# Patient Record
Sex: MALE | Race: WHITE | NOT HISPANIC OR LATINO | Employment: FULL TIME | URBAN - METROPOLITAN AREA
[De-identification: names, ages, dates, MRNs, and addresses within clinical notes are randomized per-mention and may not be internally consistent; named-entity substitution may affect disease eponyms.]

---

## 2018-11-09 ENCOUNTER — TRANSFERRED RECORDS (OUTPATIENT)
Dept: HEALTH INFORMATION MANAGEMENT | Facility: CLINIC | Age: 58
End: 2018-11-09

## 2018-11-21 ENCOUNTER — TRANSFERRED RECORDS (OUTPATIENT)
Dept: HEALTH INFORMATION MANAGEMENT | Facility: CLINIC | Age: 58
End: 2018-11-21

## 2018-12-05 ENCOUNTER — RECORDS - HEALTHEAST (OUTPATIENT)
Dept: ADMINISTRATIVE | Facility: OTHER | Age: 58
End: 2018-12-05

## 2019-01-18 ENCOUNTER — TRANSFERRED RECORDS (OUTPATIENT)
Dept: HEALTH INFORMATION MANAGEMENT | Facility: CLINIC | Age: 59
End: 2019-01-18

## 2019-01-29 ENCOUNTER — TRANSFERRED RECORDS (OUTPATIENT)
Dept: HEALTH INFORMATION MANAGEMENT | Facility: CLINIC | Age: 59
End: 2019-01-29

## 2019-02-08 ENCOUNTER — TRANSFERRED RECORDS (OUTPATIENT)
Dept: HEALTH INFORMATION MANAGEMENT | Facility: CLINIC | Age: 59
End: 2019-02-08

## 2019-02-12 ENCOUNTER — PRE VISIT (OUTPATIENT)
Dept: RADIATION ONCOLOGY | Facility: CLINIC | Age: 59
End: 2019-02-12

## 2019-02-12 NOTE — TELEPHONE ENCOUNTER
Date of appointment: TBD-Left message for patient to call back and schedule   Diagnosis/reason for appointment:Male breast cancer-left  Referring provider/facility:Dr. Jesus Ruelas-[facility]  Who called:Right fax referral    Recent Studies  Imaging:Mamogram will be done on 2/13/19,CT of chest and abdomen and nuc med bone scan  Pathology:11/21/18 Left simple mastectomy and SLNB  Labs:  Previous chemo/radiation (if known):?    Records requested from:Jana from [facility] is sending imaging disc and pathology [phone]    Records received from:[facility]    Additional information:

## 2019-02-13 ENCOUNTER — TELEPHONE (OUTPATIENT)
Dept: UROLOGY | Facility: CLINIC | Age: 59
End: 2019-02-13

## 2019-02-13 NOTE — TELEPHONE ENCOUNTER
ONCOLOGY INTAKE: Records Information      APPT INFORMATION: 03[place]25 [place]/ [place] [facility]  Referring provider:  Dr. Ruelas  Referring provider s clinic:   [facility]  Reason for visit/diagnosis:  Prostate Cancer     Were the records received with the referral (via [employer])? No    Has patient been seen for any external appt for this diagnosis (enter clinic/location)? Yes   Dx: Prostate Cancer: Caller pt: Ref by: Dr. Ruelas [facility]:  Bx [facility] [phone] Imaginging at [facility] & Records Records

## 2019-02-14 ENCOUNTER — TRANSFERRED RECORDS (OUTPATIENT)
Dept: HEALTH INFORMATION MANAGEMENT | Facility: CLINIC | Age: 59
End: 2019-02-14

## 2019-02-14 NOTE — TELEPHONE ENCOUNTER
Pathology slides are at [facility] Phone [phone]/Fax [phone]    [facility] is mailing disc of images along with the reports.

## 2019-02-15 PROCEDURE — 00000346 ZZHCL STATISTIC REVIEW OUTSIDE SLIDES TC 88321: Performed by: RADIOLOGY

## 2019-02-26 ENCOUNTER — DOCUMENTATION ONLY (OUTPATIENT)
Dept: CARE COORDINATION | Facility: CLINIC | Age: 59
End: 2019-02-26

## 2019-03-04 DIAGNOSIS — K70.9 ALCOHOLIC LIVER DAMAGE (H): Primary | ICD-10-CM

## 2019-04-11 ENCOUNTER — TELEPHONE (OUTPATIENT)
Dept: GASTROENTEROLOGY | Facility: CLINIC | Age: 59
End: 2019-04-11

## 2019-04-11 ENCOUNTER — OFFICE VISIT (OUTPATIENT)
Dept: RADIATION ONCOLOGY | Facility: CLINIC | Age: 59
End: 2019-04-11
Attending: RADIOLOGY
Payer: COMMERCIAL

## 2019-04-11 VITALS
OXYGEN SATURATION: 97 % | WEIGHT: 168.5 LBS | SYSTOLIC BLOOD PRESSURE: 142 MMHG | DIASTOLIC BLOOD PRESSURE: 85 MMHG | HEART RATE: 86 BPM

## 2019-04-11 DIAGNOSIS — C50.122 MALIGNANT NEOPLASM OF CENTRAL PORTION OF LEFT BREAST IN MALE, ESTROGEN RECEPTOR POSITIVE (H): Primary | ICD-10-CM

## 2019-04-11 DIAGNOSIS — Z17.0 MALIGNANT NEOPLASM OF CENTRAL PORTION OF LEFT BREAST IN MALE, ESTROGEN RECEPTOR POSITIVE (H): Primary | ICD-10-CM

## 2019-04-11 LAB — COPATH REPORT: NORMAL

## 2019-04-11 PROCEDURE — G0463 HOSPITAL OUTPT CLINIC VISIT: HCPCS | Performed by: RADIOLOGY

## 2019-04-11 RX ORDER — MULTIVITAMIN,THERAPEUTIC
1 TABLET ORAL DAILY
COMMUNITY

## 2019-04-11 RX ORDER — TAMOXIFEN CITRATE 20 MG/1
TABLET ORAL
Refills: 1 | COMMUNITY
Start: 2019-03-13

## 2019-04-11 ASSESSMENT — ENCOUNTER SYMPTOMS
INSOMNIA: 0
FEVER: 0
CHILLS: 0
HEADACHES: 0
DYSURIA: 0
DOUBLE VISION: 0
NERVOUS/ANXIOUS: 0
VOMITING: 0
CONSTIPATION: 0
FREQUENCY: 0
EYE PAIN: 0
DEPRESSION: 0
SORE THROAT: 0
HEMATURIA: 0
BLOOD IN STOOL: 0
WEIGHT LOSS: 0
BACK PAIN: 0
SHORTNESS OF BREATH: 0
DIAPHORESIS: 0
BRUISES/BLEEDS EASILY: 0
TINGLING: 0
NECK PAIN: 0
DIARRHEA: 0
NAUSEA: 0
SEIZURES: 0
BLURRED VISION: 0
FALLS: 0
DIZZINESS: 0

## 2019-04-11 NOTE — LETTER
2019       RE: Gilles Malloy  [address]  [facility] [address]     Dear Colleague,    Thank you for referring your patient, Gilles Malloy, to the RADIATION ONCOLOGY CLINIC. Please see a copy of my visit note below.    Department of Radiation Oncology                   [place] [address]  [address] [place]  [address]  Office:  [phone]  Fax:  [phone]   Radiation Oncology Clinic  [address] [place] [address]  Phone:  [phone]  Fax:  [phone]     RE: Gilles Malloy : 1960   MRN: [id number] ÁNGEL: 2019     OUTPATIENT VISIT NOTE       PROBLEM: pT2 N0 M0 ER/KS+, HER-2- invasive ductal carcinoma of the LEFT breast     was seen for initial consultation in the Dept of Therapeutic Radiology on 2019 at the request of Dr. Ruelas    HISTORY OF PRESENT ILLNESS:   Mr. Malloy is a 58 year old male with a pT2 N0 M0 ER/KS+ HER2- IDC of the left breast.  He has significant history of alcohol abuse.  About  or , he was hospitalized for hepatic encephalopathy.  He also required paracentesis about once every 6 weeks.  However, he had since cut back from 1 bottle of Vodka a day to 6 beers a day, and has not required paracentesis in the past few years.  He did not have a history of variceal bleeding.    He initially noticed a lump in the subareolar area of the left breast around May or .  He observed this area for a few months during which time it remained relatively stable.  He did however bring it to the attention of his PCP who performed a biopsy. The patient's description is consistent with an incisional biopsy. Biopsy on 2018 ([id number]) showed grade 2 invasive ductal carcinoma.  Tumor was ER positive (80%, 2+), KS positive (80%, 2-3+), and HER-2 negative (1+) by IHC.    He underwent left simple mastectomy by Dr. Olsen on 2018.  It does not appear that he had any pre-op imaging. Surgical  "pathology ([id number]) showed invasive ductal carcinoma measuring 3.2 cm in greatest dimension.  His tumor was Lee[place] grade 2 and margins were uninvolved (3 mm from the nearest inferior/anterior margin).  DCIS without EIC was present and involving approximately 5% of the tumor and 9/13 blocks.  DCIS was nuclear grade 2 and solid, cribriform, and micropapillary types with focal comedonecrosis.  DCIS margins were also uninvolved (3 mm from the nearest inferior/anterior margin.  There were a total of 4 lymph nodes removed (non-sentinel), none of which contained metastatic carcinoma.  His final stage was therefore pT2pN0.     Following surgery, it appears that he was not referred to medical oncology for some time.  Eventually he was referred by his PCP and saw Dr. Ruelas on 1/18/2019.  Oncotype DX was sent at this time and returned low risk at 16 correlating with a 4% risk of distant recurrence at 9 years and an absolute chemotherapy benefit of less than 1%.  The patient also underwent staging with CT chest abdomen pelvis and a bone scan at this time.  CT and bone scan showed no evidence of metastatic disease, but there was evidence of a retroareolar soft tissue density on the right. He was also referred for genetic testing, and was found not to harbor mutations in BRCA1, BRCA2, CDH1, PALB2, PTEN, STK11, TP53. Per Dr. Ruelas, there appears to be some residual breast tissue on the left but it is unclear how this was assessed. Given the soft tissue density on the right, he referred the patient for mammogram and breast US. Adjuvant endocrine therapy was discussed with the plan to begin tamoxifen following consultation in radiation oncology, for which he was referred to our department.    Mammogram and ultrasound were performed on 2/13/2019.  The right breast demonstrated moderate gynecomastia.  The left breast demonstrated an area of scar tissue at the \"lumpectomy site\".  There was no suspicious mass or " adenopathy.  Bilateral breast ultrasound showed features typical of gynecomastia behind the nipple on the right.  Scanning of the left showed scar tissue subjacent to the skin incision.  There is no suspicious mass or adenopathy.  Final assessment was BI-RADS 2.    On exam today, Mr. Malloy reports that he is overall doing well.  He reports that he recovered well from his surgery and has no residual pain at this time.  He has furthermore not noticed any swelling in the left arm.  He does not have any sensory changes.  He reports that he has been on tamoxifen for 1-2 months and has not noticed any side effects from this.  He specifically denies hot flashes.  He has not had any recent fevers, chills, nausea, vomiting, headaches.  The remainder of his review of systems is unremarkable.      PAST MEDICAL HISTORY:   Alcohol abuse disorder  Alcoholic liver disease  Pancreatitis    Past Surgical History:   Procedure Laterality Date     CHOLECYSTECTOMY       HERNIA REPAIR      x 2     MANDIBLE SURGERY      Dental jaw surgery 2008     MASTECTOMY Left 11/21/2018     RECONSTRUCT NOSE AND SEPTUM (FUNCTIONAL)         CHEMOTHERAPY HISTORY: None     PAST RADIATION THERAPY HISTORY: None     MEDICATIONS:   Current Outpatient Medications   Medication Sig Dispense Refill     multivitamin, therapeutic (THERA-VIT) TABS tablet Take 1 tablet by mouth daily       tamoxifen (NOLVADEX) 20 MG tablet TK 1 T PO DAILY  1       ALLERGIES:  allergic to penicillins and sulfa drugs.    SOCIAL HISTORY: Single. Lives alone in [place]. Current every day smoker with approximately 35 pack years. Currently smokes 0.75 pack per day.  History of heavy EtOH use, ~1L spirits per day. Now drinks ~4-6 beers per day. No other recreational drug use.    FAMILY HISTORY:   family history includes Aplastic anemia in his sister; Prostate Cancer in his father.    REVIEW OF SYMPTOMS:  A full 14-point review of systems was performed.     PHYSICAL  EXAMINATION:    /85   Pulse 86   Wt 76.4 kg (168 lb 8 oz)   SpO2 97%   General: Alert, oriented, NAD  Skin: No rashes or lesions appreciated  HEENT: NCAT, EOMI, PERRL  Neck: Supple, full ROM, no cervical LAD  Breasts: Right gynecomastia. There is firm subareolar tissue just inferior to the nipple on the right. No masses palpated on the right. Left chest demonstrates well-healed mastectomy scar. No palpable masses on left. Some residual fatty tissue medial to the scar. Axillae negative bilaterally.  Heart: WWP  Lungs: Breathing comfortably on RA  Abd: Nondistended, soft, nontender  /Rectal: Deferred  Ext: Atraumatic, grossly intact strength  Neuro: CNs grossly intact, normal gait    ECOG PS: 1     ASSESSMENT: 58 year old male with [id number] ER/ME+ HER-2- IDC of the left breast s/p left mastectomy.    RECOMMENDATIONS:   We had a detailed discussion with Mr. Malloy regarding the role of radiation in male breast cancer.  We discussed that male breast cancer is uncommon and therefore there is a lack of data regarding the optimal treatment strategies in this patient population.  Currently, we treat male breast cancer similar to the way we treat female breast cancers.  We discussed that given his early stage disease and surgery which consisted of a mastectomy, we do not have a clear indication for adjuvant radiotherapy.  We did discuss that there are, however, a number of additional questions we have regarding his treatment to this point.  There is some question about whether the lymph nodes that were removed at the time of surgery or sentinel lymph nodes versus random sampling.  However, this is unlikely to change our current treatment recommendation.  We also discussed that there is some question regarding whether tamoxifen is the ideal endocrine therapy for him given his history of liver disease.  We discussed that we will discuss his case tomorrow at breast tumor board to address this question with our  medical oncologists.  He also has an appointment coming up with Dr. Larson of hepatology on Monday.  Finally, given gynecomastia, it is unclear if he would require surveillance breast imaging.  We explained the above plan to the patient who agrees with this course of action.  We will attempt to obtain the remainder of his outside records and will call him if any additional information influences the above recommendations.    Thank you for allowing us to participate in this patient's care.  Please feel free to call with any questions or concerns.    The patient was seen and discussed with staff, Dr. Schafer.     Matias Goodrich MD  Resident, PGY-4  Department of Radiation Oncology  [facility]  [phone]         I saw and examined the patient with the resident.  I have reviewed and edited the resident's note and agree with the plan of care.    Case discussed at the tumor boards.  1) It was felt that gynecomastia in and of itself is not associated with increased risk of breast cancer. The best surveillance strategy is physical examination.  Mammogram was not felt to be necessary.  2) It is unclear if his liver function would interfere with metabolism of Tamoxifen.  However, given lack of data, it was felt that tamoxifen is appropriate endocrine therapy for this gentleman.      Matty Schafer MD          HPI  INITIAL PATIENT ASSESSMENT    Diagnosis: Male breast cancer, Lt mastectomy 11/21/19    Prior radiation therapy: None    Prior chemotherapy: None    Prior hormonal therapy:Yes: started Tamoxifen last months    Pain Eval:  Denies    Psychosocial  Living arrangements: self/ roommates in a house  Fall Risk: independent   referral needs: Not needed    Advanced Directive: No  Implantable Cardiac Device? No    Review of Systems   Constitutional: Negative for chills, diaphoresis, fever, malaise/fatigue and weight loss.   HENT: Negative for ear pain, hearing loss, nosebleeds and sore throat.     Eyes: Negative for blurred vision, double vision and pain.   Respiratory: Negative for shortness of breath.    Cardiovascular: Negative for chest pain and leg swelling.   Gastrointestinal: Negative for blood in stool, constipation, diarrhea, nausea and vomiting.   Genitourinary: Negative for dysuria, frequency, hematuria and urgency.   Musculoskeletal: Negative for back pain, falls, joint pain and neck pain.   Skin: Negative for rash.   Neurological: Negative for dizziness, tingling, seizures and headaches.   Endo/Heme/Allergies: Does not bruise/bleed easily.   Psychiatric/Behavioral: Negative for depression. The patient is not nervous/anxious and does not have insomnia.            Nurse face-to-face time: Level 4:  15 min face to face time      Again, thank you for allowing me to participate in the care of your patient.      Sincerely,    Matty Schafer MD

## 2019-04-11 NOTE — TELEPHONE ENCOUNTER
Left message for pt reminding them of upcoming appointment.  Instructed pt to bring updated medications list.  Virginia Rudd MA

## 2019-04-11 NOTE — PROGRESS NOTES
HPI  INITIAL PATIENT ASSESSMENT    Diagnosis: Male breast cancer, Lt mastectomy 11/21/19    Prior radiation therapy: None    Prior chemotherapy: None    Prior hormonal therapy:Yes: started Tamoxifen last months    Pain Eval:  Denies    Psychosocial  Living arrangements: self/ roommates in a house  Fall Risk: independent   referral needs: Not needed    Advanced Directive: No  Implantable Cardiac Device? No    Review of Systems   Constitutional: Negative for chills, diaphoresis, fever, malaise/fatigue and weight loss.   HENT: Negative for ear pain, hearing loss, nosebleeds and sore throat.    Eyes: Negative for blurred vision, double vision and pain.   Respiratory: Negative for shortness of breath.    Cardiovascular: Negative for chest pain and leg swelling.   Gastrointestinal: Negative for blood in stool, constipation, diarrhea, nausea and vomiting.   Genitourinary: Negative for dysuria, frequency, hematuria and urgency.   Musculoskeletal: Negative for back pain, falls, joint pain and neck pain.   Skin: Negative for rash.   Neurological: Negative for dizziness, tingling, seizures and headaches.   Endo/Heme/Allergies: Does not bruise/bleed easily.   Psychiatric/Behavioral: Negative for depression. The patient is not nervous/anxious and does not have insomnia.            Nurse face-to-face time: Level 4:  15 min face to face time

## 2019-04-11 NOTE — PROGRESS NOTES
Department of Radiation Oncology                   [place] [address]  [address] [place]  [address]  Office:  [phone]  Fax:  [phone]   Radiation Oncology Clinic  [address] [place] [address]  Phone:  [phone]  Fax:  [phone]     RE: Gilles Malloy : 1960   MRN: [id number] ÁNGEL: 2019     OUTPATIENT VISIT NOTE       PROBLEM: pT2 N0 M0 ER/HI+, HER-2- invasive ductal carcinoma of the LEFT breast     was seen for initial consultation in the Dept of Therapeutic Radiology on 2019 at the request of Dr. Ruelas    HISTORY OF PRESENT ILLNESS:   Mr. Malloy is a 58 year old male with a pT2 N0 M0 ER/HI+ HER2- IDC of the left breast.  He has significant history of alcohol abuse.  About  or , he was hospitalized for hepatic encephalopathy.  He also required paracentesis about once every 6 weeks.  However, he had since cut back from 1 bottle of Vodka a day to 6 beers a day, and has not required paracentesis in the past few years.  He did not have a history of variceal bleeding.    He initially noticed a lump in the subareolar area of the left breast around May or .  He observed this area for a few months during which time it remained relatively stable.  He did however bring it to the attention of his PCP who performed a biopsy. The patient's description is consistent with an incisional biopsy. Biopsy on 2018 ([id number]) showed grade 2 invasive ductal carcinoma.  Tumor was ER positive (80%, 2+), HI positive (80%, 2-3+), and HER-2 negative (1+) by IHC.    He underwent left simple mastectomy by Dr. Olsen on 2018.  It does not appear that he had any pre-op imaging. Surgical pathology ([id number]) showed invasive ductal carcinoma measuring 3.2 cm in greatest dimension.  His tumor was [place] grade 2 and margins were uninvolved (3 mm from the nearest inferior/anterior margin).  DCIS without EIC was present and  "involving approximately 5% of the tumor and 9/13 blocks.  DCIS was nuclear grade 2 and solid, cribriform, and micropapillary types with focal comedonecrosis.  DCIS margins were also uninvolved (3 mm from the nearest inferior/anterior margin.  There were a total of 4 lymph nodes removed (non-sentinel), none of which contained metastatic carcinoma.  His final stage was therefore pT2pN0.     Following surgery, it appears that he was not referred to medical oncology for some time.  Eventually he was referred by his PCP and saw Dr. Ruelas on 1/18/2019.  Oncotype DX was sent at this time and returned low risk at 16 correlating with a 4% risk of distant recurrence at 9 years and an absolute chemotherapy benefit of less than 1%.  The patient also underwent staging with CT chest abdomen pelvis and a bone scan at this time.  CT and bone scan showed no evidence of metastatic disease, but there was evidence of a retroareolar soft tissue density on the right. He was also referred for genetic testing, and was found not to harbor mutations in BRCA1, BRCA2, CDH1, PALB2, PTEN, STK11, TP53. Per Dr. Ruelas, there appears to be some residual breast tissue on the left but it is unclear how this was assessed. Given the soft tissue density on the right, he referred the patient for mammogram and breast US. Adjuvant endocrine therapy was discussed with the plan to begin tamoxifen following consultation in radiation oncology, for which he was referred to our department.    Mammogram and ultrasound were performed on 2/13/2019.  The right breast demonstrated moderate gynecomastia.  The left breast demonstrated an area of scar tissue at the \"lumpectomy site\".  There was no suspicious mass or adenopathy.  Bilateral breast ultrasound showed features typical of gynecomastia behind the nipple on the right.  Scanning of the left showed scar tissue subjacent to the skin incision.  There is no suspicious mass or adenopathy.  Final assessment was " BI-RADS 2.    On exam today, Mr. Malloy reports that he is overall doing well.  He reports that he recovered well from his surgery and has no residual pain at this time.  He has furthermore not noticed any swelling in the left arm.  He does not have any sensory changes.  He reports that he has been on tamoxifen for 1-2 months and has not noticed any side effects from this.  He specifically denies hot flashes.  He has not had any recent fevers, chills, nausea, vomiting, headaches.  The remainder of his review of systems is unremarkable.      PAST MEDICAL HISTORY:   Alcohol abuse disorder  Alcoholic liver disease  Pancreatitis    Past Surgical History:   Procedure Laterality Date     CHOLECYSTECTOMY       HERNIA REPAIR      x 2     MANDIBLE SURGERY      Dental jaw surgery 2008     MASTECTOMY Left 11/21/2018     RECONSTRUCT NOSE AND SEPTUM (FUNCTIONAL)         CHEMOTHERAPY HISTORY: None     PAST RADIATION THERAPY HISTORY: None     MEDICATIONS:   Current Outpatient Medications   Medication Sig Dispense Refill     multivitamin, therapeutic (THERA-VIT) TABS tablet Take 1 tablet by mouth daily       tamoxifen (NOLVADEX) 20 MG tablet TK 1 T PO DAILY  1       ALLERGIES:  allergic to penicillins and sulfa drugs.    SOCIAL HISTORY: Single. Lives alone in [place]. Current every day smoker with approximately 35 pack years. Currently smokes 0.75 pack per day.  History of heavy EtOH use, ~1L spirits per day. Now drinks ~4-6 beers per day. No other recreational drug use.    FAMILY HISTORY:   family history includes Aplastic anemia in his sister; Prostate Cancer in his father.    REVIEW OF SYMPTOMS:  A full 14-point review of systems was performed.     PHYSICAL EXAMINATION:    /85   Pulse 86   Wt 76.4 kg (168 lb 8 oz)   SpO2 97%   General: Alert, oriented, NAD  Skin: No rashes or lesions appreciated  HEENT: NCAT, EOMI, PERRL  Neck: Supple, full ROM, no cervical LAD  Breasts: Right gynecomastia. There  is firm subareolar tissue just inferior to the nipple on the right. No masses palpated on the right. Left chest demonstrates well-healed mastectomy scar. No palpable masses on left. Some residual fatty tissue medial to the scar. Axillae negative bilaterally.  Heart: WWP  Lungs: Breathing comfortably on RA  Abd: Nondistended, soft, nontender  /Rectal: Deferred  Ext: Atraumatic, grossly intact strength  Neuro: CNs grossly intact, normal gait    ECOG PS: 1     ASSESSMENT: 58 year old male with [id number] ER/NH+ HER-2- IDC of the left breast s/p left mastectomy.    RECOMMENDATIONS:   We had a detailed discussion with Mr. Malloy regarding the role of radiation in male breast cancer.  We discussed that male breast cancer is uncommon and therefore there is a lack of data regarding the optimal treatment strategies in this patient population.  Currently, we treat male breast cancer similar to the way we treat female breast cancers.  We discussed that given his early stage disease and surgery which consisted of a mastectomy, we do not have a clear indication for adjuvant radiotherapy.  We did discuss that there are, however, a number of additional questions we have regarding his treatment to this point.  There is some question about whether the lymph nodes that were removed at the time of surgery or sentinel lymph nodes versus random sampling.  However, this is unlikely to change our current treatment recommendation.  We also discussed that there is some question regarding whether tamoxifen is the ideal endocrine therapy for him given his history of liver disease.  We discussed that we will discuss his case tomorrow at breast tumor board to address this question with our medical oncologists.  He also has an appointment coming up with Dr. Larson of hepatology on Monday.  Finally, given gynecomastia, it is unclear if he would require surveillance breast imaging.  We explained the above plan to the patient who agrees with this  course of action.  We will attempt to obtain the remainder of his outside records and will call him if any additional information influences the above recommendations.    Thank you for allowing us to participate in this patient's care.  Please feel free to call with any questions or concerns.    The patient was seen and discussed with staff, Dr. Schafer.     Matias Goodrich MD  Resident, PGY-4  Department of Radiation Oncology  [facility]  [phone]         I saw and examined the patient with the resident.  I have reviewed and edited the resident's note and agree with the plan of care.    Case discussed at the tumor boards.  1) It was felt that gynecomastia in and of itself is not associated with increased risk of breast cancer. The best surveillance strategy is physical examination.  Mammogram was not felt to be necessary.  2) It is unclear if his liver function would interfere with metabolism of Tamoxifen.  However, given lack of data, it was felt that tamoxifen is appropriate endocrine therapy for this gentleman.      Matty Schafer MD

## 2019-04-12 ENCOUNTER — TELEPHONE (OUTPATIENT)
Dept: RADIATION ONCOLOGY | Facility: CLINIC | Age: 59
End: 2019-04-12

## 2019-04-15 ENCOUNTER — OFFICE VISIT (OUTPATIENT)
Dept: GASTROENTEROLOGY | Facility: CLINIC | Age: 59
End: 2019-04-15
Attending: INTERNAL MEDICINE
Payer: COMMERCIAL

## 2019-04-15 VITALS
TEMPERATURE: 97.9 F | DIASTOLIC BLOOD PRESSURE: 92 MMHG | HEIGHT: 69 IN | OXYGEN SATURATION: 95 % | BODY MASS INDEX: 24.62 KG/M2 | WEIGHT: 166.2 LBS | HEART RATE: 85 BPM | SYSTOLIC BLOOD PRESSURE: 156 MMHG

## 2019-04-15 DIAGNOSIS — K70.30 ALCOHOLIC CIRRHOSIS OF LIVER WITHOUT ASCITES (H): Primary | ICD-10-CM

## 2019-04-15 DIAGNOSIS — K70.9 ALCOHOLIC LIVER DAMAGE (H): ICD-10-CM

## 2019-04-15 LAB
ALBUMIN SERPL-MCNC: 3.3 G/DL (ref 3.4–5)
ALP SERPL-CCNC: 78 U/L (ref 40–150)
ALT SERPL W P-5'-P-CCNC: 41 U/L (ref 0–70)
ANION GAP SERPL CALCULATED.3IONS-SCNC: 9 MMOL/L (ref 3–14)
AST SERPL W P-5'-P-CCNC: 55 U/L (ref 0–45)
BILIRUB DIRECT SERPL-MCNC: 0.6 MG/DL (ref 0–0.2)
BILIRUB SERPL-MCNC: 0.9 MG/DL (ref 0.2–1.3)
BUN SERPL-MCNC: 8 MG/DL (ref 7–30)
CALCIUM SERPL-MCNC: 9.6 MG/DL (ref 8.5–10.1)
CHLORIDE SERPL-SCNC: 109 MMOL/L (ref 94–109)
CO2 SERPL-SCNC: 22 MMOL/L (ref 20–32)
CREAT SERPL-MCNC: 0.81 MG/DL (ref 0.66–1.25)
ERYTHROCYTE [DISTWIDTH] IN BLOOD BY AUTOMATED COUNT: 12.8 % (ref 10–15)
GFR SERPL CREATININE-BSD FRML MDRD: >90 ML/MIN/{1.73_M2}
GLUCOSE SERPL-MCNC: 121 MG/DL (ref 70–99)
HCT VFR BLD AUTO: 45.1 % (ref 40–53)
HGB BLD-MCNC: 15.5 G/DL (ref 13.3–17.7)
INR PPP: 1.18 (ref 0.86–1.14)
MCH RBC QN AUTO: 35 PG (ref 26.5–33)
MCHC RBC AUTO-ENTMCNC: 34.4 G/DL (ref 31.5–36.5)
MCV RBC AUTO: 102 FL (ref 78–100)
PLATELET # BLD AUTO: 116 10E9/L (ref 150–450)
POTASSIUM SERPL-SCNC: 3.5 MMOL/L (ref 3.4–5.3)
PROT SERPL-MCNC: 8.2 G/DL (ref 6.8–8.8)
RBC # BLD AUTO: 4.43 10E12/L (ref 4.4–5.9)
SODIUM SERPL-SCNC: 139 MMOL/L (ref 133–144)
WBC # BLD AUTO: 6.2 10E9/L (ref 4–11)

## 2019-04-15 PROCEDURE — G0463 HOSPITAL OUTPT CLINIC VISIT: HCPCS | Mod: 25,ZF

## 2019-04-15 PROCEDURE — 80076 HEPATIC FUNCTION PANEL: CPT | Performed by: INTERNAL MEDICINE

## 2019-04-15 PROCEDURE — 85027 COMPLETE CBC AUTOMATED: CPT | Performed by: INTERNAL MEDICINE

## 2019-04-15 PROCEDURE — 36415 COLL VENOUS BLD VENIPUNCTURE: CPT | Performed by: INTERNAL MEDICINE

## 2019-04-15 PROCEDURE — 80048 BASIC METABOLIC PNL TOTAL CA: CPT | Performed by: INTERNAL MEDICINE

## 2019-04-15 PROCEDURE — 90732 PPSV23 VACC 2 YRS+ SUBQ/IM: CPT | Mod: ZF | Performed by: INTERNAL MEDICINE

## 2019-04-15 PROCEDURE — G0009 ADMIN PNEUMOCOCCAL VACCINE: HCPCS | Mod: ZF

## 2019-04-15 PROCEDURE — 25000128 H RX IP 250 OP 636: Mod: ZF | Performed by: INTERNAL MEDICINE

## 2019-04-15 PROCEDURE — 85610 PROTHROMBIN TIME: CPT | Performed by: INTERNAL MEDICINE

## 2019-04-15 RX ADMIN — PNEUMOCOCCAL VACCINE POLYVALENT 0.5 ML
25; 25; 25; 25; 25; 25; 25; 25; 25; 25; 25; 25; 25; 25; 25; 25; 25; 25; 25; 25; 25; 25; 25 INJECTION, SOLUTION INTRAMUSCULAR; SUBCUTANEOUS at 16:42

## 2019-04-15 ASSESSMENT — PAIN SCALES - GENERAL: PAINLEVEL: NO PAIN (0)

## 2019-04-15 ASSESSMENT — MIFFLIN-ST. JEOR: SCORE: 1554.26

## 2019-04-15 NOTE — NURSING NOTE
"Chief Complaint   Patient presents with     Consult     Alcoholic liver damage      BP (!) 156/92   Pulse 85   Temp 97.9  F (36.6  C) (Oral)   Ht 1.753 m (5' 9\")   Wt 75.4 kg (166 lb 3.2 oz)   SpO2 95%   BMI 24.54 kg/m    Virginia Rudd MA    "

## 2019-04-15 NOTE — LETTER
4/15/2019       RE: Gilles Malloy  [address]  [facility] [address]     Dear Colleague,    Thank you for referring your patient, Gilles Malloy, to the [facility] HEPATOLOGY at [facility]. Please see a copy of my visit note below.    I had the pleasure of seeing Gilles Malloy for consultation in the Liver Clinic at the [facility] on 04/15/2019.  Mr. Malloy presents for consultation regarding probable diagnosis of alcoholic cirrhosis.      A significant recent history dates back several months ago when his primary physician felt a hard lump in his breast.  It was subsequently biopsied and shown to be breast cancer.  He has subsequently had a mastectomy and is currently taking tamoxifen.  It is interesting that he was on spironolactone for management of ascites previously but did not take it for quite some time.      In terms of his liver disease, several years ago he did develop the onset of ascites.  He was drinking about a quart of hard liquor per day and cut back to drinking only beer of 4-6 per day and with that it appears as though his liver function did improve somewhat and eventually he was able to stop the large volume paracentesis.  He never did have a liver biopsy and he believes he has had an upper endoscopy but did not know the results of that.      At present, he feels really quite well.  He denies any abdominal pain, itching or skin rash or fatigue.  He denies any increased abdominal girth or lower extremity edema.  He denies any gastrointestinal bleeding or any overt signs of hepatic encephalopathy.  He denies any fevers or chills, cough or shortness of breath.  He denies any nausea, vomiting, diarrhea or constipation.  His appetite has been good, and his weight has been stable.      Unfortunately, he continues to drink probably at least 4 beers every night.      PAST MEDICAL HISTORY:  Significant for previous  "cholecystectomy.  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  He has had 2 umbilical hernias repaired as well as a previous appendectomy.  He has no other medical problems.      SOCIAL HISTORY:  He works as a .  He is a smoker.  His alcohol history is as noted above.      FAMILY HISTORY:  He has an uncle who has liver disease from alcohol.      REVIEW OF SYSTEMS:  A complete review of systems was negative other than that noted above.     Current Outpatient Medications   Medication     multivitamin, therapeutic (THERA-VIT) TABS tablet     tamoxifen (NOLVADEX) 20 MG tablet     No current facility-administered medications for this visit.      BP (!) 156/92   Pulse 85   Temp 97.9  F (36.6  C) (Oral)   Ht 1.753 m (5' 9\")   Wt 75.4 kg (166 lb 3.2 oz)   SpO2 95%   BMI 24.54 kg/m       In general he looks reasonably well.  HEENT exam shows no scleral icterus or temporal muscle wasting.  He has very poor dentition.  His neck is without thyromegaly.  His chest is clear.  His cardiac exam reveals no S3, S4 or murmurs.  His abdominal exam shows no obvious ascites.  No masses or tenderness to palpation are present.  His liver is 10 cm in span without left lobe enlargement.  No spleen tip is palpable.  Extremity exam shows no edema.  Skin exam shows some palmar erythema with a few spider angioma.  Neurologic exam shows no asterixis.     Recent Results (from the past 168 hour(s))   CBC with platelets    Collection Time: 04/15/19  3:11 PM   Result Value Ref Range    WBC 6.2 4.0 - 11.0 10e9/L    RBC Count 4.43 4.4 - 5.9 10e12/L    Hemoglobin 15.5 13.3 - 17.7 g/dL    Hematocrit 45.1 40.0 - 53.0 %     (H) 78 - 100 fl    MCH 35.0 (H) 26.5 - 33.0 pg    MCHC 34.4 31.5 - 36.5 g/dL    RDW 12.8 10.0 - 15.0 %    Platelet Count 116 (L) 150 - 450 10e9/L   Basic metabolic panel    Collection Time: 04/15/19  3:11 PM   Result Value Ref Range    Sodium 139 133 - 144 mmol/L    " Potassium 3.5 3.4 - 5.3 mmol/L    Chloride 109 94 - 109 mmol/L    Carbon Dioxide 22 20 - 32 mmol/L    Anion Gap 9 3 - 14 mmol/L    Glucose 121 (H) 70 - 99 mg/dL    Urea Nitrogen 8 7 - 30 mg/dL    Creatinine 0.81 0.66 - 1.25 mg/dL    GFR Estimate >90 >60 mL/min/[1.73_m2]    GFR Estimate If Black >90 >60 mL/min/[1.73_m2]    Calcium 9.6 8.5 - 10.1 mg/dL   Hepatic panel    Collection Time: 04/15/19  3:11 PM   Result Value Ref Range    Bilirubin Direct 0.6 (H) 0.0 - 0.2 mg/dL    Bilirubin Total 0.9 0.2 - 1.3 mg/dL    Albumin 3.3 (L) 3.4 - 5.0 g/dL    Protein Total 8.2 6.8 - 8.8 g/dL    Alkaline Phosphatase 78 40 - 150 U/L    ALT 41 0 - 70 U/L    AST 55 (H) 0 - 45 U/L   INR    Collection Time: 04/15/19  3:11 PM   Result Value Ref Range    INR 1.18 (H) 0.86 - 1.14      I did review his recent CT scan that shows some perigastric and perisplenic varices.  No ascites was seen.  His liver did appear to be fatty.      IMPRESSION:  My impression is that Mr. Malloy has alcoholic cirrhosis without ascites.  He does need to stop all alcohol completely if he does have fatty liver disease and elevated ALT and elevated MCV.  I did advise him that he may want to look into alcohol treatment as well.  He does need to be tested for hepatitis C as he does not think he has been tested for many years for that.      In terms of fully evaluating his cirrhosis, he does need an upper GI endoscopy to screen for esophageal varices.  He is up-to-date with regard to variceal screening.  I will give him a Pneumovax today and he will get a Prevnar 13 again in 1 year.  Otherwise, my plan will be to see him back in the clinic again in 6 months.      Thank you very much for allowing me to participate in the care of your patient.  If you have any questions regarding my recommendations, please do not hesitate to contact me.       Zach Larson MD      Professor of Medicine  [employer]      Executive Medical Director, Solid  Organ Transplant Program  [facility]

## 2019-04-15 NOTE — LETTER
4/15/2019      RE: Gilles Malloy  [address]  [facility] [address]       I had the pleasure of seeing Gilles Malloy for consultation in the Liver Clinic at the [facility] on 04/15/2019.  Mr. Malloy presents for consultation regarding probable diagnosis of alcoholic cirrhosis.      A significant recent history dates back several months ago when his primary physician felt a hard lump in his breast.  It was subsequently biopsied and shown to be breast cancer.  He has subsequently had a mastectomy and is currently taking tamoxifen.  It is interesting that he was on spironolactone for management of ascites previously but did not take it for quite some time.      In terms of his liver disease, several years ago he did develop the onset of ascites.  He was drinking about a quart of hard liquor per day and cut back to drinking only beer of 4-6 per day and with that it appears as though his liver function did improve somewhat and eventually he was able to stop the large volume paracentesis.  He never did have a liver biopsy and he believes he has had an upper endoscopy but did not know the results of that.      At present, he feels really quite well.  He denies any abdominal pain, itching or skin rash or fatigue.  He denies any increased abdominal girth or lower extremity edema.  He denies any gastrointestinal bleeding or any overt signs of hepatic encephalopathy.  He denies any fevers or chills, cough or shortness of breath.  He denies any nausea, vomiting, diarrhea or constipation.  His appetite has been good, and his weight has been stable.      Unfortunately, he continues to drink probably at least 4 beers every night.      PAST MEDICAL HISTORY:  Significant for previous cholecystectomy.  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  He has had 2 umbilical hernias repaired as well as a previous appendectomy.  He has no other medical problems.     "  SOCIAL HISTORY:  He works as a .  He is a smoker.  His alcohol history is as noted above.      FAMILY HISTORY:  He has an uncle who has liver disease from alcohol.      REVIEW OF SYSTEMS:  A complete review of systems was negative other than that noted above.     Current Outpatient Medications   Medication     multivitamin, therapeutic (THERA-VIT) TABS tablet     tamoxifen (NOLVADEX) 20 MG tablet     No current facility-administered medications for this visit.      BP (!) 156/92   Pulse 85   Temp 97.9  F (36.6  C) (Oral)   Ht 1.753 m (5' 9\")   Wt 75.4 kg (166 lb 3.2 oz)   SpO2 95%   BMI 24.54 kg/m       In general he looks reasonably well.  HEENT exam shows no scleral icterus or temporal muscle wasting.  He has very poor dentition.  His neck is without thyromegaly.  His chest is clear.  His cardiac exam reveals no S3, S4 or murmurs.  His abdominal exam shows no obvious ascites.  No masses or tenderness to palpation are present.  His liver is 10 cm in span without left lobe enlargement.  No spleen tip is palpable.  Extremity exam shows no edema.  Skin exam shows some palmar erythema with a few spider angioma.  Neurologic exam shows no asterixis.     Recent Results (from the past 168 hour(s))   CBC with platelets    Collection Time: 04/15/19  3:11 PM   Result Value Ref Range    WBC 6.2 4.0 - 11.0 10e9/L    RBC Count 4.43 4.4 - 5.9 10e12/L    Hemoglobin 15.5 13.3 - 17.7 g/dL    Hematocrit 45.1 40.0 - 53.0 %     (H) 78 - 100 fl    MCH 35.0 (H) 26.5 - 33.0 pg    MCHC 34.4 31.5 - 36.5 g/dL    RDW 12.8 10.0 - 15.0 %    Platelet Count 116 (L) 150 - 450 10e9/L   Basic metabolic panel    Collection Time: 04/15/19  3:11 PM   Result Value Ref Range    Sodium 139 133 - 144 mmol/L    Potassium 3.5 3.4 - 5.3 mmol/L    Chloride 109 94 - 109 mmol/L    Carbon Dioxide 22 20 - 32 mmol/L    Anion Gap 9 3 - 14 mmol/L    Glucose 121 (H) 70 - 99 mg/dL    Urea Nitrogen 8 7 - 30 mg/dL    " Creatinine 0.81 0.66 - 1.25 mg/dL    GFR Estimate >90 >60 mL/min/[1.73_m2]    GFR Estimate If Black >90 >60 mL/min/[1.73_m2]    Calcium 9.6 8.5 - 10.1 mg/dL   Hepatic panel    Collection Time: 04/15/19  3:11 PM   Result Value Ref Range    Bilirubin Direct 0.6 (H) 0.0 - 0.2 mg/dL    Bilirubin Total 0.9 0.2 - 1.3 mg/dL    Albumin 3.3 (L) 3.4 - 5.0 g/dL    Protein Total 8.2 6.8 - 8.8 g/dL    Alkaline Phosphatase 78 40 - 150 U/L    ALT 41 0 - 70 U/L    AST 55 (H) 0 - 45 U/L   INR    Collection Time: 04/15/19  3:11 PM   Result Value Ref Range    INR 1.18 (H) 0.86 - 1.14      I did review his recent CT scan that shows some perigastric and perisplenic varices.  No ascites was seen.  His liver did appear to be fatty.      IMPRESSION:  My impression is that Mr. Malloy has alcoholic cirrhosis without ascites.  He does need to stop all alcohol completely if he does have fatty liver disease and elevated ALT and elevated MCV.  I did advise him that he may want to look into alcohol treatment as well.  He does need to be tested for hepatitis C as he does not think he has been tested for many years for that.      In terms of fully evaluating his cirrhosis, he does need an upper GI endoscopy to screen for esophageal varices.  He is up-to-date with regard to variceal screening.  I will give him a Pneumovax today and he will get a Prevnar 13 again in 1 year.  Otherwise, my plan will be to see him back in the clinic again in 6 months.      Thank you very much for allowing me to participate in the care of your patient.  If you have any questions regarding my recommendations, please do not hesitate to contact me.       Zach Larson MD      Professor of Medicine  [employer]      Executive Medical Director, Solid Organ Transplant Program  [facility]    Zach Larson MD

## 2019-04-15 NOTE — PROGRESS NOTES
I had the pleasure of seeing Gilles Malloy for consultation in the Liver Clinic at the [facility] on 04/15/2019.  Mr. Malloy presents for consultation regarding probable diagnosis of alcoholic cirrhosis.      A significant recent history dates back several months ago when his primary physician felt a hard lump in his breast.  It was subsequently biopsied and shown to be breast cancer.  He has subsequently had a mastectomy and is currently taking tamoxifen.  It is interesting that he was on spironolactone for management of ascites previously but did not take it for quite some time.      In terms of his liver disease, several years ago he did develop the onset of ascites.  He was drinking about a quart of hard liquor per day and cut back to drinking only beer of 4-6 per day and with that it appears as though his liver function did improve somewhat and eventually he was able to stop the large volume paracentesis.  He never did have a liver biopsy and he believes he has had an upper endoscopy but did not know the results of that.      At present, he feels really quite well.  He denies any abdominal pain, itching or skin rash or fatigue.  He denies any increased abdominal girth or lower extremity edema.  He denies any gastrointestinal bleeding or any overt signs of hepatic encephalopathy.  He denies any fevers or chills, cough or shortness of breath.  He denies any nausea, vomiting, diarrhea or constipation.  His appetite has been good, and his weight has been stable.      Unfortunately, he continues to drink probably at least 4 beers every night.      PAST MEDICAL HISTORY:  Significant for previous cholecystectomy.  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  He has had 2 umbilical hernias repaired as well as a previous appendectomy.  He has no other medical problems.      SOCIAL HISTORY:  He works as a .  He is a smoker.   "His alcohol history is as noted above.      FAMILY HISTORY:  He has an uncle who has liver disease from alcohol.      REVIEW OF SYSTEMS:  A complete review of systems was negative other than that noted above.     Current Outpatient Medications   Medication     multivitamin, therapeutic (THERA-VIT) TABS tablet     tamoxifen (NOLVADEX) 20 MG tablet     No current facility-administered medications for this visit.      BP (!) 156/92   Pulse 85   Temp 97.9  F (36.6  C) (Oral)   Ht 1.753 m (5' 9\")   Wt 75.4 kg (166 lb 3.2 oz)   SpO2 95%   BMI 24.54 kg/m      In general he looks reasonably well.  HEENT exam shows no scleral icterus or temporal muscle wasting.  He has very poor dentition.  His neck is without thyromegaly.  His chest is clear.  His cardiac exam reveals no S3, S4 or murmurs.  His abdominal exam shows no obvious ascites.  No masses or tenderness to palpation are present.  His liver is 10 cm in span without left lobe enlargement.  No spleen tip is palpable.  Extremity exam shows no edema.  Skin exam shows some palmar erythema with a few spider angioma.  Neurologic exam shows no asterixis.     Recent Results (from the past 168 hour(s))   CBC with platelets    Collection Time: 04/15/19  3:11 PM   Result Value Ref Range    WBC 6.2 4.0 - 11.0 10e9/L    RBC Count 4.43 4.4 - 5.9 10e12/L    Hemoglobin 15.5 13.3 - 17.7 g/dL    Hematocrit 45.1 40.0 - 53.0 %     (H) 78 - 100 fl    MCH 35.0 (H) 26.5 - 33.0 pg    MCHC 34.4 31.5 - 36.5 g/dL    RDW 12.8 10.0 - 15.0 %    Platelet Count 116 (L) 150 - 450 10e9/L   Basic metabolic panel    Collection Time: 04/15/19  3:11 PM   Result Value Ref Range    Sodium 139 133 - 144 mmol/L    Potassium 3.5 3.4 - 5.3 mmol/L    Chloride 109 94 - 109 mmol/L    Carbon Dioxide 22 20 - 32 mmol/L    Anion Gap 9 3 - 14 mmol/L    Glucose 121 (H) 70 - 99 mg/dL    Urea Nitrogen 8 7 - 30 mg/dL    Creatinine 0.81 0.66 - 1.25 mg/dL    GFR Estimate >90 >60 mL/min/[1.73_m2]    GFR Estimate If " Black >90 >60 mL/min/[1.73_m2]    Calcium 9.6 8.5 - 10.1 mg/dL   Hepatic panel    Collection Time: 04/15/19  3:11 PM   Result Value Ref Range    Bilirubin Direct 0.6 (H) 0.0 - 0.2 mg/dL    Bilirubin Total 0.9 0.2 - 1.3 mg/dL    Albumin 3.3 (L) 3.4 - 5.0 g/dL    Protein Total 8.2 6.8 - 8.8 g/dL    Alkaline Phosphatase 78 40 - 150 U/L    ALT 41 0 - 70 U/L    AST 55 (H) 0 - 45 U/L   INR    Collection Time: 04/15/19  3:11 PM   Result Value Ref Range    INR 1.18 (H) 0.86 - 1.14      I did review his recent CT scan that shows some perigastric and perisplenic varices.  No ascites was seen.  His liver did appear to be fatty.      IMPRESSION:  My impression is that Mr. Malloy has alcoholic cirrhosis without ascites.  He does need to stop all alcohol completely if he does have fatty liver disease and elevated ALT and elevated MCV.  I did advise him that he may want to look into alcohol treatment as well.  He does need to be tested for hepatitis C as he does not think he has been tested for many years for that.      In terms of fully evaluating his cirrhosis, he does need an upper GI endoscopy to screen for esophageal varices.  He is up-to-date with regard to variceal screening.  I will give him a Pneumovax today and he will get a Prevnar 13 again in 1 year.  Otherwise, my plan will be to see him back in the clinic again in 6 months.      Thank you very much for allowing me to participate in the care of your patient.  If you have any questions regarding my recommendations, please do not hesitate to contact me.       Zach Larson MD      Professor of Medicine  [employer]      Executive Medical Director, Solid Organ Transplant Program  [facility]

## 2019-04-16 ENCOUNTER — HOSPITAL ENCOUNTER (OUTPATIENT)
Facility: AMBULATORY SURGERY CENTER | Age: 59
End: 2019-04-16
Attending: INTERNAL MEDICINE
Payer: COMMERCIAL

## 2019-04-26 ENCOUNTER — TELEPHONE (OUTPATIENT)
Dept: GASTROENTEROLOGY | Facility: CLINIC | Age: 59
End: 2019-04-26

## 2019-04-26 NOTE — TELEPHONE ENCOUNTER
Patient requested to cancel procedure as he doesn't have a designated  to escort him home. Writer cancelled procedure scheduled for may 1st with [facility] and gave him the scheduling line to call back and reschedule.

## 2019-06-28 ENCOUNTER — TRANSFERRED RECORDS (OUTPATIENT)
Dept: HEALTH INFORMATION MANAGEMENT | Facility: CLINIC | Age: 59
End: 2019-06-28

## 2019-09-30 ENCOUNTER — DOCUMENTATION ONLY (OUTPATIENT)
Dept: CARE COORDINATION | Facility: CLINIC | Age: 59
End: 2019-09-30

## 2019-10-12 ENCOUNTER — ANCILLARY PROCEDURE (OUTPATIENT)
Dept: ULTRASOUND IMAGING | Facility: CLINIC | Age: 59
End: 2019-10-12
Attending: INTERNAL MEDICINE
Payer: COMMERCIAL

## 2019-10-12 DIAGNOSIS — K70.30 ALCOHOLIC CIRRHOSIS OF LIVER WITHOUT ASCITES (H): ICD-10-CM

## 2019-10-12 LAB
AFP SERPL-MCNC: 5.5 UG/L (ref 0–8)
ALBUMIN SERPL-MCNC: 3.2 G/DL (ref 3.4–5)
ALP SERPL-CCNC: 78 U/L (ref 40–150)
ALT SERPL W P-5'-P-CCNC: 38 U/L (ref 0–70)
ANION GAP SERPL CALCULATED.3IONS-SCNC: 4 MMOL/L (ref 3–14)
AST SERPL W P-5'-P-CCNC: 60 U/L (ref 0–45)
BILIRUB DIRECT SERPL-MCNC: 0.8 MG/DL (ref 0–0.2)
BILIRUB SERPL-MCNC: 1.3 MG/DL (ref 0.2–1.3)
BUN SERPL-MCNC: 8 MG/DL (ref 7–30)
CALCIUM SERPL-MCNC: 8.7 MG/DL (ref 8.5–10.1)
CHLORIDE SERPL-SCNC: 111 MMOL/L (ref 94–109)
CO2 SERPL-SCNC: 26 MMOL/L (ref 20–32)
CREAT SERPL-MCNC: 0.99 MG/DL (ref 0.66–1.25)
ERYTHROCYTE [DISTWIDTH] IN BLOOD BY AUTOMATED COUNT: 12.8 % (ref 10–15)
GFR SERPL CREATININE-BSD FRML MDRD: 82 ML/MIN/{1.73_M2}
GLUCOSE SERPL-MCNC: 88 MG/DL (ref 70–99)
HCT VFR BLD AUTO: 45 % (ref 40–53)
HGB BLD-MCNC: 15 G/DL (ref 13.3–17.7)
INR PPP: 1.18 (ref 0.86–1.14)
MCH RBC QN AUTO: 34.4 PG (ref 26.5–33)
MCHC RBC AUTO-ENTMCNC: 33.3 G/DL (ref 31.5–36.5)
MCV RBC AUTO: 103 FL (ref 78–100)
PLATELET # BLD AUTO: 84 10E9/L (ref 150–450)
POTASSIUM SERPL-SCNC: 4.4 MMOL/L (ref 3.4–5.3)
PROT SERPL-MCNC: 8 G/DL (ref 6.8–8.8)
RBC # BLD AUTO: 4.36 10E12/L (ref 4.4–5.9)
SODIUM SERPL-SCNC: 141 MMOL/L (ref 133–144)
WBC # BLD AUTO: 4.2 10E9/L (ref 4–11)

## 2019-10-14 LAB — HCV AB SERPL QL IA: NONREACTIVE

## 2019-10-22 ENCOUNTER — TELEPHONE (OUTPATIENT)
Dept: GASTROENTEROLOGY | Facility: CLINIC | Age: 59
End: 2019-10-22

## 2019-10-22 NOTE — TELEPHONE ENCOUNTER
Left message for pt reminding them of upcoming appointment.  Instructed pt to bring updated medications list.  Virginia Rudd, CMA

## 2019-10-25 ENCOUNTER — OFFICE VISIT (OUTPATIENT)
Dept: GASTROENTEROLOGY | Facility: CLINIC | Age: 59
End: 2019-10-25
Attending: INTERNAL MEDICINE
Payer: COMMERCIAL

## 2019-10-25 VITALS
TEMPERATURE: 98.3 F | WEIGHT: 162.4 LBS | DIASTOLIC BLOOD PRESSURE: 85 MMHG | BODY MASS INDEX: 23.98 KG/M2 | HEART RATE: 75 BPM | SYSTOLIC BLOOD PRESSURE: 137 MMHG | OXYGEN SATURATION: 97 %

## 2019-10-25 DIAGNOSIS — K70.30 ALCOHOLIC CIRRHOSIS OF LIVER WITHOUT ASCITES (H): Primary | ICD-10-CM

## 2019-10-25 PROCEDURE — 90682 RIV4 VACC RECOMBINANT DNA IM: CPT | Mod: ZF | Performed by: INTERNAL MEDICINE

## 2019-10-25 PROCEDURE — G0008 ADMIN INFLUENZA VIRUS VAC: HCPCS | Mod: ZF

## 2019-10-25 PROCEDURE — 25000128 H RX IP 250 OP 636: Mod: ZF | Performed by: INTERNAL MEDICINE

## 2019-10-25 PROCEDURE — G0463 HOSPITAL OUTPT CLINIC VISIT: HCPCS | Mod: 25,ZF

## 2019-10-25 RX ORDER — HYDROCODONE BITARTRATE AND ACETAMINOPHEN 10; 325 MG/1; MG/1
TABLET ORAL EVERY 6 HOURS PRN
COMMUNITY
Start: 2019-09-17

## 2019-10-25 RX ORDER — ALPRAZOLAM 0.5 MG
TABLET ORAL 3 TIMES DAILY PRN
COMMUNITY
Start: 2019-09-17

## 2019-10-25 RX ADMIN — INFLUENZA A VIRUS A/BRISBANE/02/2018 (H1N1) RECOMBINANT HEMAGGLUTININ ANTIGEN, INFLUENZA A VIRUS A/KANSAS/14/2017 (H3N2) RECOMBINANT HEMAGGLUTININ ANTIGEN, INFLUENZA B VIRUS B/PHUKET/3073/2013 RECOMBINANT HEMAGGLUTININ ANTIGEN, AND INFLUENZA B VIRUS B/MARYLAND/15/2016 RECOMBINANT HEMAGGLUTININ ANTIGEN 0.5 ML: 45; 45; 45; 45 INJECTION INTRAMUSCULAR at 11:02

## 2019-10-25 ASSESSMENT — PAIN SCALES - GENERAL: PAINLEVEL: NO PAIN (0)

## 2019-10-25 NOTE — LETTER
10/25/2019      RE: Gilles Malloy  [address]  [facility] [address]       HISTORY OF PRESENT ILLNESS:  I had the pleasure of seeing Brannon Malloy for followup in the Liver Clinic at the [facility] on 10/25/2019.  Mr. Malloy presents for followup of alcoholic cirrhosis.      Unfortunately, he has continued to drink and really does not show any desire to stop drinking altogether.  He also continues to use tobacco.      He, nonetheless, is feeling fairly well.  He denies any abdominal pain, itching or skin rash or fatigue.  He denies any increased abdominal girth or lower extremity edema.  He denies any fevers or chills, cough or shortness of breath.  He denies any nausea or vomiting, diarrhea or constipation.  His appetite has been good, and his weight has largely remained the same.  He has not had any gastrointestinal bleeding or any overt signs of hepatic encephalopathy and there have been no other new events since he was last seen.     Current Outpatient Medications   Medication     ALPRAZolam (XANAX) 0.5 MG tablet     HYDROcodone-acetaminophen (NORCO)  MG per tablet     multivitamin, therapeutic (THERA-VIT) TABS tablet     tamoxifen (NOLVADEX) 20 MG tablet     No current facility-administered medications for this visit.      /85   Pulse 75   Temp 98.3  F (36.8  C)   Wt 73.7 kg (162 lb 6.4 oz)   SpO2 97%   BMI 23.98 kg/m       PHYSICAL EXAMINATION:  In general, he actually looks relatively healthy.  HEENT exam shows no scleral icterus or temporal muscle wasting.  Chest is clear.  Abdominal exam shows no increase in girth.  No masses or tenderness to palpation are present.  His liver is 10 cm in span without left lobe enlargement.  No spleen tip is palpable, and extremity exam shows no edema.  Skin exam shows no stigmata of chronic liver disease.  Neurologic exam shows no asterixis.     LABORATORY:  His most recent laboratory tests show his white count  is 4.2, hemoglobin 15, platelets are 84,000.  INR is 1.18.  Sodium 141, potassium 4.4, BUN is 8, creatinine 0.99.  His AST 60, ALT is 38, alkaline phosphatase is 78.  His albumin is 3.2 with a total protein of 8.0.  His total bilirubin is 1.3 with a direct reacting bilirubin of 0.8.  His alpha-fetoprotein is 5.5.  He did have an abdominal ultrasound which showed a cirrhotic-appearing liver, no focal lesions.  There is no ascites.      IMPRESSION:  My impression is Mr. Malloy has alcoholic cirrhosis which is well compensated at this point in time.  I did strongly advise him to stop all alcohol and tobacco, although he certainly is not ready for that.      He is up to date with regard to variceal screening in that he had variceal screening in [place], [place].  His varices were graded to be 2 out of 5, which I am not familiar with that grading system, although it sounds as though they were small.      He will get a flu vaccine today and will otherwise be up to date on his vaccinations.  I will not be making any other change to his medical regimen.  I will see him back in the clinic again in 6 months for repeat ultrasound and blood work.      Thank you very much for allowing me to participate in the care of this patient.  If you have any questions regarding my recommendations, please do not hesitate to contact me.       Zach Larson MD      Professor of Medicine  [employer]      Executive Medical Director, Solid Organ Transplant Program  [facility]     Zach Larson MD

## 2019-10-25 NOTE — NURSING NOTE
"Chief Complaint   Patient presents with     RECHECK     Follow up Cirrhosis     Vital signs:  Temp: 98.3  F (36.8  C)   BP: 137/85 Pulse: 75     SpO2: 97 %       Weight: 73.7 kg (162 lb 6.4 oz)  Estimated body mass index is 23.98 kg/m  as calculated from the following:    Height as of 4/15/19: 1.753 m (5' 9\").    Weight as of this encounter: 73.7 kg (162 lb 6.4 oz).        Miracle Valentin, CMA    "

## 2019-10-26 NOTE — PROGRESS NOTES
HISTORY OF PRESENT ILLNESS:  I had the pleasure of seeing Brannon Malloy for followup in the Liver Clinic at the [facility] on 10/25/2019.  Mr. Malloy presents for followup of alcoholic cirrhosis.      Unfortunately, he has continued to drink and really does not show any desire to stop drinking altogether.  He also continues to use tobacco.      He, nonetheless, is feeling fairly well.  He denies any abdominal pain, itching or skin rash or fatigue.  He denies any increased abdominal girth or lower extremity edema.  He denies any fevers or chills, cough or shortness of breath.  He denies any nausea or vomiting, diarrhea or constipation.  His appetite has been good, and his weight has largely remained the same.  He has not had any gastrointestinal bleeding or any overt signs of hepatic encephalopathy and there have been no other new events since he was last seen.     Current Outpatient Medications   Medication     ALPRAZolam (XANAX) 0.5 MG tablet     HYDROcodone-acetaminophen (NORCO)  MG per tablet     multivitamin, therapeutic (THERA-VIT) TABS tablet     tamoxifen (NOLVADEX) 20 MG tablet     No current facility-administered medications for this visit.      /85   Pulse 75   Temp 98.3  F (36.8  C)   Wt 73.7 kg (162 lb 6.4 oz)   SpO2 97%   BMI 23.98 kg/m      PHYSICAL EXAMINATION:  In general, he actually looks relatively healthy.  HEENT exam shows no scleral icterus or temporal muscle wasting.  Chest is clear.  Abdominal exam shows no increase in girth.  No masses or tenderness to palpation are present.  His liver is 10 cm in span without left lobe enlargement.  No spleen tip is palpable, and extremity exam shows no edema.  Skin exam shows no stigmata of chronic liver disease.  Neurologic exam shows no asterixis.     LABORATORY:  His most recent laboratory tests show his white count is 4.2, hemoglobin 15, platelets are 84,000.  INR is 1.18.  Sodium 141, potassium 4.4,  BUN is 8, creatinine 0.99.  His AST 60, ALT is 38, alkaline phosphatase is 78.  His albumin is 3.2 with a total protein of 8.0.  His total bilirubin is 1.3 with a direct reacting bilirubin of 0.8.  His alpha-fetoprotein is 5.5.  He did have an abdominal ultrasound which showed a cirrhotic-appearing liver, no focal lesions.  There is no ascites.      IMPRESSION:  My impression is Mr. Malloy has alcoholic cirrhosis which is well compensated at this point in time.  I did strongly advise him to stop all alcohol and tobacco, although he certainly is not ready for that.      He is up to date with regard to variceal screening in that he had variceal screening in [place], [place].  His varices were graded to be 2 out of 5, which I am not familiar with that grading system, although it sounds as though they were small.      He will get a flu vaccine today and will otherwise be up to date on his vaccinations.  I will not be making any other change to his medical regimen.  I will see him back in the clinic again in 6 months for repeat ultrasound and blood work.      Thank you very much for allowing me to participate in the care of this patient.  If you have any questions regarding my recommendations, please do not hesitate to contact me.       Zach Larson MD      Professor of Medicine  [employer]      Executive Medical Director, Solid Organ Transplant Program  [facility]

## 2019-12-03 ENCOUNTER — TELEPHONE (OUTPATIENT)
Dept: ONCOLOGY | Facility: CLINIC | Age: 59
End: 2019-12-03

## 2019-12-03 NOTE — TELEPHONE ENCOUNTER
Tobacco Treatment Program at the [facility] attempted to reach Mr. Malloy on 12/3/2019 regarding the tobacco cessation program to help Mr. Malloy to quit smoking. We will attempt to reach Mr. Malloy another time.     [facility]  Tobacco Treatment Specialist  PH: [phone]

## 2020-04-23 NOTE — TELEPHONE ENCOUNTER
Tobacco Treatment Program at the [facility] attempted to reach Mr. Malloy on 4/23/2020 regarding the tobacco cessation program to help Mr. Malloy to quit smoking. We will attempt to reach Mr. Malloy another time.     [facility]  Tobacco Treatment Specialist  PH: [phone]

## 2020-05-11 ENCOUNTER — VIRTUAL VISIT (OUTPATIENT)
Dept: GASTROENTEROLOGY | Facility: CLINIC | Age: 60
End: 2020-05-11
Attending: INTERNAL MEDICINE
Payer: COMMERCIAL

## 2020-05-11 DIAGNOSIS — K70.30 ALCOHOLIC CIRRHOSIS OF LIVER WITHOUT ASCITES (H): ICD-10-CM

## 2020-05-11 DIAGNOSIS — K70.30 ALCOHOLIC CIRRHOSIS OF LIVER WITHOUT ASCITES (H): Primary | ICD-10-CM

## 2020-05-11 LAB
AFP SERPL-MCNC: 5.5 UG/L (ref 0–8)
ALBUMIN SERPL-MCNC: 3.2 G/DL (ref 3.4–5)
ALP SERPL-CCNC: 70 U/L (ref 40–150)
ALT SERPL W P-5'-P-CCNC: 39 U/L (ref 0–70)
ANION GAP SERPL CALCULATED.3IONS-SCNC: 7 MMOL/L (ref 3–14)
AST SERPL W P-5'-P-CCNC: 60 U/L (ref 0–45)
BILIRUB DIRECT SERPL-MCNC: 0.4 MG/DL (ref 0–0.2)
BILIRUB SERPL-MCNC: 0.9 MG/DL (ref 0.2–1.3)
BUN SERPL-MCNC: 7 MG/DL (ref 7–30)
CALCIUM SERPL-MCNC: 8.9 MG/DL (ref 8.5–10.1)
CHLORIDE SERPL-SCNC: 113 MMOL/L (ref 94–109)
CO2 SERPL-SCNC: 23 MMOL/L (ref 20–32)
CREAT SERPL-MCNC: 0.84 MG/DL (ref 0.66–1.25)
ERYTHROCYTE [DISTWIDTH] IN BLOOD BY AUTOMATED COUNT: 13.2 % (ref 10–15)
GFR SERPL CREATININE-BSD FRML MDRD: >90 ML/MIN/{1.73_M2}
GLUCOSE SERPL-MCNC: 71 MG/DL (ref 70–99)
HCT VFR BLD AUTO: 43.4 % (ref 40–53)
HGB BLD-MCNC: 14.6 G/DL (ref 13.3–17.7)
INR PPP: 1.24 (ref 0.86–1.14)
MCH RBC QN AUTO: 33.4 PG (ref 26.5–33)
MCHC RBC AUTO-ENTMCNC: 33.6 G/DL (ref 31.5–36.5)
MCV RBC AUTO: 99 FL (ref 78–100)
PLATELET # BLD AUTO: 107 10E9/L (ref 150–450)
POTASSIUM SERPL-SCNC: 3.8 MMOL/L (ref 3.4–5.3)
PROT SERPL-MCNC: 7.8 G/DL (ref 6.8–8.8)
RBC # BLD AUTO: 4.37 10E12/L (ref 4.4–5.9)
SODIUM SERPL-SCNC: 143 MMOL/L (ref 133–144)
WBC # BLD AUTO: 4.6 10E9/L (ref 4–11)

## 2020-05-11 PROCEDURE — 85027 COMPLETE CBC AUTOMATED: CPT | Performed by: INTERNAL MEDICINE

## 2020-05-11 PROCEDURE — 82105 ALPHA-FETOPROTEIN SERUM: CPT | Performed by: INTERNAL MEDICINE

## 2020-05-11 PROCEDURE — 36415 COLL VENOUS BLD VENIPUNCTURE: CPT | Performed by: INTERNAL MEDICINE

## 2020-05-11 PROCEDURE — 80076 HEPATIC FUNCTION PANEL: CPT | Performed by: INTERNAL MEDICINE

## 2020-05-11 PROCEDURE — 85610 PROTHROMBIN TIME: CPT | Performed by: INTERNAL MEDICINE

## 2020-05-11 PROCEDURE — 80048 BASIC METABOLIC PNL TOTAL CA: CPT | Performed by: INTERNAL MEDICINE

## 2020-05-11 NOTE — PROGRESS NOTES
"Gilles Malloy is a 59 year old male who is being evaluated via a billable telephone visit.      The patient has been notified of following:     \"This telephone visit will be conducted via a call between you and your physician/provider. We have found that certain health care needs can be provided without the need for a physical exam.  This service lets us provide the care you need with a short phone conversation.  If a prescription is necessary we can send it directly to your pharmacy.  If lab work is needed we can place an order for that and you can then stop by our lab to have the test done at a later time.    Telephone visits are billed at different rates depending on your insurance coverage. During this emergency period, for some insurers they may be billed the same as an in-person visit.  Please reach out to your insurance provider with any questions.    If during the course of the call the physician/provider feels a telephone visit is not appropriate, you will not be charged for this service.\"    Patient has given verbal consent for Telephone visit?  Yes    What phone number would you like to be contacted at? [phone]    How would you like to obtain your AVS? Mail a copy    I had the pleasure of seeing Brannon Malloy for followup in the Liver Clinic at the [facility] on 05/11/2020.  Mr. Malloy presents for followup of alcoholic cirrhosis.      Unfortunately, he has continued to drink and really does not show any desire to stop drinking altogether.    He reports drinking about 4 beers per week.  He also continues to use tobacco.      He, nonetheless, is feeling fairly well.  He denies any abdominal pain, itching or skin rash or fatigue.  He denies any increased abdominal girth or lower extremity edema.      He denies any fevers or chills, cough or shortness of breath.  He denies any nausea or vomiting, diarrhea or constipation.  His appetite has been good, and his " weight has largely remained the same.      He has not had any gastrointestinal bleeding or any overt signs of hepatic encephalopathy and there have been no other new events since he was last seen.   Current Outpatient Medications   Medication     ALPRAZolam (XANAX) 0.5 MG tablet     HYDROcodone-acetaminophen (NORCO)  MG per tablet     multivitamin, therapeutic (THERA-VIT) TABS tablet     tamoxifen (NOLVADEX) 20 MG tablet     No current facility-administered medications for this visit.      On physical exam, he sounds well.  His affect was appropriate.      Recent Results (from the past 168 hour(s))   AFP tumor marker [EEO571]    Collection Time: 05/11/20 12:19 PM   Result Value Ref Range    Alpha Fetoprotein 5.5 0 - 8 ug/L   INR [LIH2133]    Collection Time: 05/11/20 12:19 PM   Result Value Ref Range    INR 1.24 (H) 0.86 - 1.14   CBC with platelets [GDR126]    Collection Time: 05/11/20 12:19 PM   Result Value Ref Range    WBC 4.6 4.0 - 11.0 10e9/L    RBC Count 4.37 (L) 4.4 - 5.9 10e12/L    Hemoglobin 14.6 13.3 - 17.7 g/dL    Hematocrit 43.4 40.0 - 53.0 %    MCV 99 78 - 100 fl    MCH 33.4 (H) 26.5 - 33.0 pg    MCHC 33.6 31.5 - 36.5 g/dL    RDW 13.2 10.0 - 15.0 %    Platelet Count 107 (L) 150 - 450 10e9/L   Basic metabolic panel [LAB15]    Collection Time: 05/11/20 12:19 PM   Result Value Ref Range    Sodium 143 133 - 144 mmol/L    Potassium 3.8 3.4 - 5.3 mmol/L    Chloride 113 (H) 94 - 109 mmol/L    Carbon Dioxide 23 20 - 32 mmol/L    Anion Gap 7 3 - 14 mmol/L    Glucose 71 70 - 99 mg/dL    Urea Nitrogen 7 7 - 30 mg/dL    Creatinine 0.84 0.66 - 1.25 mg/dL    GFR Estimate >90 >60 mL/min/[1.73_m2]    GFR Estimate If Black >90 >60 mL/min/[1.73_m2]    Calcium 8.9 8.5 - 10.1 mg/dL   Hepatic Panel [LAB20]    Collection Time: 05/11/20 12:19 PM   Result Value Ref Range    Bilirubin Direct 0.4 (H) 0.0 - 0.2 mg/dL    Bilirubin Total 0.9 0.2 - 1.3 mg/dL    Albumin 3.2 (L) 3.4 - 5.0 g/dL    Protein Total 7.8 6.8 - 8.8 g/dL     Alkaline Phosphatase 70 40 - 150 U/L    ALT 39 0 - 70 U/L    AST 60 (H) 0 - 45 U/L      My impression is Mr. Malloy has alcoholic cirrhosis which is well compensated at this point in time.  I did strongly advise him to stop all alcohol and tobacco, although he certainly is not ready for that.      He is up to date with regard to variceal screening in that he had variceal screening in [place], [place].        I will not be making any other change to his medical regimen.  I will see him back in the clinic again in 6 months for repeat ultrasound and blood work.     Thank you very much for allowing me to participate in the care of this patient.  If you have any questions regarding my recommendations, please do not hesitate to contact me.         Zach Lasron MD      Professor of Medicine  [employer]      Executive Medical Director, Solid Organ Transplant Program  [facility]     Phone call duration: 15 minutes and 10 minutes for documentation

## 2020-05-11 NOTE — PROGRESS NOTES
Attempted to reach for telephone visit, no answer, left vm writer will call back.    Solange Esquivel [facility]  5/11/2020 1:37 PM      Attempted to reach, no answer, vm left that writer will call back.    Solange Esquivel [facility]  5/11/2020 1:58 PM    Attempted to reach pt, no answer.    Solange Esquivel [facility]  5/11/2020 2:21 PM    Attempted to reach, no answer, left vm that provider will be calling him within the next 15 min.    Solange Esquivel [facility]  5/11/2020 2:32 PM

## 2021-06-29 ENCOUNTER — APPOINTMENT (OUTPATIENT)
Dept: GENERAL RADIOLOGY | Facility: CLINIC | Age: 61
End: 2021-06-29
Attending: EMERGENCY MEDICINE
Payer: COMMERCIAL

## 2021-06-29 ENCOUNTER — ANCILLARY PROCEDURE (OUTPATIENT)
Dept: ULTRASOUND IMAGING | Facility: CLINIC | Age: 61
End: 2021-06-29
Attending: EMERGENCY MEDICINE
Payer: COMMERCIAL

## 2021-06-29 ENCOUNTER — HOSPITAL ENCOUNTER (EMERGENCY)
Facility: CLINIC | Age: 61
Discharge: HOME | End: 2021-06-30
Attending: EMERGENCY MEDICINE | Admitting: EMERGENCY MEDICINE
Payer: COMMERCIAL

## 2021-06-29 DIAGNOSIS — R06.02 SHORTNESS OF BREATH: ICD-10-CM

## 2021-06-29 DIAGNOSIS — D62 ANEMIA DUE TO BLOOD LOSS, ACUTE: ICD-10-CM

## 2021-06-29 DIAGNOSIS — D64.9 ANEMIA, UNSPECIFIED TYPE: Primary | ICD-10-CM

## 2021-06-29 DIAGNOSIS — K70.31 ALCOHOLIC CIRRHOSIS OF LIVER WITH ASCITES (H): ICD-10-CM

## 2021-06-29 DIAGNOSIS — R14.0 ABDOMINAL DISTENTION: ICD-10-CM

## 2021-06-29 DIAGNOSIS — K92.1 GASTROINTESTINAL HEMORRHAGE WITH MELENA: ICD-10-CM

## 2021-06-29 DIAGNOSIS — Z11.52 ENCOUNTER FOR SCREENING LABORATORY TESTING FOR COVID-19 VIRUS: ICD-10-CM

## 2021-06-29 LAB
ABO + RH BLD: NORMAL
ABO + RH BLD: NORMAL
ALBUMIN SERPL-MCNC: 2.3 G/DL (ref 3.4–5)
ALP SERPL-CCNC: 67 U/L (ref 40–150)
ALT SERPL W P-5'-P-CCNC: 31 U/L (ref 0–70)
ANION GAP SERPL CALCULATED.3IONS-SCNC: 4 MMOL/L (ref 3–14)
AST SERPL W P-5'-P-CCNC: 50 U/L (ref 0–45)
BASOPHILS # BLD AUTO: 0 10E9/L (ref 0–0.2)
BASOPHILS NFR BLD AUTO: 0.5 %
BILIRUB SERPL-MCNC: 1.3 MG/DL (ref 0.2–1.3)
BLD GP AB SCN SERPL QL: NORMAL
BLD PROD TYP BPU: NORMAL
BLD UNIT ID BPU: 0
BLD UNIT ID BPU: 0
BLOOD BANK CMNT PATIENT-IMP: NORMAL
BLOOD PRODUCT CODE: NORMAL
BLOOD PRODUCT CODE: NORMAL
BPU ID: NORMAL
BPU ID: NORMAL
BUN SERPL-MCNC: 14 MG/DL (ref 7–30)
CALCIUM SERPL-MCNC: 8.1 MG/DL (ref 8.5–10.1)
CHLORIDE SERPL-SCNC: 110 MMOL/L (ref 94–109)
CO2 SERPL-SCNC: 23 MMOL/L (ref 20–32)
CREAT SERPL-MCNC: 1.12 MG/DL (ref 0.66–1.25)
DIFFERENTIAL METHOD BLD: ABNORMAL
EOSINOPHIL # BLD AUTO: 0.3 10E9/L (ref 0–0.7)
EOSINOPHIL NFR BLD AUTO: 4.8 %
ERYTHROCYTE [DISTWIDTH] IN BLOOD BY AUTOMATED COUNT: 17.2 % (ref 10–15)
GFR SERPL CREATININE-BSD FRML MDRD: 70 ML/MIN/{1.73_M2}
GLUCOSE SERPL-MCNC: 87 MG/DL (ref 70–99)
HCT VFR BLD AUTO: 20.5 % (ref 40–53)
HGB BLD-MCNC: 6.4 G/DL (ref 13.3–17.7)
IMM GRANULOCYTES # BLD: 0 10E9/L (ref 0–0.4)
IMM GRANULOCYTES NFR BLD: 0.3 %
INR PPP: 1.47 (ref 0.86–1.14)
LABORATORY COMMENT REPORT: NORMAL
LYMPHOCYTES # BLD AUTO: 0.7 10E9/L (ref 0.8–5.3)
LYMPHOCYTES NFR BLD AUTO: 12.1 %
MCH RBC QN AUTO: 32.3 PG (ref 26.5–33)
MCHC RBC AUTO-ENTMCNC: 31.2 G/DL (ref 31.5–36.5)
MCV RBC AUTO: 104 FL (ref 78–100)
MONOCYTES # BLD AUTO: 0.6 10E9/L (ref 0–1.3)
MONOCYTES NFR BLD AUTO: 9.5 %
NEUTROPHILS # BLD AUTO: 4.2 10E9/L (ref 1.6–8.3)
NEUTROPHILS NFR BLD AUTO: 72.8 %
NRBC # BLD AUTO: 0 10*3/UL
NRBC BLD AUTO-RTO: 0 /100
NUM BPU REQUESTED: 2
PLATELET # BLD AUTO: 199 10E9/L (ref 150–450)
POTASSIUM SERPL-SCNC: 4.1 MMOL/L (ref 3.4–5.3)
PROT SERPL-MCNC: 7.2 G/DL (ref 6.8–8.8)
RBC # BLD AUTO: 1.98 10E12/L (ref 4.4–5.9)
SARS-COV-2 RNA RESP QL NAA+PROBE: NEGATIVE
SODIUM SERPL-SCNC: 137 MMOL/L (ref 133–144)
SPECIMEN EXP DATE BLD: NORMAL
SPECIMEN SOURCE: NORMAL
TRANSFUSION STATUS PATIENT QL: NORMAL
WBC # BLD AUTO: 5.8 10E9/L (ref 4–11)

## 2021-06-29 PROCEDURE — 96368 THER/DIAG CONCURRENT INF: CPT | Performed by: EMERGENCY MEDICINE

## 2021-06-29 PROCEDURE — 93975 VASCULAR STUDY: CPT | Mod: 26 | Performed by: RADIOLOGY

## 2021-06-29 PROCEDURE — 86901 BLOOD TYPING SEROLOGIC RH(D): CPT | Performed by: EMERGENCY MEDICINE

## 2021-06-29 PROCEDURE — 250N000011 HC RX IP 250 OP 636: Performed by: EMERGENCY MEDICINE

## 2021-06-29 PROCEDURE — 87635 SARS-COV-2 COVID-19 AMP PRB: CPT | Performed by: EMERGENCY MEDICINE

## 2021-06-29 PROCEDURE — 86923 COMPATIBILITY TEST ELECTRIC: CPT | Performed by: EMERGENCY MEDICINE

## 2021-06-29 PROCEDURE — 71046 X-RAY EXAM CHEST 2 VIEWS: CPT

## 2021-06-29 PROCEDURE — 96366 THER/PROPH/DIAG IV INF ADDON: CPT | Performed by: EMERGENCY MEDICINE

## 2021-06-29 PROCEDURE — 258N000003 HC RX IP 258 OP 636

## 2021-06-29 PROCEDURE — 86850 RBC ANTIBODY SCREEN: CPT | Performed by: EMERGENCY MEDICINE

## 2021-06-29 PROCEDURE — 76705 ECHO EXAM OF ABDOMEN: CPT | Performed by: EMERGENCY MEDICINE

## 2021-06-29 PROCEDURE — 96365 THER/PROPH/DIAG IV INF INIT: CPT | Performed by: EMERGENCY MEDICINE

## 2021-06-29 PROCEDURE — 86900 BLOOD TYPING SEROLOGIC ABO: CPT | Performed by: EMERGENCY MEDICINE

## 2021-06-29 PROCEDURE — 99285 EMERGENCY DEPT VISIT HI MDM: CPT | Mod: 25 | Performed by: EMERGENCY MEDICINE

## 2021-06-29 PROCEDURE — C9803 HOPD COVID-19 SPEC COLLECT: HCPCS | Performed by: EMERGENCY MEDICINE

## 2021-06-29 PROCEDURE — 36430 TRANSFUSION BLD/BLD COMPNT: CPT | Performed by: EMERGENCY MEDICINE

## 2021-06-29 PROCEDURE — P9016 RBC LEUKOCYTES REDUCED: HCPCS | Performed by: EMERGENCY MEDICINE

## 2021-06-29 PROCEDURE — C9113 INJ PANTOPRAZOLE SODIUM, VIA: HCPCS | Performed by: EMERGENCY MEDICINE

## 2021-06-29 PROCEDURE — 85025 COMPLETE CBC W/AUTO DIFF WBC: CPT | Performed by: EMERGENCY MEDICINE

## 2021-06-29 PROCEDURE — 76705 ECHO EXAM OF ABDOMEN: CPT | Mod: 26 | Performed by: EMERGENCY MEDICINE

## 2021-06-29 PROCEDURE — 80053 COMPREHEN METABOLIC PANEL: CPT | Performed by: EMERGENCY MEDICINE

## 2021-06-29 PROCEDURE — 85610 PROTHROMBIN TIME: CPT | Performed by: EMERGENCY MEDICINE

## 2021-06-29 PROCEDURE — 258N000003 HC RX IP 258 OP 636: Performed by: EMERGENCY MEDICINE

## 2021-06-29 PROCEDURE — 93975 VASCULAR STUDY: CPT

## 2021-06-29 RX ORDER — CEFTRIAXONE 1 G/1
1 INJECTION, POWDER, FOR SOLUTION INTRAMUSCULAR; INTRAVENOUS ONCE
Status: COMPLETED | OUTPATIENT
Start: 2021-06-29 | End: 2021-06-29

## 2021-06-29 RX ORDER — SODIUM CHLORIDE 9 MG/ML
INJECTION, SOLUTION INTRAVENOUS
Status: COMPLETED
Start: 2021-06-29 | End: 2021-06-29

## 2021-06-29 RX ORDER — SODIUM CHLORIDE 9 MG/ML
INJECTION, SOLUTION INTRAVENOUS
Status: COMPLETED
Start: 2021-06-29 | End: 2021-06-30

## 2021-06-29 RX ORDER — LORAZEPAM 1 MG/1
1-4 TABLET ORAL EVERY 30 MIN PRN
Status: DISCONTINUED | OUTPATIENT
Start: 2021-06-29 | End: 2021-06-30 | Stop reason: HOSPADM

## 2021-06-29 RX ADMIN — SODIUM CHLORIDE 1000 ML: 9 INJECTION, SOLUTION INTRAVENOUS at 23:34

## 2021-06-29 RX ADMIN — SODIUM CHLORIDE 1000 ML: 9 INJECTION, SOLUTION INTRAVENOUS at 18:14

## 2021-06-29 RX ADMIN — SODIUM CHLORIDE 8 MG/HR: 9 INJECTION, SOLUTION INTRAVENOUS at 18:12

## 2021-06-29 RX ADMIN — SODIUM CHLORIDE 1000 ML: 9 INJECTION, SOLUTION INTRAVENOUS at 18:59

## 2021-06-29 RX ADMIN — OCTREOTIDE ACETATE 50 MCG/HR: 200 INJECTION, SOLUTION INTRAVENOUS; SUBCUTANEOUS at 20:51

## 2021-06-29 RX ADMIN — CEFTRIAXONE SODIUM 1 G: 1 INJECTION, POWDER, FOR SOLUTION INTRAMUSCULAR; INTRAVENOUS at 18:59

## 2021-06-29 ASSESSMENT — ENCOUNTER SYMPTOMS
FREQUENCY: 0
PALPITATIONS: 0
HEMATURIA: 0
DYSURIA: 0
SHORTNESS OF BREATH: 1
ABDOMINAL PAIN: 0
DIFFICULTY URINATING: 0
FEVER: 0

## 2021-06-29 NOTE — LETTER
July 2, 2021      To Whom It May Concern:      Gilles Malloy was seen in our Emergency Department today, 07/02/21.  I expect his condition to improve over the next 5 days.  He may return to work/school on 7/6/2021.    Sincerely,        Gerard Quiroga MD

## 2021-06-29 NOTE — ED PROVIDER NOTES
[facility] EMERGENCY DEPARTMENT ([facility])    6/29/21        History     Chief Complaint   Patient presents with     Shortness of Breath     Reports hx of ascites, pt reports he has felt this way before, gets SOB upon exertion.      Flank Pain     Reports bilateral flank pain, has had similar issues since 2013, reports gallbladder removed.      The history is provided by the patient and medical records.     Gilles Malloy is a 61 year old male who presents to the Emergency Department with shortness of breath. Patient has a history of left-sided breast cancer (on tamoxifen) s/p left mastectomy (11/21/2018) and alcoholic liver disease with ascites. Patient reports shortness of breath beginning yesterday. He noticed this after taking out the garbage, states it improves with rest. Patient reports Xanax and Norco also improve shortness of breath. Patient reports history of ascites, states he last had a paracentesis about 5 years ago. Current shortness of breath feels similar to those previous episodes where he needed paracentesis. Patient notes with prior episodes he has had tenderness around his hips wrapping to his back. Patient denies abdominal pain. No fever, chest pain, palpitations, or urinary symptoms. His last BM was yesterday. Patient reports he is drinking 2-3 beers per day and 1-2 bottles of wine per week for the past month. He states this is down from about a quart of scotch per day. Patient endorses tobacco use, smokes half a pack of cigarettes per day. He denies illicit drug use.     Past Medical History  Past Medical History:   Diagnosis Date     Alcoholic liver disease (H) 2010    Pt has cut down on alcoholic intake and has not needed a paracentesis since 2016 or 2017     Alcoholism (H)      Breast cancer (H) 11/2018    Lt sided     Past Surgical History:   Procedure Laterality Date     CHOLECYSTECTOMY       HERNIA REPAIR      x 2     MANDIBLE SURGERY      Dental jaw surgery 2008      MASTECTOMY Left 11/21/2018     RECONSTRUCT NOSE AND SEPTUM (FUNCTIONAL)       ALPRAZolam (XANAX) 0.5 MG tablet  HYDROcodone-acetaminophen (NORCO)  MG per tablet  multivitamin, therapeutic (THERA-VIT) TABS tablet  tamoxifen (NOLVADEX) 20 MG tablet      Allergies   Allergen Reactions     Penicillins      Sulfa Drugs      Family History  Family History   Problem Relation Age of Onset     Prostate Cancer Father      Aplastic anemia Sister      Social History   Social History     Tobacco Use     Smoking status: Current Every Day Smoker     Packs/day: 0.75     Smokeless tobacco: Never Used   Substance Use Topics     Alcohol use: Yes     Comment: daily 2-4 beers or couple glasses of wine     Drug use: Not on file      Past medical history, past surgical history, medications, allergies, family history, and social history were reviewed with the patient. No additional pertinent items.       Review of Systems   Constitutional: Negative for fever.   Respiratory: Positive for shortness of breath.    Cardiovascular: Negative for chest pain and palpitations.   Gastrointestinal: Negative for abdominal pain.   Genitourinary: Negative for difficulty urinating, dysuria, frequency, hematuria and urgency.   All other systems reviewed and are negative.    A complete review of systems was performed with pertinent positives and negatives noted in the HPI, and all other systems negative.    Physical Exam   BP: 128/58  Pulse: 112  Temp: 99.5  F (37.5  C)  Resp: 16  SpO2: 98 %  Physical Exam    GEN:  Alert, well developed, no acute distress  HEENT:  PERRL, EOMI, Mucous membranes are moist.   Cardio:  RRR, no murmur, radial pulses equal bilaterally  PULM:  Lungs clear, good air movement, no wheezes, rales   Abd:  Soft, normal bowel sounds, no focal tenderness, mildly distended  Back exam:  No CVA tenderness  Musculoskeletal:  normal range of motion, no lower extremity swelling or calf tenderness  Neuro:  Alert and oriented X3, Follows  commands, moving all extremities spontaneously   Skin:  Warm, dry   ED Course      Procedures  Results for orders placed during the hospital encounter of 06/29/21   POC US ABDOMEN LIMITED    Impression Limited Bedside Abdominal Ultrasound, performed and interpreted by me.     Indication: Abdominal swelling. Evaluate for Free fluid.     With the patient in Trendelenburg, the RUQ, LUQ, (including the paracolic gutters) views were examined for free fluid. With the patient in reverse Trendelenburg, the suprapubic view was examined for free fluid.     Findings: Free fluid present in both RUQ and LUQ    IMPRESSION: Free fluid present as noted above.            Chest x-ray was reviewed by me and results are shown below.  Bedside ultrasound was done by myself and does confirm ascites fluid.  Labs were reviewed by me and results are shown below.  Patient has significant anemia, this is new compared to previous lab values.  Review of the chart reveals patient had a screening upper endoscopy done in [place] 2 years ago and it showed grade 2 out of 5 esophageal varices with gastritis and evidence of peptic ulcer disease with an ulcer in the duodenum.   I asked patient if he has had any blood in his stools.  He thinks that his stools have been normal for the past week, however, prior to that he had had some dark tarry looking stools.  Stool guaiac test was done with digital rectal exam with nurse present.  The stool appears to look normal in color, however, is guaiac positive.     Results for orders placed or performed during the hospital encounter of 06/29/21   XR Chest 2 Views     Status: None    Narrative    CHEST TWO VIEWS 6/29/2021 3:18 PM     HISTORY: Shortness of breath.    COMPARISON: None.    FINDINGS: Linear opacity at the left lung base likely scarring or  subsegmental atelectasis. The lungs are otherwise clear. No  pneumothorax or pleural effusion. Surgical clips noted in the left  axilla.       Impression     IMPRESSION: No radiographic evidence of acute chest abnormality.     AGUS PUGA MD   POC US ABDOMEN LIMITED     Status: None    Impression    Limited Bedside Abdominal Ultrasound, performed and interpreted by me.     Indication: Abdominal swelling. Evaluate for Free fluid.     With the patient in Trendelenburg, the RUQ, LUQ, (including the paracolic gutters) views were examined for free fluid. With the patient in reverse Trendelenburg, the suprapubic view was examined for free fluid.     Findings: Free fluid present in both RUQ and LUQ    IMPRESSION: Free fluid present as noted above.     CBC with platelets differential     Status: Abnormal   Result Value Ref Range    WBC 5.8 4.0 - 11.0 10e9/L    RBC Count 1.98 (L) 4.4 - 5.9 10e12/L    Hemoglobin 6.4 (LL) 13.3 - 17.7 g/dL    Hematocrit 20.5 (L) 40.0 - 53.0 %     (H) 78 - 100 fl    MCH 32.3 26.5 - 33.0 pg    MCHC 31.2 (L) 31.5 - 36.5 g/dL    RDW 17.2 (H) 10.0 - 15.0 %    Platelet Count 199 150 - 450 10e9/L    Diff Method Automated Method     % Neutrophils 72.8 %    % Lymphocytes 12.1 %    % Monocytes 9.5 %    % Eosinophils 4.8 %    % Basophils 0.5 %    % Immature Granulocytes 0.3 %    Nucleated RBCs 0 0 /100    Absolute Neutrophil 4.2 1.6 - 8.3 10e9/L    Absolute Lymphocytes 0.7 (L) 0.8 - 5.3 10e9/L    Absolute Monocytes 0.6 0.0 - 1.3 10e9/L    Absolute Eosinophils 0.3 0.0 - 0.7 10e9/L    Absolute Basophils 0.0 0.0 - 0.2 10e9/L    Abs Immature Granulocytes 0.0 0 - 0.4 10e9/L    Absolute Nucleated RBC 0.0    Comprehensive metabolic panel     Status: Abnormal   Result Value Ref Range    Sodium 137 133 - 144 mmol/L    Potassium 4.1 3.4 - 5.3 mmol/L    Chloride 110 (H) 94 - 109 mmol/L    Carbon Dioxide 23 20 - 32 mmol/L    Anion Gap 4 3 - 14 mmol/L    Glucose 87 70 - 99 mg/dL    Urea Nitrogen 14 7 - 30 mg/dL    Creatinine 1.12 0.66 - 1.25 mg/dL    GFR Estimate 70 >60 mL/min/[1.73_m2]    GFR Estimate If Black 81 >60 mL/min/[1.73_m2]    Calcium 8.1 (L) 8.5 -  10.1 mg/dL    Bilirubin Total 1.3 0.2 - 1.3 mg/dL    Albumin 2.3 (L) 3.4 - 5.0 g/dL    Protein Total 7.2 6.8 - 8.8 g/dL    Alkaline Phosphatase 67 40 - 150 U/L    ALT 31 0 - 70 U/L    AST 50 (H) 0 - 45 U/L   INR     Status: Abnormal   Result Value Ref Range    INR 1.47 (H) 0.86 - 1.14   ABO/Rh type and screen     Status: None   Result Value Ref Range    Units Ordered 2     ABO O     RH(D) Pos     Antibody Screen Neg     Test Valid Only At          [facility]    Specimen Expires 07/02/2021     Crossmatch Red Blood Cells    Blood component     Status: None   Result Value Ref Range    Unit Number [id number]     Blood Component Type Red Blood Cells Leukocyte Reduced     Division Number 00     Status of Unit Ready for patient 06/29/2021 1850     Blood Product Code [id number]     Unit Status ROMERO    Blood component     Status: None   Result Value Ref Range    Unit Number [id number]     Blood Component Type Red Blood Cells Leukocyte Reduced     Division Number 00     Status of Unit Ready for patient 06/29/2021 1850     Blood Product Code [id number]     Unit Status ROMERO      Medications   pantoprazole (PROTONIX) 80 mg in sodium chloride 0.9 % 100 mL infusion (8 mg/hr Intravenous New Bag 6/29/21 1812)   octreotide (sandoSTATIN) 1,250 mcg in sodium chloride 0.9 % 250 mL (has no administration in time range)   cefTRIAXone (ROCEPHIN) 1 g vial to attach to  mL bag for ADULTS or NS 50 mL bag for PEDS (1 g Intravenous New Bag 6/29/21 1859)   LORazepam (ATIVAN) tablet 1-4 mg (has no administration in time range)   sodium chloride 0.9 % infusion (1,000 mLs  New Bag 6/29/21 1814)   sodium chloride 0.9 % infusion (1,000 mLs  New Bag 6/29/21 1859)        Assessments & Plan (with Medical Decision Making)   Patient presents with primary complaint of shortness of breath with activity.  Work-up has revealed significant anemia with positive stool guaiac test.  Patient has had previous  endoscopy showing grade 2 esophageal varices as well as duodenal ulcer.  Patient was discussed with the GI fellow and she was in agreement with starting Protonix drip, octreotide drip, ceftriaxone, blood transfusion and transferred to [place] for admission with likely endoscopy tomorrow.  Patient is to be n.p.o. after midnight.  Liver ultrasound with Dopplers been ordered at the request of GI.  They have also suggested diagnostic paracentesis.  I have spoken to the medicine triage hospitalist about this and they indicated to me that they would arrange for the diagnostic paracentesis.    I have reviewed the nursing notes. I have reviewed the findings, diagnosis, plan and need for follow up with the patient.    New Prescriptions    No medications on file       Final diagnoses:   Shortness of breath   Alcoholic cirrhosis of liver with ascites (H)   Anemia due to blood loss, acute   Gastrointestinal hemorrhage with melena     I, Amanda Durant, am serving as a trained medical scribe to document services personally performed by Loulou Paul MD, based on the provider's statements to me.      I, Loulou Paul MD, was physically present and have reviewed and verified the accuracy of this note documented by Amanda Durant.     --  Loulou Paul MD  [facility] EMERGENCY DEPARTMENT  6/29/2021     Loulou Paul MD  06/29/21 1903

## 2021-06-29 NOTE — PROGRESS NOTES
Patient presents today with complaints of abdominal swelling. Patient reports this has occurred before but has gotten worse since Saturday. SOB accompanies the abdominal swelling but reports this is similar to when this has occurred in the past. Denies any fever, chills, or other respiratory symptoms. Patient reports fullness in the abdomen but denies and nausea or vomiting. BM yesterday. Does report some pain in the hips bilaterally with the swelling. Per physical assessment, patients abdomen does appear distended and taught. Patient has equal diminished/clear lung sounds. Denies urinary complications.

## 2021-06-30 ENCOUNTER — TELEPHONE (OUTPATIENT)
Dept: GASTROENTEROLOGY | Facility: CLINIC | Age: 61
End: 2021-06-30

## 2021-06-30 VITALS
OXYGEN SATURATION: 98 % | DIASTOLIC BLOOD PRESSURE: 63 MMHG | TEMPERATURE: 97.4 F | RESPIRATION RATE: 16 BRPM | SYSTOLIC BLOOD PRESSURE: 107 MMHG | HEART RATE: 77 BPM

## 2021-06-30 LAB — HGB BLD-MCNC: 8 G/DL (ref 13.3–17.7)

## 2021-06-30 PROCEDURE — 85018 HEMOGLOBIN: CPT | Performed by: EMERGENCY MEDICINE

## 2021-06-30 RX ORDER — FUROSEMIDE 20 MG
20 TABLET ORAL DAILY
Qty: 14 TABLET | Refills: 0 | Status: SHIPPED | OUTPATIENT
Start: 2021-06-30 | End: 2021-08-18 | Stop reason: DRUGHIGH

## 2021-06-30 NOTE — PROGRESS NOTES
Informed pt in ED at [facility]  Reviewed chart and history    RECOMMENDATIONS  - Stop IV PPI  - Stop Octreotide  - Okay to stop antibiotics as not overt GI bleeding  - Hepatology service will arrange for OUTPATIENT EGD/Colon  - Dispo if hemoglobin stable from a hepatology perspective; defer to primary for ultimate dispo plan  - Consider diuretics on dispo    Thomas M. Leventhal, M.D.   of Medicine  Advanced/Transplant Hepatology & Critical Care Medicine  [facility]  June 30, 2021 11:48 AM

## 2021-06-30 NOTE — ED PROVIDER NOTES
Emergency Department Patient Sign-out       Brief HPI:  This is a 61 year old male signed out to me by Dr. Kenyon .  See initial ED Provider note for details of the presentation.            Significant Events prior to my assuming care: 61-year-old male who presented with shortness of breath and alcoholic liver disease with need for assistance with detox.  Was found to be significantly anemic, likely due to GI bleed.  Patient was transfused 2 units of red cells with improvement in hemoglobin to 8.0.  Currently with stable vital signs and requesting breakfast.      Exam:   Patient Vitals for the past 24 hrs:   BP Temp Temp src Pulse Resp SpO2   06/30/21 0553 100/74 97.4  F (36.3  C) Oral 74 17 98 %   06/30/21 0231 -- -- -- -- -- 97 %   06/29/21 2337 114/62 98.6  F (37  C) Oral 84 16 100 %   06/29/21 2328 115/62 98.6  F (37  C) Oral 87 16 100 %   06/29/21 2314 -- 98.4  F (36.9  C) Oral -- 16 100 %   06/29/21 2313 118/58 -- -- 88 -- --   06/29/21 2120 112/62 98.9  F (37.2  C) -- 85 -- 100 %   06/29/21 2054 118/82 99  F (37.2  C) Oral 90 16 100 %   06/29/21 2042 124/71 98.7  F (37.1  C) Oral 89 16 100 %   06/29/21 1733 -- -- -- -- 16 100 %   06/29/21 1732 127/74 -- -- 92 -- --   06/29/21 1229 128/58 99.5  F (37.5  C) Oral 112 16 98 %       EXAM:  HEENT: Normal.  Oropharynx clear and moist.  Neck: Supple, trachea midline, normal voice  Chest:  No respiratory distress, speaks in complete sentences, chest wall nontender, lungs clear in all fields  CV: Regular rate and rhythm, no murmur, normal pulse, no jugular venous distention  Abdomen: Nondistended, soft nontender.  No hepatosplenomegaly.  Extremities: No edema or tenderness        ED RESULTS:   Results for orders placed or performed during the hospital encounter of 06/29/21 (from the past 24 hour(s))   POC US ABDOMEN LIMITED     Status: None    Collection Time: 06/29/21  2:48 PM    Impression    Limited Bedside Abdominal Ultrasound, performed and interpreted by  me.     Indication: Abdominal swelling. Evaluate for Free fluid.     With the patient in Trendelenburg, the RUQ, LUQ, (including the paracolic gutters) views were examined for free fluid. With the patient in reverse Trendelenburg, the suprapubic view was examined for free fluid.     Findings: Free fluid present in both RUQ and LUQ    IMPRESSION: Free fluid present as noted above.     XR Chest 2 Views     Status: None    Collection Time: 06/29/21  3:18 PM    Narrative    CHEST TWO VIEWS 6/29/2021 3:18 PM     HISTORY: Shortness of breath.    COMPARISON: None.    FINDINGS: Linear opacity at the left lung base likely scarring or  subsegmental atelectasis. The lungs are otherwise clear. No  pneumothorax or pleural effusion. Surgical clips noted in the left  axilla.       Impression    IMPRESSION: No radiographic evidence of acute chest abnormality.     AGUS PUGA MD   CBC with platelets differential     Status: Abnormal    Collection Time: 06/29/21  3:44 PM   Result Value Ref Range    WBC 5.8 4.0 - 11.0 10e9/L    RBC Count 1.98 (L) 4.4 - 5.9 10e12/L    Hemoglobin 6.4 (LL) 13.3 - 17.7 g/dL    Hematocrit 20.5 (L) 40.0 - 53.0 %     (H) 78 - 100 fl    MCH 32.3 26.5 - 33.0 pg    MCHC 31.2 (L) 31.5 - 36.5 g/dL    RDW 17.2 (H) 10.0 - 15.0 %    Platelet Count 199 150 - 450 10e9/L    Diff Method Automated Method     % Neutrophils 72.8 %    % Lymphocytes 12.1 %    % Monocytes 9.5 %    % Eosinophils 4.8 %    % Basophils 0.5 %    % Immature Granulocytes 0.3 %    Nucleated RBCs 0 0 /100    Absolute Neutrophil 4.2 1.6 - 8.3 10e9/L    Absolute Lymphocytes 0.7 (L) 0.8 - 5.3 10e9/L    Absolute Monocytes 0.6 0.0 - 1.3 10e9/L    Absolute Eosinophils 0.3 0.0 - 0.7 10e9/L    Absolute Basophils 0.0 0.0 - 0.2 10e9/L    Abs Immature Granulocytes 0.0 0 - 0.4 10e9/L    Absolute Nucleated RBC 0.0    Comprehensive metabolic panel     Status: Abnormal    Collection Time: 06/29/21  3:44 PM   Result Value Ref Range    Sodium 137 133 - 144  mmol/L    Potassium 4.1 3.4 - 5.3 mmol/L    Chloride 110 (H) 94 - 109 mmol/L    Carbon Dioxide 23 20 - 32 mmol/L    Anion Gap 4 3 - 14 mmol/L    Glucose 87 70 - 99 mg/dL    Urea Nitrogen 14 7 - 30 mg/dL    Creatinine 1.12 0.66 - 1.25 mg/dL    GFR Estimate 70 >60 mL/min/[1.73_m2]    GFR Estimate If Black 81 >60 mL/min/[1.73_m2]    Calcium 8.1 (L) 8.5 - 10.1 mg/dL    Bilirubin Total 1.3 0.2 - 1.3 mg/dL    Albumin 2.3 (L) 3.4 - 5.0 g/dL    Protein Total 7.2 6.8 - 8.8 g/dL    Alkaline Phosphatase 67 40 - 150 U/L    ALT 31 0 - 70 U/L    AST 50 (H) 0 - 45 U/L   INR     Status: Abnormal    Collection Time: 06/29/21  3:44 PM   Result Value Ref Range    INR 1.47 (H) 0.86 - 1.14   ABO/Rh type and screen     Status: None    Collection Time: 06/29/21  3:44 PM   Result Value Ref Range    Units Ordered 2     ABO O     RH(D) Pos     Antibody Screen Neg     Test Valid Only At          [facility]    Specimen Expires 07/02/2021     Crossmatch Red Blood Cells    Blood component     Status: None    Collection Time: 06/29/21  3:44 PM   Result Value Ref Range    Unit Number [id number]     Blood Component Type Red Blood Cells Leukocyte Reduced     Division Number 00     Status of Unit Released to care unit     Blood Product Code [id number]     Unit Status ISS    Blood component     Status: None    Collection Time: 06/29/21  3:44 PM   Result Value Ref Range    Unit Number [id number]     Blood Component Type Red Blood Cells Leukocyte Reduced     Division Number 00     Status of Unit Released to care unit     Blood Product Code [id number]     Unit Status ISS    Asymptomatic SARS-CoV-2 COVID-19 Virus (Coronavirus) by PCR     Status: None    Collection Time: 06/29/21  5:36 PM    Specimen: Nasopharyngeal   Result Value Ref Range    SARS-CoV-2 Virus Specimen Source Nasopharyngeal     SARS-CoV-2 PCR Result NEGATIVE     SARS-CoV-2 PCR Comment (Note)    US Abdomen Complete w Doppler Complete      Status: None    Collection Time: 06/29/21  8:04 PM    Narrative    Exam: Complete abdominal ultrasound with Doppler 6/29/2021    History: Liver ultrasound with Doppler, GI bleed    Comparison: Ultrasound: 10/12/2019.    Findings: The liver demonstrates increased echogenicity and mildly  coarsened echotexture with nodular contour measuring 13.5 cm. No  evidence of focal mass or intrahepatic duct dilatation.     The gallbladder is surgically absent with the common bile duct  measuring 0.4 cm    The pancreas is partially obscured. The visualized portions of the  pancreas are unremarkable.     The right kidney measures 10.0 cm and demonstrates normal echotexture.  The left kidney measures 10.4 cm and demonstrates normal echotexture.  No hydronephrosis.    The spleen measures 8.3 cm and demonstrates normal echotexture and  echogenicity.    The aorta and IVC are patent and normal in caliber in the upper  abdomen.    There is significant ascites in all 4 quadrants.    Doppler:   Portal vein:  Main: 20 cm/s  There is no flow in the right and left portal veins which may be  secondary to slow flow versus thrombus.    Flow in the hepatic artery is towards the liver:  161 cm/sec peak systolic  40 cm/sec minimum diastolic flow   0.75 resistive index.    The hepatic venous system is patent with flow towards the IVC.  The mid splenic vein is patent with flow towards the liver.  The IVC is patent with flow towards the heart.      Impression    Impression:   1. Cirrhotic configuration of the liver. No focal hepatic mass.  2. Slow flow seen in the main portal vein with absent flow in the  right and left portal veins, which also may be secondary to slow flow  versus thrombus.    I have personally reviewed the examination and initial interpretation  and I agree with the findings.    ALLEN DAILEY MD   Hemoglobin     Status: Abnormal    Collection Time: 06/30/21  5:52 AM   Result Value Ref Range    Hemoglobin 8.0 (L) 13.3 - 17.7 g/dL        ED MEDICATIONS:   Medications   pantoprazole (PROTONIX) 80 mg in sodium chloride 0.9 % 100 mL infusion (8 mg/hr Intravenous Rate/Dose Verify 6/30/21 0551)   octreotide (sandoSTATIN) 1,250 mcg in sodium chloride 0.9 % 250 mL (50 mcg/hr Intravenous Rate/Dose Verify 6/30/21 0551)   LORazepam (ATIVAN) tablet 1-4 mg (has no administration in time range)   sodium chloride 0.9 % infusion (0 mLs  Stopped 6/30/21 0002)   cefTRIAXone (ROCEPHIN) 1 g vial to attach to  mL bag for ADULTS or NS 50 mL bag for PEDS (0 g Intravenous Stopped 6/29/21 2000)   sodium chloride 0.9 % infusion (0 mLs  Stopped 6/29/21 2010)   sodium chloride 0.9 % infusion (  Stopped 6/30/21 0002)         Impression:    ICD-10-CM    1. Shortness of breath  R06.02 ABO/Rh type and screen     Asymptomatic SARS-CoV-2 COVID-19 Virus (Coronavirus) by PCR     Blood component     Blood component     Blood component     Blood component     Hemoglobin   2. Alcoholic cirrhosis of liver with ascites (H)  K70.31    3. Anemia due to blood loss, acute  D62    4. Gastrointestinal hemorrhage with melena  K92.1        Plan:    Pending studies include none.  Patient is boarding the emergency department awaiting bed placement on the [facility] and is currently stable..    Addendum:  GI reviewed the chart and history.  They recommend discontinuation of octreotide, IV PPI, and Rocephin and outpatient follow-up for endoscopy as the patient does not have any overt bleeding and is stabilized after blood transfusion.  He has no signs of active bleeding and never noted any bleeding only occasional black stools.  He does have chronic alcoholic liver disease but states he has cut down substantially on his drinking, and has not required any treatment for withdrawal in the last 12 hours.  Discussed the ultrasound findings with GI, no significant acute abnormality on ultrasound to mandate further acute work-up.  This was discussed with the patient, who would prefer to go home  and currently feels back to baseline.  He agrees to start diuretics and follow-up closely with the GI clinic.  They are in the process of making arrangements for that follow-up.  We discussed the indications for emergency department return and follow-up.  Stable for discharge.    MD Kimber Beaulieu David, MD  06/30/21 0883       Gerard Quiroga MD  06/30/21 7836

## 2021-06-30 NOTE — ED NOTES
Handoff report given to GERMANIA Rasheed.  Informed of course of ED stay and plan of care.  Wili verbalized understanding.

## 2021-06-30 NOTE — ED NOTES
[facility]   ED Nurse to Floor Handoff     Gilles Malloy is a 61 year old male who speaks English and lives alone,  in a home  They arrived in the ED by car from home    ED Chief Complaint: Shortness of Breath (Reports hx of ascites, pt reports he has felt this way before, gets SOB upon exertion. ) and Flank Pain (Reports bilateral flank pain, has had similar issues since 2013, reports gallbladder removed. )    ED Dx;   Final diagnoses:   Shortness of breath   Alcoholic cirrhosis of liver with ascites (H)   Anemia due to blood loss, acute   Gastrointestinal hemorrhage with melena         Needed?: No    Allergies:   Allergies   Allergen Reactions     Penicillins      Sulfa Drugs    .  Past Medical Hx:   Past Medical History:   Diagnosis Date     Alcoholic liver disease (H) 2010    Pt has cut down on alcoholic intake and has not needed a paracentesis since 2016 or 2017     Alcoholism (H)      Breast cancer (H) 11/2018    Lt sided      Baseline Mental status: WDL  Current Mental Status changes: at basesline    Infection present or suspected this encounter: no  Sepsis suspected: No  Isolation type: No active isolations  Patient tested for COVID 19 prior to admission: YES     Activity level - Baseline/Home:  Independent  Activity Level - Current:   Independent    Bariatric equipment needed?: No    In the ED these meds were given:   Medications   pantoprazole (PROTONIX) 80 mg in sodium chloride 0.9 % 100 mL infusion (8 mg/hr Intravenous New Bag 6/29/21 1812)   octreotide (sandoSTATIN) 1,250 mcg in sodium chloride 0.9 % 250 mL (50 mcg/hr Intravenous New Bag 6/29/21 2051)   LORazepam (ATIVAN) tablet 1-4 mg (has no administration in time range)   sodium chloride 0.9 % infusion (1,000 mLs  New Bag 6/29/21 1814)   cefTRIAXone (ROCEPHIN) 1 g vial to attach to  mL bag for ADULTS or NS 50 mL bag for PEDS (0 g Intravenous Stopped 6/29/21 2000)   sodium chloride  0.9 % infusion (1,000 mLs  New Bag 6/29/21 1859)       Drips running?  Yes    Home pump  No    Current LDAs  Peripheral IV 06/29/21 Left Upper forearm (Active)   Site Assessment WDL 06/29/21 1557   Line Status Saline locked 06/29/21 1557   Dressing Intervention New dressing  06/29/21 1557   Phlebitis Scale 0-->no symptoms 06/29/21 1557   Number of days: 0       Peripheral IV 06/29/21 Right Hand (Active)   Site Assessment WDL 06/29/21 1855   Line Status Saline locked 06/29/21 1855   Dressing Intervention New dressing  06/29/21 1855   Phlebitis Scale 0-->no symptoms 06/29/21 1855   Number of days: 0       Labs results:   Labs Ordered and Resulted from Time of ED Arrival Up to the Time of Departure from the ED   CBC WITH PLATELETS DIFFERENTIAL - Abnormal; Notable for the following components:       Result Value    RBC Count 1.98 (*)     Hemoglobin 6.4 (*)     Hematocrit 20.5 (*)      (*)     MCHC 31.2 (*)     RDW 17.2 (*)     Absolute Lymphocytes 0.7 (*)     All other components within normal limits   COMPREHENSIVE METABOLIC PANEL - Abnormal; Notable for the following components:    Chloride 110 (*)     Calcium 8.1 (*)     Albumin 2.3 (*)     AST 50 (*)     All other components within normal limits   INR - Abnormal; Notable for the following components:    INR 1.47 (*)     All other components within normal limits   SARS-COV-2 (COVID-19) VIRUS RT-PCR   MSSA SCORE AND VS   NOTIFY   ABO/RH TYPE AND SCREEN   PREPARE RED BLOOD CELL ORDER UNIT   BLOOD COMPONENT   BLOOD COMPONENT       Imaging Studies:   Recent Results (from the past 24 hour(s))   POC US ABDOMEN LIMITED    Impression    Limited Bedside Abdominal Ultrasound, performed and interpreted by me.     Indication: Abdominal swelling. Evaluate for Free fluid.     With the patient in Trendelenburg, the RUQ, LUQ, (including the paracolic gutters) views were examined for free fluid. With the patient in reverse Trendelenburg, the suprapubic view was examined for  free fluid.     Findings: Free fluid present in both RUQ and LUQ    IMPRESSION: Free fluid present as noted above.     XR Chest 2 Views    Narrative    CHEST TWO VIEWS 6/29/2021 3:18 PM     HISTORY: Shortness of breath.    COMPARISON: None.    FINDINGS: Linear opacity at the left lung base likely scarring or  subsegmental atelectasis. The lungs are otherwise clear. No  pneumothorax or pleural effusion. Surgical clips noted in the left  axilla.       Impression    IMPRESSION: No radiographic evidence of acute chest abnormality.     AGUS PUGA MD   US Abdomen Complete w Doppler Complete    Narrative    Exam: Complete abdominal ultrasound with Doppler 6/29/2021    History: Liver ultrasound with Doppler, GI bleed    Comparison: Ultrasound: 10/12/2019.    Findings: The liver demonstrates increased echogenicity and mildly  coarsened echotexture with nodular contour measuring 13.5 cm. No  evidence of focal mass or intrahepatic duct dilatation.     The gallbladder is surgically absent with the common bile duct  measuring 0.4 cm    The pancreas is partially obscured. The visualized portions of the  pancreas are unremarkable.     The right kidney measures 10.0 cm and demonstrates normal echotexture.  The left kidney measures 10.4 cm and demonstrates normal echotexture.  No hydronephrosis.    The spleen measures 8.3 cm and demonstrates normal echotexture and  echogenicity.    The aorta and IVC are patent and normal in caliber in the upper  abdomen.    There is significant ascites in all 4 quadrants.    Doppler:   Portal vein:  Main: 20 cm/s  There is no flow in the right and left portal veins which may be  secondary to slow flow versus thrombus.    Flow in the hepatic artery is towards the liver:  161 cm/sec peak systolic  40 cm/sec minimum diastolic flow   0.75 resistive index.    The hepatic venous system is patent with flow towards the IVC.  The mid splenic vein is patent with flow towards the liver.  The IVC is  patent with flow towards the heart.      Impression    Impression:   1. Cirrhotic configuration of the liver. No focal hepatic mass.  2. Slow flow seen in the main portal vein with absent flow in the  right and left portal veins, which also may be secondary to slow flow  versus thrombus.    I have personally reviewed the examination and initial interpretation  and I agree with the findings.    ALLEN DAILEY MD       Recent vital signs:   /62   Pulse 85   Temp 98.9  F (37.2  C)   Resp 16   SpO2 100%     [place] Coma Scale Score: 15 (06/29/21 1428)       Cardiac Rhythm: Normal Sinus  Pt needs tele? No  Skin/wound Issues: None    Code Status: Full Code    Pain control: good    Nausea control: pt had none    Abnormal labs/tests/findings requiring intervention: See EMR    Family present during ED course? No   Family Comments/Social Situation comments: N/A    Tasks needing completion: None    Adrianna Hernadez, RN  [phone] [facility]  [phone] [facility] ED

## 2021-06-30 NOTE — DISCHARGE INSTRUCTIONS
Please make an appointment to follow up with GI Clinic (phone: [phone]) as soon as possible.  GI clinic will contact you to make arrangements.  Start Lasix and low-sodium diet.  Avoid alcohol.

## 2021-06-30 NOTE — ED NOTES
[facility]   ED Nurse to Floor Handoff     Gilles Malloy is a 61 year old male who speaks English and lives with others,  in a home  They arrived in the ED by car from home    ED Chief Complaint: Shortness of Breath (Reports hx of ascites, pt reports he has felt this way before, gets SOB upon exertion. ) and Flank Pain (Reports bilateral flank pain, has had similar issues since 2013, reports gallbladder removed. )    ED Dx;   Final diagnoses:   Shortness of breath   Alcoholic cirrhosis of liver with ascites (H)   Anemia due to blood loss, acute   Gastrointestinal hemorrhage with melena         Needed?: No    Allergies:   Allergies   Allergen Reactions     Penicillins      Sulfa Drugs    .  Past Medical Hx:   Past Medical History:   Diagnosis Date     Alcoholic liver disease (H) 2010    Pt has cut down on alcoholic intake and has not needed a paracentesis since 2016 or 2017     Alcoholism (H)      Breast cancer (H) 11/2018    Lt sided      Baseline Mental status: WDL  Current Mental Status changes: at basesline    Infection present or suspected this encounter: no  Sepsis suspected: No  Isolation type: No active isolations  Patient tested for COVID 19 prior to admission: Pending    Activity level - Baseline/Home:  Independent  Activity Level - Current:   Stand with Assist    Bariatric equipment needed?: No    In the ED these meds were given:   Medications   pantoprazole (PROTONIX) 80 mg in sodium chloride 0.9 % 100 mL infusion (8 mg/hr Intravenous Rate/Dose Verify 6/30/21 0551)   octreotide (sandoSTATIN) 1,250 mcg in sodium chloride 0.9 % 250 mL (50 mcg/hr Intravenous Rate/Dose Verify 6/30/21 0551)   LORazepam (ATIVAN) tablet 1-4 mg (has no administration in time range)   sodium chloride 0.9 % infusion (0 mLs  Stopped 6/30/21 0002)   cefTRIAXone (ROCEPHIN) 1 g vial to attach to  mL bag for ADULTS or NS 50 mL bag for PEDS (0 g Intravenous Stopped  6/29/21 2000)   sodium chloride 0.9 % infusion (0 mLs  Stopped 6/29/21 2010)   sodium chloride 0.9 % infusion (  Stopped 6/30/21 0002)       Drips running?  Yes    Home pump  No    Current LDAs  Peripheral IV 06/29/21 Left Upper forearm (Active)   Site Assessment [facility] 06/29/21 1557   Line Status Saline locked 06/29/21 1557   Dressing Intervention New dressing  06/29/21 1557   Phlebitis Scale 0-->no symptoms 06/29/21 1557   Number of days: 1       Peripheral IV 06/29/21 Right Hand (Active)   Site Assessment [facility] 06/29/21 1855   Line Status Saline locked 06/29/21 1855   Dressing Intervention New dressing  06/29/21 1855   Phlebitis Scale 0-->no symptoms 06/29/21 1855   Number of days: 1       Labs results:   Labs Ordered and Resulted from Time of ED Arrival Up to the Time of Departure from the ED   CBC WITH PLATELETS DIFFERENTIAL - Abnormal; Notable for the following components:       Result Value    RBC Count 1.98 (*)     Hemoglobin 6.4 (*)     Hematocrit 20.5 (*)      (*)     MCHC 31.2 (*)     RDW 17.2 (*)     Absolute Lymphocytes 0.7 (*)     All other components within normal limits   COMPREHENSIVE METABOLIC PANEL - Abnormal; Notable for the following components:    Chloride 110 (*)     Calcium 8.1 (*)     Albumin 2.3 (*)     AST 50 (*)     All other components within normal limits   INR - Abnormal; Notable for the following components:    INR 1.47 (*)     All other components within normal limits   HEMOGLOBIN - Abnormal; Notable for the following components:    Hemoglobin 8.0 (*)     All other components within normal limits   SARS-COV-2 (COVID-19) VIRUS RT-PCR   MSSA SCORE AND VS   NOTIFY   ABO/RH TYPE AND SCREEN   PREPARE RED BLOOD CELL ORDER UNIT   BLOOD COMPONENT   BLOOD COMPONENT       Imaging Studies:   Recent Results (from the past 24 hour(s))   POC US ABDOMEN LIMITED    Impression    Limited Bedside Abdominal Ultrasound, performed and interpreted by me.     Indication: Abdominal swelling. Evaluate for  Free fluid.     With the patient in Trendelenburg, the RUQ, LUQ, (including the paracolic gutters) views were examined for free fluid. With the patient in reverse Trendelenburg, the suprapubic view was examined for free fluid.     Findings: Free fluid present in both RUQ and LUQ    IMPRESSION: Free fluid present as noted above.     XR Chest 2 Views    Narrative    CHEST TWO VIEWS 6/29/2021 3:18 PM     HISTORY: Shortness of breath.    COMPARISON: None.    FINDINGS: Linear opacity at the left lung base likely scarring or  subsegmental atelectasis. The lungs are otherwise clear. No  pneumothorax or pleural effusion. Surgical clips noted in the left  axilla.       Impression    IMPRESSION: No radiographic evidence of acute chest abnormality.     AGUS PUGA MD   US Abdomen Complete w Doppler Complete    Narrative    Exam: Complete abdominal ultrasound with Doppler 6/29/2021    History: Liver ultrasound with Doppler, GI bleed    Comparison: Ultrasound: 10/12/2019.    Findings: The liver demonstrates increased echogenicity and mildly  coarsened echotexture with nodular contour measuring 13.5 cm. No  evidence of focal mass or intrahepatic duct dilatation.     The gallbladder is surgically absent with the common bile duct  measuring 0.4 cm    The pancreas is partially obscured. The visualized portions of the  pancreas are unremarkable.     The right kidney measures 10.0 cm and demonstrates normal echotexture.  The left kidney measures 10.4 cm and demonstrates normal echotexture.  No hydronephrosis.    The spleen measures 8.3 cm and demonstrates normal echotexture and  echogenicity.    The aorta and IVC are patent and normal in caliber in the upper  abdomen.    There is significant ascites in all 4 quadrants.    Doppler:   Portal vein:  Main: 20 cm/s  There is no flow in the right and left portal veins which may be  secondary to slow flow versus thrombus.    Flow in the hepatic artery is towards the liver:  161 cm/sec  peak systolic  40 cm/sec minimum diastolic flow   0.75 resistive index.    The hepatic venous system is patent with flow towards the IVC.  The mid splenic vein is patent with flow towards the liver.  The IVC is patent with flow towards the heart.      Impression    Impression:   1. Cirrhotic configuration of the liver. No focal hepatic mass.  2. Slow flow seen in the main portal vein with absent flow in the  right and left portal veins, which also may be secondary to slow flow  versus thrombus.    I have personally reviewed the examination and initial interpretation  and I agree with the findings.    ALLEN DAILEY MD       Recent vital signs:   /74   Pulse 74   Temp 97.4  F (36.3  C) (Oral)   Resp 17   SpO2 98%     Allan Coma Scale Score: 15 (06/30/21 0235)       Cardiac Rhythm: Normal Sinus  Pt needs tele? Yes  Skin/wound Issues: None    Code Status: Full Code    Pain control: pt had none    Nausea control: pt had none    Abnormal labs/tests/findings requiring intervention: Hgb on 6.4    Family present during ED course? No   Family Comments/Social Situation comments:     Tasks needing completion: None    Zach Lundberg RN  asc --   [phone] [place] [facility]  [phone] [facility] ED

## 2021-06-30 NOTE — TELEPHONE ENCOUNTER
1st schedule attempt    Procedure: Lower Endoscopy     Upper Endoscopy    Upper Endoscopy Type: EGD    Upper Endoscopy Sedation: Conscious/Moderate    Upper Endoscopy Reason for Procedure: Anemia of 6.2 in th setting of alcohol use.    Lower Endoscopy Type: Colonoscopy    Purpose of Colonoscopy Procedure: Screening    Colonoscopy Sedation: Conscious/Moderate    Preferred Location: [facility]    Scheduling Instructions: If you have not heard from the scheduling office within 2 business days, please call [phone].           Associated Diagnoses    Anemia, unspecified type [D64.9]  - Primary

## 2021-06-30 NOTE — ED NOTES
The patient was accepted at shift change signout with a plan to monitor in the ED until his inpatient bed became available.  I did recheck her hemoglobin, this looks improved now at her his blood transfusion.  He remains on the octreotide Protonix drips.  No other acute issues overnight.  Vital signs are stable.  He will be signed out at shift change while awaiting bed placement.    Dictation Disclaimer: Some of this Note has been completed with voice-recognition dictation software. Although errors are generally corrected real-time, there is the potential for a rare error to be present in the completed chart.       Mildred Kenyon MD  06/30/21 0606

## 2021-07-01 ENCOUNTER — TELEPHONE (OUTPATIENT)
Dept: GASTROENTEROLOGY | Facility: CLINIC | Age: 61
End: 2021-07-01

## 2021-07-01 ENCOUNTER — TELEPHONE (OUTPATIENT)
Dept: GASTROENTEROLOGY | Facility: OUTPATIENT CENTER | Age: 61
End: 2021-07-01

## 2021-07-01 DIAGNOSIS — Z11.59 ENCOUNTER FOR SCREENING FOR OTHER VIRAL DISEASES: ICD-10-CM

## 2021-07-01 NOTE — TELEPHONE ENCOUNTER
Patient scheduled for colonoscopy/EGD on 7/8/21 at [facility] under conscious sedation.     Upon review patient with history of chemical dependency and chronic narcotic use. Recommendations per exclusion criteria is for MAC sedation.     Will send to endoscopy scheduling to facilitate sedation change.     Anna Batres RN

## 2021-07-01 NOTE — TELEPHONE ENCOUNTER
2nd schedule attempt     Procedure: Lower Endoscopy   Upper Endoscopy   Upper Endoscopy Type: EGD   Upper Endoscopy Sedation: Conscious/Moderate   Upper Endoscopy Reason for Procedure: Anemia of 6.2 in th setting of alcohol use.   Lower Endoscopy Type: Colonoscopy   Purpose of Colonoscopy Procedure: Screening   Colonoscopy Sedation: Conscious/Moderate   Preferred Location: [facility]   Scheduling Instructions: If you have not heard from the scheduling office within 2 business days, please call [phone].         Associated Diagnoses     Anemia, unspecified type [D64.9]  - Primary

## 2021-07-01 NOTE — TELEPHONE ENCOUNTER
Screening Questions  1. Are you active on mychart?  NO    2. What insurance is in the chart?  [facility]    2.  Ordering/Referring Provider: DR NIA BOOGIE    3. BMI 25.1    4. Are you on daily home oxygen? NO    5. Do you have a history of difficult airway? NO    6. Have you had a heart, lung, or liver transplant? NO    7. Are you currently on dialysis? NO    8. Have you had a stroke or Transient ischemic atttack (TIA) within 6 months? NO    9. In the past 6 months, have you had any heart related issues including cardiomyopathy or heart attack?         If yes, did it require cardiac stenting or other implantable device?NO    10. Do you have any implantable devices in your body (pacemaker, defib, LVAD)? NO    11. Do you take nitroglycerin? If yes, how often? NO    12. Are you currently taking any blood thinners?NO    13. Are you a diabetic? NO    14. (Females) Are you currently pregnant? NO  If yes, how many weeks?    15. Have you had a procedure in the past that was difficult to tolerate with conscious sedation? Any allergies to Fentanyl or Versed NO    16. Are you taking any scheduled prescription narcotics more than once daily? YES    17. Do you have any chemical dependencies such as alcohol, street drugs, or methadone? YES, DRINKS ALCOHOL DAILY    18. Do you have any history of post-traumatic stress syndrome or mental health issues? NO    19. Do you transfer independently? YES    20.  Do you have any issues with constipation? NO    21. Preferred Pharmacy for Pre Prescription XX    Scheduling Details    Procedure Scheduled: UPPER AND LOWER ENDOSCOPY  Provider/Surgeon: EUN  Date of Procedure: 7/8  Location: [facility]  Caller (Please ask for phone number if not scheduled by patient): SELF      Sedation Type: CS  Conscious Sedation- Needs  for 6 hours after the procedure  MAC/General-Needs  for 24 hours after procedure    Pre-op Required at [facility] [place], and OR for MAC sedation:    (if yes advise patient they will need a pre-op prior to procedure)      Is patient on blood thinners? -NO (If yes- inform patient to follow up with PCP or provider for follow up instructions)     Informed patient they will need an adult  YES, HE WAS ALSO INFORMED NO UBERS TO TAKE HIM HOME, UNLESS WITH ANOTHER PERSON  Cannot take any type of public or medical transportation alone    Informed Patient of COVID Test Requirement YES, HE WILL CALL TO SCHEDULE    Confirmed Nurse will call to complete assessment YES    Additional comments: NONE

## 2021-07-02 ENCOUNTER — TELEPHONE (OUTPATIENT)
Dept: GASTROENTEROLOGY | Facility: CLINIC | Age: 61
End: 2021-07-02

## 2021-07-02 NOTE — TELEPHONE ENCOUNTER
Caller: Gilles    Procedure: EGD and Colonoscopy    Date of Procedure Cancelled: 7/8/21    Ordering Provider:Dalton Aggarwal MD    Reason for cancel: Patient needed to be rescheduled under MAC due to patient having alcohol dependency and is on methadone.      Rescheduled: Yes     If rescheduled    Date: 7/15/21    Location: [facility]    Note any change or update to original order/sedation: MAC

## 2021-07-02 NOTE — TELEPHONE ENCOUNTER
Patient has been rescheduled to 7/15/21 with Dr. Merrill with MAC.    Thank you,  Clarissa Covington-Nae  [facility] Scheduling Team

## 2021-07-08 ENCOUNTER — TELEPHONE (OUTPATIENT)
Dept: GASTROENTEROLOGY | Facility: CLINIC | Age: 61
End: 2021-07-08

## 2021-07-08 NOTE — TELEPHONE ENCOUNTER
Pre assessment questions completed for upcoming colonoscopy and EGD procedure scheduled on 7.15.2021    COVID test scheduled 7.11.2021 at [facility]    Pre-op scheduled 7.13.2021 with PCP Dr. Olsen    Pt has [facility] fax number to send results    Reviewed miralax and magnesium citrate prep instructions with patient.     Procedural arrival time and facility location reviewed.    Designated  policy reviewed.    Pt requests miralax and magnesium citrate prep instructions via unencrypted e-mail. Pt acknowledges that they have agreed to accept the risks and receive unencrypted communications for this incidence.     Patient verbalized understanding and had no questions or concerns at this time.    Alisa High RN

## 2021-07-14 ENCOUNTER — TELEPHONE (OUTPATIENT)
Dept: GASTROENTEROLOGY | Facility: CLINIC | Age: 61
End: 2021-07-14

## 2021-07-14 NOTE — TELEPHONE ENCOUNTER
Called patient to confirm EGD/colon tomorrow.  Pt currently scheduled for 8am check-in, however this writer would like patient to check in at 7am for 8:30am procedure.  Unable to reach patient to discuss, LVM for callback.

## 2021-07-15 ENCOUNTER — ANESTHESIA (OUTPATIENT)
Dept: GASTROENTEROLOGY | Facility: CLINIC | Age: 61
End: 2021-07-15
Payer: COMMERCIAL

## 2021-07-15 ENCOUNTER — ANESTHESIA EVENT (OUTPATIENT)
Dept: GASTROENTEROLOGY | Facility: CLINIC | Age: 61
End: 2021-07-15
Payer: COMMERCIAL

## 2021-07-15 ENCOUNTER — HOSPITAL ENCOUNTER (OUTPATIENT)
Facility: CLINIC | Age: 61
Discharge: HOME | End: 2021-07-15
Attending: INTERNAL MEDICINE | Admitting: INTERNAL MEDICINE
Payer: COMMERCIAL

## 2021-07-15 ENCOUNTER — TELEPHONE (OUTPATIENT)
Dept: GASTROENTEROLOGY | Facility: OUTPATIENT CENTER | Age: 61
End: 2021-07-15

## 2021-07-15 VITALS
TEMPERATURE: 98.6 F | BODY MASS INDEX: 26.06 KG/M2 | HEIGHT: 67 IN | RESPIRATION RATE: 18 BRPM | SYSTOLIC BLOOD PRESSURE: 130 MMHG | WEIGHT: 166.01 LBS | HEART RATE: 86 BPM | DIASTOLIC BLOOD PRESSURE: 78 MMHG | OXYGEN SATURATION: 99 %

## 2021-07-15 DIAGNOSIS — K70.31 ALCOHOLIC CIRRHOSIS OF LIVER WITH ASCITES (H): Primary | ICD-10-CM

## 2021-07-15 LAB
COLONOSCOPY: NORMAL
UPPER GI ENDOSCOPY: NORMAL

## 2021-07-15 PROCEDURE — 45380 COLONOSCOPY AND BIOPSY: CPT | Performed by: INTERNAL MEDICINE

## 2021-07-15 PROCEDURE — 250N000011 HC RX IP 250 OP 636: Performed by: NURSE ANESTHETIST, CERTIFIED REGISTERED

## 2021-07-15 PROCEDURE — 250N000009 HC RX 250: Performed by: NURSE ANESTHETIST, CERTIFIED REGISTERED

## 2021-07-15 PROCEDURE — 45385 COLONOSCOPY W/LESION REMOVAL: CPT | Performed by: INTERNAL MEDICINE

## 2021-07-15 PROCEDURE — 370N000017 HC ANESTHESIA TECHNICAL FEE, PER MIN: Performed by: INTERNAL MEDICINE

## 2021-07-15 PROCEDURE — 258N000003 HC RX IP 258 OP 636: Performed by: NURSE ANESTHETIST, CERTIFIED REGISTERED

## 2021-07-15 PROCEDURE — 43244 EGD VARICES LIGATION: CPT | Performed by: INTERNAL MEDICINE

## 2021-07-15 PROCEDURE — 250N000013 HC RX MED GY IP 250 OP 250 PS 637: Performed by: INTERNAL MEDICINE

## 2021-07-15 PROCEDURE — 88305 TISSUE EXAM BY PATHOLOGIST: CPT | Mod: TC | Performed by: INTERNAL MEDICINE

## 2021-07-15 RX ORDER — LIDOCAINE 40 MG/G
CREAM TOPICAL
Status: DISCONTINUED | OUTPATIENT
Start: 2021-07-15 | End: 2021-07-15 | Stop reason: HOSPADM

## 2021-07-15 RX ORDER — SODIUM CHLORIDE, SODIUM LACTATE, POTASSIUM CHLORIDE, CALCIUM CHLORIDE 600; 310; 30; 20 MG/100ML; MG/100ML; MG/100ML; MG/100ML
INJECTION, SOLUTION INTRAVENOUS CONTINUOUS PRN
Status: DISCONTINUED | OUTPATIENT
Start: 2021-07-15 | End: 2021-07-15

## 2021-07-15 RX ORDER — NALOXONE HYDROCHLORIDE 0.4 MG/ML
0.4 INJECTION, SOLUTION INTRAMUSCULAR; INTRAVENOUS; SUBCUTANEOUS
Status: DISCONTINUED | OUTPATIENT
Start: 2021-07-15 | End: 2021-07-15 | Stop reason: HOSPADM

## 2021-07-15 RX ORDER — NALOXONE HYDROCHLORIDE 0.4 MG/ML
0.2 INJECTION, SOLUTION INTRAMUSCULAR; INTRAVENOUS; SUBCUTANEOUS
Status: DISCONTINUED | OUTPATIENT
Start: 2021-07-15 | End: 2021-07-15 | Stop reason: HOSPADM

## 2021-07-15 RX ORDER — PROCHLORPERAZINE MALEATE 10 MG
10 TABLET ORAL EVERY 6 HOURS PRN
Status: DISCONTINUED | OUTPATIENT
Start: 2021-07-15 | End: 2021-07-15 | Stop reason: HOSPADM

## 2021-07-15 RX ORDER — ONDANSETRON 2 MG/ML
4 INJECTION INTRAMUSCULAR; INTRAVENOUS EVERY 6 HOURS PRN
Status: DISCONTINUED | OUTPATIENT
Start: 2021-07-15 | End: 2021-07-15 | Stop reason: HOSPADM

## 2021-07-15 RX ORDER — FLUMAZENIL 0.1 MG/ML
0.2 INJECTION, SOLUTION INTRAVENOUS
Status: DISCONTINUED | OUTPATIENT
Start: 2021-07-15 | End: 2021-07-15 | Stop reason: HOSPADM

## 2021-07-15 RX ORDER — ONDANSETRON 4 MG/1
4 TABLET, ORALLY DISINTEGRATING ORAL EVERY 6 HOURS PRN
Status: DISCONTINUED | OUTPATIENT
Start: 2021-07-15 | End: 2021-07-15 | Stop reason: HOSPADM

## 2021-07-15 RX ORDER — PROPOFOL 10 MG/ML
INJECTION, EMULSION INTRAVENOUS PRN
Status: DISCONTINUED | OUTPATIENT
Start: 2021-07-15 | End: 2021-07-15

## 2021-07-15 RX ORDER — SIMETHICONE 40MG/0.6ML
SUSPENSION, DROPS(FINAL DOSAGE FORM)(ML) ORAL PRN
Status: COMPLETED | OUTPATIENT
Start: 2021-07-15 | End: 2021-07-15

## 2021-07-15 RX ORDER — ONDANSETRON 2 MG/ML
4 INJECTION INTRAMUSCULAR; INTRAVENOUS
Status: DISCONTINUED | OUTPATIENT
Start: 2021-07-15 | End: 2021-07-15 | Stop reason: HOSPADM

## 2021-07-15 RX ORDER — LIDOCAINE HYDROCHLORIDE 20 MG/ML
INJECTION, SOLUTION INFILTRATION; PERINEURAL PRN
Status: DISCONTINUED | OUTPATIENT
Start: 2021-07-15 | End: 2021-07-15

## 2021-07-15 RX ADMIN — PROPOFOL 40 MG: 10 INJECTION, EMULSION INTRAVENOUS at 11:52

## 2021-07-15 RX ADMIN — LIDOCAINE HYDROCHLORIDE 100 MG: 20 INJECTION, SOLUTION INFILTRATION; PERINEURAL at 11:34

## 2021-07-15 RX ADMIN — LIDOCAINE HYDROCHLORIDE 50 MG: 20 INJECTION, SOLUTION INFILTRATION; PERINEURAL at 11:35

## 2021-07-15 RX ADMIN — PROPOFOL 150 MG: 10 INJECTION, EMULSION INTRAVENOUS at 11:36

## 2021-07-15 RX ADMIN — SIMETHICONE 133 MG: 63.3; 3.7 SOLUTION/ DROPS ORAL at 11:20

## 2021-07-15 RX ADMIN — PROPOFOL 50 MG: 10 INJECTION, EMULSION INTRAVENOUS at 11:41

## 2021-07-15 RX ADMIN — SODIUM CHLORIDE, POTASSIUM CHLORIDE, SODIUM LACTATE AND CALCIUM CHLORIDE: 600; 310; 30; 20 INJECTION, SOLUTION INTRAVENOUS at 11:27

## 2021-07-15 RX ADMIN — PROPOFOL 40 MG: 10 INJECTION, EMULSION INTRAVENOUS at 11:45

## 2021-07-15 RX ADMIN — PROPOFOL 50 MG: 10 INJECTION, EMULSION INTRAVENOUS at 12:06

## 2021-07-15 RX ADMIN — PROPOFOL 40 MG: 10 INJECTION, EMULSION INTRAVENOUS at 12:13

## 2021-07-15 RX ADMIN — PROPOFOL 40 MG: 10 INJECTION, EMULSION INTRAVENOUS at 11:48

## 2021-07-15 RX ADMIN — PROPOFOL 60 MG: 10 INJECTION, EMULSION INTRAVENOUS at 11:57

## 2021-07-15 ASSESSMENT — MIFFLIN-ST. JEOR: SCORE: 1506.63

## 2021-07-15 ASSESSMENT — LIFESTYLE VARIABLES: TOBACCO_USE: 1

## 2021-07-15 NOTE — LETTER
July 17, 2021      Gilles Malloy  [address] [place] [facility] [address]        Dear ,    The pathology results returned from the polyps removed at your recent colonoscopy.    The polyps were pre-cancerous but showed no active evidence of cancer.      Due to the microscopic appearance of the polyp, you will require closer monitoring in the future.    Current guidelines recommend that you undergo a follow-up colonoscopy in 3-5 years.    Sincerely,               Matias Merrill MD   [facility] [place], ENDOSCOPY  [address] [place] [address]  Phone: [phone]

## 2021-07-15 NOTE — ANESTHESIA POSTPROCEDURE EVALUATION
Patient: Gilles Malloy    Procedure(s):  Esophagogastroduodenoscopy, With Banding Of Varices  Colonoscopy, With Polypectomy And Biopsy    Diagnosis:Anemia, unspecified type [D64.9]  Diagnosis Additional Information: No value filed.    Anesthesia Type:  MAC    Note:  Disposition: Outpatient   Postop Pain Control: Uneventful            Sign Out: Well controlled pain   PONV: No   Neuro/Psych: Uneventful            Sign Out: Acceptable/Baseline neuro status   Airway/Respiratory: Uneventful            Sign Out: Acceptable/Baseline resp. status   CV/Hemodynamics: Uneventful            Sign Out: Acceptable CV status   Other NRE: NONE   DID A NON-ROUTINE EVENT OCCUR? No           Last vitals:  Vitals:    07/15/21 0928   BP: 125/81   Pulse: 86   Resp: 18   Temp: 37.1  C (98.8  F)   SpO2: 100%       Last vitals prior to Anesthesia Care Transfer:  CRNA VITALS  7/15/2021 1149 - 7/15/2021 1249      7/15/2021             Resp Rate (observed):  (!) 0          Electronically Signed By: Kimberly Anderson MD  July 15, 2021  12:55 PM

## 2021-07-15 NOTE — DISCHARGE INSTRUCTIONS
[facility]  Same-Day Surgery   Adult Discharge Orders & Instructions     For 24 hours after surgery    1. Get plenty of rest.  A responsible adult must stay with you for at least 24 hours after you leave the hospital.   2. Do not drive or use heavy equipment.  If you have weakness or tingling, don't drive or use heavy equipment until this feeling goes away.  3. Do not drink alcohol.  4. Avoid strenuous or risky activities.  Ask for help when climbing stairs.   5. You may feel lightheaded.  IF so, sit for a few minutes before standing.  Have someone help you get up.   6. If you have nausea (feel sick to your stomach): Drink only clear liquids such as apple juice, ginger ale, broth or 7-Up.  Rest may also help.  Be sure to drink enough fluids.  Move to a regular diet as you feel able.  7. You may have a slight fever. Call the doctor if your fever is over 100 F (37.7 C) (taken under the tongue) or lasts longer than 24 hours.  8. You may have a dry mouth, a sore throat, muscle aches or trouble sleeping.  These should go away after 24 hours.  9. Do not make important or legal decisions.   Call your doctor for any of the followin.  Signs of infection (fever, growing tenderness at the surgery site, a large amount of drainage or bleeding, severe pain, foul-smelling drainage, redness, swelling).    2. It has been over 8 to 10 hours since surgery and you are still not able to urinate (pass water).    3.  Headache for over 24 hours.    4.  Numbness, tingling or weakness the day after surgery (if you had spinal anesthesia).  To contact a doctor, call _____ [phone] [GI CLINIC]________ or:        [phone] and ask for the resident on call for   ______GI_______ (answered 24 hours a day)      Emergency Department:    [facility]: [phone]       (TTY for hearing impaired: [phone])

## 2021-07-15 NOTE — OR NURSING
EGD under MAC.  6 bands placed.  Pt tolerated procedure    Colonoscopy under MAC.  4 polyps removed with cold snare.  Pt tolerated procedure.

## 2021-07-15 NOTE — ANESTHESIA CARE TRANSFER NOTE
Patient: Gilles Malloy    Procedure(s):  Esophagogastroduodenoscopy, With Banding Of Varices  Colonoscopy, With Polypectomy And Biopsy    Diagnosis: Anemia, unspecified type [D64.9]  Diagnosis Additional Information: No value filed.    Anesthesia Type:   MAC     Note:      Level of Consciousness: awake      Independent Airway: airway patency satisfactory and stable  Dentition: dentition unchanged  Vital Signs Stable: post-procedure vital signs reviewed and stable  Report to RN Given: handoff report given  Patient transferred to: Phase II    Handoff Report: Identifed the Patient, Identified the Reponsible Provider, Reviewed the pertinent medical history, Discussed the surgical course, Reviewed Intra-OP anesthesia mangement and issues during anesthesia, Set expectations for post-procedure period and Allowed opportunity for questions and acknowledgement of understanding      Vitals: (Last set prior to Anesthesia Care Transfer)  CRNA VITALS  7/15/2021 1149 - 7/15/2021 1226      7/15/2021             Resp Rate (observed):  (!) 0        Electronically Signed By: CARMELITA Crooks CRNA  July 15, 2021  12:26 PM

## 2021-07-15 NOTE — TELEPHONE ENCOUNTER
Pt called stating he is running a bit late for his procedure but still wants to come as he is prepped and ready to go. I do not have direct number for unit, so I'm making this TE.

## 2021-07-15 NOTE — ANESTHESIA PREPROCEDURE EVALUATION
Anesthesia Pre-Procedure Evaluation    Patient: Gilles Malloy   MRN: [id number] : 1960        Preoperative Diagnosis: Anemia, unspecified type [D64.9]   Procedure : Procedure(s):  COLONOSCOPY  ESOPHAGOGASTRODUODENOSCOPY (EGD)     Past Medical History:   Diagnosis Date     Alcoholic liver disease (H)     Pt has cut down on alcoholic intake and has not needed a paracentesis since  or      Alcoholism (H)      Breast cancer (H) 2018    Lt sided      Past Surgical History:   Procedure Laterality Date     CHOLECYSTECTOMY       HERNIA REPAIR      x 2     MANDIBLE SURGERY      Dental jaw surgery 2008     MASTECTOMY Left 2018     RECONSTRUCT NOSE AND SEPTUM (FUNCTIONAL)        Allergies   Allergen Reactions     Penicillins      Sulfa Drugs       Social History     Tobacco Use     Smoking status: Current Every Day Smoker     Packs/day: 0.75     Smokeless tobacco: Never Used   Substance Use Topics     Alcohol use: Yes     Comment: daily 2-4 beers or couple glasses of wine      Wt Readings from Last 1 Encounters:   07/15/21 75.3 kg (166 lb 0.1 oz)        Anesthesia Evaluation            ROS/MED HX  ENT/Pulmonary:     (+) tobacco use, Current use,     Neurologic:  - neg neurologic ROS     Cardiovascular:  - neg cardiovascular ROS     METS/Exercise Tolerance:     Hematologic:     (+) anemia,     Musculoskeletal:       GI/Hepatic: Comment: GI bleed      Renal/Genitourinary:  - neg Renal ROS     Endo:  - neg endo ROS     Psychiatric/Substance Use:     (+) alcohol abuse     Infectious Disease:  - neg infectious disease ROS     Malignancy:   (+) Malignancy, History of Breast.    Other:            Physical Exam    Airway        Mallampati: II   TM distance: > 3 FB   Neck ROM: full   Mouth opening: > 3 cm    Respiratory Devices and Support         Dental     Comment: Poor dentition, black decay evident in all front teeth, denies loose        Cardiovascular   cardiovascular exam normal           Pulmonary   pulmonary exam normal                OUTSIDE LABS:  CBC:   Lab Results   Component Value Date    WBC 5.8 06/29/2021    WBC 4.6 05/11/2020    HGB 8.0 (L) 06/30/2021    HGB 6.4 (LL) 06/29/2021    HCT 20.5 (L) 06/29/2021    HCT 43.4 05/11/2020     06/29/2021     (L) 05/11/2020     BMP:   Lab Results   Component Value Date     06/29/2021     05/11/2020    POTASSIUM 4.1 06/29/2021    POTASSIUM 3.8 05/11/2020    CHLORIDE 110 (H) 06/29/2021    CHLORIDE 113 (H) 05/11/2020    CO2 23 06/29/2021    CO2 23 05/11/2020    BUN 14 06/29/2021    BUN 7 05/11/2020    CR 1.12 06/29/2021    CR 0.84 05/11/2020    GLC 87 06/29/2021    GLC 71 05/11/2020     COAGS:   Lab Results   Component Value Date    INR 1.47 (H) 06/29/2021     POC: No results found for: BGM, HCG, HCGS  HEPATIC:   Lab Results   Component Value Date    ALBUMIN 2.3 (L) 06/29/2021    PROTTOTAL 7.2 06/29/2021    ALT 31 06/29/2021    AST 50 (H) 06/29/2021    ALKPHOS 67 06/29/2021    BILITOTAL 1.3 06/29/2021     OTHER:   Lab Results   Component Value Date    LUCIA 8.1 (L) 06/29/2021       Anesthesia Plan    ASA Status:  3      Anesthesia Type: MAC.     - Reason for MAC: chronic cardiopulmonary disease   Induction: Propofol.           Consents    Anesthesia Plan(s) and associated risks, benefits, and realistic alternatives discussed. Questions answered and patient/representative(s) expressed understanding.     - Discussed with:  Patient         Postoperative Care       PONV prophylaxis: Background Propofol Infusion     Comments:                Kimberly Andreson MD

## 2021-07-16 ENCOUNTER — TELEPHONE (OUTPATIENT)
Dept: GASTROENTEROLOGY | Facility: CLINIC | Age: 61
End: 2021-07-16

## 2021-07-16 LAB
PATH REPORT.COMMENTS IMP SPEC: NORMAL
PATH REPORT.COMMENTS IMP SPEC: NORMAL
PATH REPORT.FINAL DX SPEC: NORMAL
PATH REPORT.GROSS SPEC: NORMAL
PATH REPORT.MICROSCOPIC SPEC OTHER STN: NORMAL
PATH REPORT.RELEVANT HX SPEC: NORMAL
PHOTO IMAGE: NORMAL

## 2021-07-16 PROCEDURE — 88305 TISSUE EXAM BY PATHOLOGIST: CPT | Mod: 26 | Performed by: PATHOLOGY

## 2021-07-16 NOTE — TELEPHONE ENCOUNTER
1st schedule attempt    Procedure: Upper Endoscopy    Upper Endoscopy Type: EGD    Upper Endoscopy Sedation: Deep/MAC    Upper Endoscopy Reason for Procedure: varices    Timeframe Requested: Routine: Next available opening    Priority: Routine    Preferred Location: [facility]    Scheduling Instructions: If you have not heard from the scheduling office within 2 business days, please call [phone].           Associated Diagnoses    Alcoholic cirrhosis of liver with ascites (H) [K70.31]  - Primary

## 2021-07-19 ENCOUNTER — HOSPITAL ENCOUNTER (OUTPATIENT)
Facility: AMBULATORY SURGERY CENTER | Age: 61
End: 2021-07-19
Attending: STUDENT IN AN ORGANIZED HEALTH CARE EDUCATION/TRAINING PROGRAM
Payer: COMMERCIAL

## 2021-07-22 ENCOUNTER — TELEPHONE (OUTPATIENT)
Dept: GASTROENTEROLOGY | Facility: CLINIC | Age: 61
End: 2021-07-22

## 2021-07-22 NOTE — TELEPHONE ENCOUNTER
[facility] Call Center    Phone Message    May a detailed message be left on voicemail: yes     Reason for Call: Other: Brannon calling to update Dr. Larson and care team. States that Dr. Larson was wanting a follow up to discuss upper endoscopy results. Patient states that he had one done on 7/15 and is having another one on 9/2. Wondering if Dr. Larson would like a follow up between the 2 or after both of them have been completed.     Please advise and call Brannon back at your earliest convenience to discuss     Action Taken: Other:  HEPATOLOGY    Travel Screening: Not Applicable

## 2021-07-26 NOTE — TELEPHONE ENCOUNTER
Pt scheduled by clinic coordinator with Dr. Larson on 8/18/21 to discuss EGD results.    Berenice ACOSTA LPN  Hepatology Clinic

## 2021-08-18 ENCOUNTER — VIRTUAL VISIT (OUTPATIENT)
Dept: GASTROENTEROLOGY | Facility: CLINIC | Age: 61
End: 2021-08-18
Attending: INTERNAL MEDICINE
Payer: COMMERCIAL

## 2021-08-18 DIAGNOSIS — K70.31 ALCOHOLIC CIRRHOSIS OF LIVER WITH ASCITES (H): Primary | ICD-10-CM

## 2021-08-18 PROCEDURE — 99214 OFFICE O/P EST MOD 30 MIN: CPT | Mod: GT | Performed by: INTERNAL MEDICINE

## 2021-08-18 RX ORDER — FUROSEMIDE 40 MG
80 TABLET ORAL DAILY
Qty: 90 TABLET | Refills: 3 | Status: ON HOLD | OUTPATIENT
Start: 2021-08-18 | End: 2022-08-03

## 2021-08-18 RX ORDER — SPIRONOLACTONE 50 MG/1
50 TABLET, FILM COATED ORAL DAILY
Qty: 90 TABLET | Refills: 3 | Status: ON HOLD | OUTPATIENT
Start: 2021-08-18 | End: 2022-08-03

## 2021-08-18 NOTE — PROGRESS NOTES
Gilles is a 61 year old who is being evaluated via a billable video visit.      How would you like to obtain your AVS? By mail  If the video visit is dropped, the invitation should be resent by: [phone]  Will anyone else be joining your video visit?     Video Start Time: 3:20 PM    Subjective   Gilles is a 61 year old who presents for the following health issues: alcoholic cirrhosis with ascites    HPI: I had the pleasure of seeing Brannon Malloy for followup in the Liver Clinic at the [facility] on August 18, 2021.  Mr. Malloy presents for followup of alcoholic cirrhosis.      Unfortunately, he has continued to drink but at least this time he does say he is trying to stop altogether.    He reports drinking about 4 beers per week.      He, nonetheless, is feeling fairly well.  He denies any abdominal pain, itching or skin rash or fatigue.  He does however report some increased abdominal girth and lower extremity edema.  He has not been on diuretics and has not particularly been limiting his salt intake     He denies any fevers or chills, cough or shortness of breath.  He denies any nausea or vomiting, diarrhea or constipation.  His appetite has been good, and his weight has largely remained the same.       The major problem he has had since he was last seen is that he developed upper GI bleeding.   His hemoglobin fell to 6 and he required several units transfusion.  He ultimately underwent both an upper GI endoscopy and colonoscopy and was found to have large esophageal varices which were banded.  He also had a few small polyps, which probably were not the source of the blood loss.  He is scheduled for a follow-up endoscopy in early September.    PMH:  Hx of breast Ca; 2018.  Surgical hx:  Mastectomy, cholecystectomy, hernia repair X2, nose reconstruction.    SH:  Still using alcohol    Current Outpatient Medications   Medication     ALPRAZolam (XANAX) 0.5 MG tablet      furosemide (LASIX) 40 MG tablet     HYDROcodone-acetaminophen (NORCO)  MG per tablet     multivitamin, therapeutic (THERA-VIT) TABS tablet     spironolactone (ALDACTONE) 50 MG tablet     tamoxifen (NOLVADEX) 20 MG tablet     No current facility-administered medications for this visit.     Objective      Vitals:  No vitals were obtained today due to virtual visit.    Physical Exam: GENERAL: Healthy, alert and no distress. EYES: Eyes grossly normal to inspection.  No discharge or erythema, or obvious scleral/conjunctival abnormalities. RESP: No audible wheeze, cough, or visible cyanosis.  No visible retractions or increased work of breathing. SKIN: Visible skin clear. No significant rash, abnormal pigmentation or lesions. NEURO: Cranial nerves grossly intact.  Mentation and speech appropriate for age. PSYCH: Mentation appears normal, affect normal/bright, judgement and insight intact, normal speech and appearance well-groomed.    He will get laboratory testing when he returns for his upper GI endoscopy.    My impression is Mr. Malloy has alcoholic cirrhosis which seems fairly well compensated at this point in time.  I did strongly advise him to stop all alcohol and tobacco, although he certainly is not ready for that.  I have begun him on diuretics and have counseled him on limiting his salt intake.  As I mentioned he will get blood work done in early September.  He is fully vaccinated against COVID-19.     He is scheduled for variceal recheck in early September and may need some additional banding.  Patient is cleared for endoscopy with MAC.      I will not be making any other change to his medical regimen.  I will see him back in the clinic again in 6 months for repeat ultrasound and blood work.      Thank you very much for allowing me to participate in the care of this patient.  If you have any questions regarding my recommendations, please do not hesitate to contact me.         Zach Larson,  MD      Professor of Medicine  [employer]      Executive Medical Director, Solid Organ Transplant Program  [facility]     Video-Visit Details    Type of service:  Video Visit    Video End Time:3:45 PM    Originating Location (pt. Location): Home    Distant Location (provider location):  [facility] [place]     Platform used for Video Visit: Voucheres

## 2021-08-18 NOTE — LETTER
8/18/2021         RE: Gilles Malloy  [address]  [place] [facility] [address]        Dear Colleague,    Thank you for referring your patient, Gilles Malloy, to the [facility] [place]. Please see a copy of my visit note below.    Gilles is a 61 year old who is being evaluated via a billable video visit.      How would you like to obtain your AVS? By mail  If the video visit is dropped, the invitation should be resent by: [phone]  Will anyone else be joining your video visit?     Video Start Time: 3:20 PM    Subjective   Gilles is a 61 year old who presents for the following health issues: alcoholic cirrhosis with ascites    HPI: I had the pleasure of seeing Brannon Malloy for followup in the Liver Clinic at the [facility] on August 18, 2021.  Mr. Malloy presents for followup of alcoholic cirrhosis.      Unfortunately, he has continued to drink but at least this time he does say he is trying to stop altogether.    He reports drinking about 4 beers per week.      He, nonetheless, is feeling fairly well.  He denies any abdominal pain, itching or skin rash or fatigue.  He does however report some increased abdominal girth and lower extremity edema.  He has not been on diuretics and has not particularly been limiting his salt intake     He denies any fevers or chills, cough or shortness of breath.  He denies any nausea or vomiting, diarrhea or constipation.  His appetite has been good, and his weight has largely remained the same.       The major problem he has had since he was last seen is that he developed upper GI bleeding.   His hemoglobin fell to 6 and he required several units transfusion.  He ultimately underwent both an upper GI endoscopy and colonoscopy and was found to have large esophageal varices which were banded.  He also had a few small polyps, which probably were not the source of the blood loss.  He is scheduled for a  follow-up endoscopy in early September.    PMH:  Hx of breast Ca; 2018.  Surgical hx:  Mastectomy, cholecystectomy, hernia repair X2, nose reconstruction.    SH:  Still using alcohol    Current Outpatient Medications   Medication     ALPRAZolam (XANAX) 0.5 MG tablet     furosemide (LASIX) 40 MG tablet     HYDROcodone-acetaminophen (NORCO)  MG per tablet     multivitamin, therapeutic (THERA-VIT) TABS tablet     spironolactone (ALDACTONE) 50 MG tablet     tamoxifen (NOLVADEX) 20 MG tablet     No current facility-administered medications for this visit.     Objective      Vitals:  No vitals were obtained today due to virtual visit.    Physical Exam: GENERAL: Healthy, alert and no distress. EYES: Eyes grossly normal to inspection.  No discharge or erythema, or obvious scleral/conjunctival abnormalities. RESP: No audible wheeze, cough, or visible cyanosis.  No visible retractions or increased work of breathing. SKIN: Visible skin clear. No significant rash, abnormal pigmentation or lesions. NEURO: Cranial nerves grossly intact.  Mentation and speech appropriate for age. PSYCH: Mentation appears normal, affect normal/bright, judgement and insight intact, normal speech and appearance well-groomed.    He will get laboratory testing when he returns for his upper GI endoscopy.    My impression is Mr. Malloy has alcoholic cirrhosis which seems fairly well compensated at this point in time.  I did strongly advise him to stop all alcohol and tobacco, although he certainly is not ready for that.  I have begun him on diuretics and have counseled him on limiting his salt intake.  As I mentioned he will get blood work done in early September.  He is fully vaccinated against COVID-19.     He is scheduled for variceal recheck in early September and may need some additional banding.  Patient is cleared for endoscopy with MAC.      I will not be making any other change to his medical regimen.  I will see him back in the clinic  again in 6 months for repeat ultrasound and blood work.      Thank you very much for allowing me to participate in the care of this patient.  If you have any questions regarding my recommendations, please do not hesitate to contact me.         Zach Larson MD      Professor of Medicine  [employer]      Executive Medical Director, Solid Organ Transplant Program  [facility]     Video-Visit Details    Type of service:  Video Visit    Video End Time:3:45 PM    Originating Location (pt. Location): Home    Distant Location (provider location):  [facility] [place]     Platform used for Video Visit: Doximity          Again, thank you for allowing me to participate in the care of your patient.        Sincerely,        Zach Larson MD

## 2021-08-19 ENCOUNTER — TELEPHONE (OUTPATIENT)
Dept: GASTROENTEROLOGY | Facility: CLINIC | Age: 61
End: 2021-08-19

## 2021-08-24 ENCOUNTER — TELEPHONE (OUTPATIENT)
Dept: GASTROENTEROLOGY | Facility: CLINIC | Age: 61
End: 2021-08-24

## 2021-08-24 NOTE — TELEPHONE ENCOUNTER
----- Message from Jordy Austin sent at 8/19/2021 11:12 AM CDT -----  Regarding: RE: Lab appt  I LVM with him with the lab scheduling number.  ----- Message -----  From: Berenice Duran LPN  Sent: 8/19/2021   8:31 AM CDT  To: Jordy Austin  Subject: Lab appt                                         Pt is getting an EGD on 9/2. Dr. Larson would like pt scheduled for a lab appointment before EGD is probably best. Please call pt to assist with scheduling lab appointment, orders are in.    Thanks

## 2021-08-25 ENCOUNTER — TELEPHONE (OUTPATIENT)
Dept: GASTROENTEROLOGY | Facility: CLINIC | Age: 61
End: 2021-08-25

## 2021-08-25 NOTE — LETTER
August 25, 2021      Gilles Malloy  [address]  [place] [facility] [address]              Dear Gilles,      Instructions for Your Upper Endoscopy  Your exam is on 9.2.2021 Arrival Time: 10:45am  Please note that your procedure time may change  Check in at: [facility]; [address], [place] [address]    What is an upper endoscopy?  - This is an exam that checks for problems linked to heartburn, swallowing or belly (abdominal) pain or other symptoms of the upper GI tract. Depending on your symptoms, the doctor will look at your esophagus (food pipe), stomach and the part of the stomach that enters the small intestine.     Getting ready  - You must arrange for an adult to drive you home and stay with you after your exam. This person will need to stay with you for 24 hours unless your provider says otherwise.   - Your exam cannot be done unless you have proper transportation. If you need to use public transportation someone must ride with you.  - Dress in comfortable, loose clothing.  - Bring your insurance card. Leave your purse, billfold, credit cards and other valuables at home.  - Bring a list of your medicines and known allergies. If you have a pacemaker or ICD, please bring your information card.  - We do our best to stay on time, but there may be a delay. Please bring something to pass the time, such as a newspaper or book.    - Important: You must complete all steps before the exam.     Seven days before the exam -   - Talk to your doctor: If you take blood-thinners (such as Coumadin, Plavix, Xarelto), your prescription or schedule may need to change before the test.  - Continue taking prescribed aspirin; talk to your prescribing doctor with any concerns.    - If you have diabetes: Ask to have your exam early in the morning. Also, ask your doctor if you should change your diet or medicines    One day before the exam - Date: 9.1.2021  -Stop eating all solid foods at 10 p.m.  You may drink clear liquids.     Day of the exam - Date: 9.2.2021  -You may drink clear liquids until 6 hours before your exam.  -You may take all of your morning medicines (except for diabetes pills) as usual with 4 oz. of water up to 6 hours before your test.  -If you take diabetes medicine (pills): do not take them the morning of your test.  -Bring a list of your medicines and known allergies.  -Please arrive with an adult who can take you home after the test: The medicine will make you sleepy. If you do not have a , we may cancel your test.     What are clear liquids?   You may have:  - Water, tea, coffee (no cream)  - Soda pop, Gatorade   - Clear nutrition drinks (Enlive, Resource Breeze)   - Jell-O, Popsicles (no milk or fruit pieces) or sorbet   - Fat-free soup broth or bouillon  - Plain hard cand, such as clear life savers   - Clear juices and fruit-flavored drinks such as apple juice, white grape juice, Hi-C and Bernardo-Aid    Do not have:  - Milk or milk products such as ice cream, malts or shakes  - Juices with pulp such as orange, grapefruit, pineapple or tomato juice  - Cream soups of any kind  - Alcohol       During the exam  - The exam lasts from 10 to 20 minutes.   - We will review the risks and benefits of the exam. We will then ask you to sign a consent form.  - We will place a small needle (IV) in your hand or arm. We will give you medicine through the IV to keep you comfortable.   -We will spray a numbing medicine into your throat. This will reduce gagging.   - We will help you swallow a flexible tube (the endoscope). You may feel some discomfort at first. The tube will not get in the way of your breathing.  - You will not be able to talk with the endoscope in your mouth. Use hand signals instead.  - When we put air into your stomach, you may feel full or have mild cramps. We will remove the air at the end of the exam.  - To reduce discomfort, breathe at a slow, even pace. Try to relax the  muscles in your neck and shoulders.  - We may take a small piece of tissue (a biopsy) to test in the lab. It will not hurt. We may also take pictures of your insides.     After the exam  - You will rest in the recovery area until you feel ready to leave. This takes about 30 to 60 minutes.   - We will remove the IV.  - You may burp up air left in your stomach.  - You may feel drowsy or a little dizzy from the medicine.   - Your doctor will discuss your results. You will receive your test results in 7 to 10 business days by letter or MyChart.   - Your throat may feel numb. One hour after the exam, swallow a small amount of cool water. If you can swallow easily, you may go back to your regular diet and medicines.  - Your throat may be sore for the rest of the day. Throat lozenges or ice chips may help.  - Do not drive for 24 hours.    Call us at once if you have:  - Unusual pain or problems swallowing, unusual stomach or chest pain.  - Vomit that looks like coffee grounds or black or bloody stools (bowel movements).  - A fever above 100.6  F (37.5  C) when taken under the tongue.    Test results  - You will receive your results in 7 to 10 business days by phone, letter or MyChart.    Schedule your Covid Test:   Please ensure your COVID test is scheduled within 96 hours or 4 days of your procedure. If you have not been contacted to schedule please call [phone].    For questions or appointments, call:  [facility]   [phone], option 2  Monday through Friday, 8 a.m. to 4:30 p.m.  (If it is after hours, call [phone]. Ask for the GI fellow resident on call.)    You should be aware that your endoscopist may be a part of a study to improve endoscopy procedures.  As part of a study, pictures gathered from your procedure may be stored and analyzed in a de-identified manner.

## 2021-08-25 NOTE — TELEPHONE ENCOUNTER
Writer reviewed pre-assessment questions with patient prior to upcoming EGD on 9.2.2021.      Covid test scheduled: Pt will get PCR COVID test at [facility] on 8.29.2021.  Per pt he did this last time and brought result with to appt.  Per pt [facility] will not fax results.  RN informed pt that if positive he needs to r/s.  Pt agreed with plan.      Arrival time: 1050    Facility location: [facility], seeking clarification from Daya Dolan RN Charge at [facility] d/t pt's labs.    Pt has pending labs to have done prior to EGD.  Pt stated he planned to get them done the morning of EGD.  RN advised that pt have these labs drawn prior to that.  Pt was given general lab number [phone] but wants to hold off until clarification is received from [facility] charge RN.     Sedation type: MAC    Electronic Implantable devices? No    Blood thinners/Antiplatelet medication? No    Reviewed EGD prep instructions with patient.  Resent via letter.    Designated  policy reviewed with patient. Pt is currently working on a  to bring him to his appt.  Pt is aware if he does not observe designated  policy he will need to r/s procedure.    Patient verbalized understanding.  No further questions or concerns.    Alisa High RN

## 2021-08-27 NOTE — TELEPHONE ENCOUNTER
Per staff message received:  Daya Pendleton RN Roman, GERMANIA Castillo,     It would be ideal if he could have them drawn sooner rather than later, so we can schedule him accordingly. Also, I saw that his varices were large (grade 3) and I'm not sure the guidelines for banding large varices at the ASC. If I remember right, some providers are not comfortable banding large varices here.  -----------------------------------------------------    Called and spoke to patient regarding above note as to getting labs drawn sooner than day before procedure in order to ensure proper location scheduling for upcoming procedure.     Patient states that he will get his labs drawn tomorrow (8/28). Gave primary number [phone] to call and schedule lab appointment.     Anna Batres RN

## 2021-08-29 ENCOUNTER — LAB (OUTPATIENT)
Dept: LAB | Facility: CLINIC | Age: 61
End: 2021-08-29
Payer: COMMERCIAL

## 2021-08-29 DIAGNOSIS — K70.31 ALCOHOLIC CIRRHOSIS OF LIVER WITH ASCITES (H): ICD-10-CM

## 2021-08-29 LAB
AFP SERPL-MCNC: 4.6 UG/L (ref 0–8)
ALBUMIN SERPL-MCNC: 2.3 G/DL (ref 3.4–5)
ALP SERPL-CCNC: 57 U/L (ref 40–150)
ALT SERPL W P-5'-P-CCNC: 17 U/L (ref 0–70)
ANION GAP SERPL CALCULATED.3IONS-SCNC: 6 MMOL/L (ref 3–14)
AST SERPL W P-5'-P-CCNC: 36 U/L (ref 0–45)
BILIRUB DIRECT SERPL-MCNC: 1 MG/DL (ref 0–0.2)
BILIRUB SERPL-MCNC: 1.3 MG/DL (ref 0.2–1.3)
BUN SERPL-MCNC: 6 MG/DL (ref 7–30)
CALCIUM SERPL-MCNC: 8.8 MG/DL (ref 8.5–10.1)
CHLORIDE BLD-SCNC: 113 MMOL/L (ref 94–109)
CO2 SERPL-SCNC: 23 MMOL/L (ref 20–32)
CREAT SERPL-MCNC: 0.86 MG/DL (ref 0.66–1.25)
GFR SERPL CREATININE-BSD FRML MDRD: >90 ML/MIN/1.73M2
GLUCOSE BLD-MCNC: 85 MG/DL (ref 70–99)
INR PPP: 1.42 (ref 0.85–1.15)
POTASSIUM BLD-SCNC: 4.2 MMOL/L (ref 3.4–5.3)
PROT SERPL-MCNC: 8.1 G/DL (ref 6.8–8.8)
SODIUM SERPL-SCNC: 142 MMOL/L (ref 133–144)

## 2021-08-29 PROCEDURE — 82105 ALPHA-FETOPROTEIN SERUM: CPT

## 2021-08-29 PROCEDURE — 80053 COMPREHEN METABOLIC PANEL: CPT

## 2021-08-29 PROCEDURE — 36415 COLL VENOUS BLD VENIPUNCTURE: CPT

## 2021-08-29 PROCEDURE — 85610 PROTHROMBIN TIME: CPT

## 2021-08-29 PROCEDURE — 85027 COMPLETE CBC AUTOMATED: CPT

## 2021-08-29 RX ORDER — ONDANSETRON 2 MG/ML
4 INJECTION INTRAMUSCULAR; INTRAVENOUS
Status: CANCELLED | OUTPATIENT
Start: 2021-08-29

## 2021-08-29 RX ORDER — LIDOCAINE 40 MG/G
CREAM TOPICAL
Status: CANCELLED | OUTPATIENT
Start: 2021-08-29

## 2021-08-29 RX ORDER — SODIUM CHLORIDE, SODIUM LACTATE, POTASSIUM CHLORIDE, CALCIUM CHLORIDE 600; 310; 30; 20 MG/100ML; MG/100ML; MG/100ML; MG/100ML
INJECTION, SOLUTION INTRAVENOUS CONTINUOUS
Status: CANCELLED | OUTPATIENT
Start: 2021-08-29

## 2021-08-29 NOTE — LETTER
August 30, 2021      Gilles Malloy  [address]  [place] [address]        Dear ,    We are writing to inform you of your test results. Looks OK       Your test results fall within the expected range(s) or remain unchanged from previous results.  Please continue with current treatment plan.    Resulted Orders   Hepatic Panel [LAB20]   Result Value Ref Range    Bilirubin Total 1.3 0.2 - 1.3 mg/dL    Bilirubin Direct 1.0 (H) 0.0 - 0.2 mg/dL    Protein Total 8.1 6.8 - 8.8 g/dL    Albumin 2.3 (L) 3.4 - 5.0 g/dL    Alkaline Phosphatase 57 40 - 150 U/L    AST 36 0 - 45 U/L    ALT 17 0 - 70 U/L   Basic metabolic panel [LAB15]   Result Value Ref Range    Sodium 142 133 - 144 mmol/L    Potassium 4.2 3.4 - 5.3 mmol/L    Chloride 113 (H) 94 - 109 mmol/L    Carbon Dioxide (CO2) 23 20 - 32 mmol/L    Anion Gap 6 3 - 14 mmol/L    Urea Nitrogen 6 (L) 7 - 30 mg/dL    Creatinine 0.86 0.66 - 1.25 mg/dL    Calcium 8.8 8.5 - 10.1 mg/dL    Glucose 85 70 - 99 mg/dL    GFR Estimate >90 >60 mL/min/1.73m2      Comment:      As of July 11, 2021, eGFR is calculated by the CKD-EPI creatinine equation, without race adjustment. eGFR can be influenced by muscle mass, exercise, and diet. The reported eGFR is an estimation only and is only applicable if the renal function is stable.   INR [WAH3329]   Result Value Ref Range    INR 1.42 (H) 0.85 - 1.15      Comment:      Effective 7/11/2021, the reference range for this assay has changed.   AFP tumor marker [EDZ817]   Result Value Ref Range    AFP tumor marker 4.6 0.0 - 8.0 ug/L    Narrative    Reference ranges apply to non-pregnant females only.  Assay Method:  Chemiluminescence using Siemens Centaur  XP       If you have any questions or concerns, please call the clinic at the number listed above.       Sincerely,      Zach Larson MD

## 2021-08-29 NOTE — NURSING NOTE
Chief Complaint   Patient presents with     Blood Draw     Labs drawn via  by RN in lab. Lab only.            Lester Millan RN

## 2021-08-30 ENCOUNTER — TELEPHONE (OUTPATIENT)
Dept: GASTROENTEROLOGY | Facility: CLINIC | Age: 61
End: 2021-08-30

## 2021-08-30 DIAGNOSIS — Z11.59 ENCOUNTER FOR SCREENING FOR OTHER VIRAL DISEASES: ICD-10-CM

## 2021-08-30 LAB
ERYTHROCYTE [DISTWIDTH] IN BLOOD BY AUTOMATED COUNT: 17.9 % (ref 10–15)
HCT VFR BLD AUTO: 32.2 % (ref 40–53)
HGB BLD-MCNC: 10.2 G/DL (ref 13.3–17.7)
MCH RBC QN AUTO: 29.1 PG (ref 26.5–33)
MCHC RBC AUTO-ENTMCNC: 31.7 G/DL (ref 31.5–36.5)
MCV RBC AUTO: 92 FL (ref 78–100)
PLATELET # BLD AUTO: 147 10E3/UL (ref 150–450)
RBC # BLD AUTO: 3.51 10E6/UL (ref 4.4–5.9)
WBC # BLD AUTO: 4.6 10E3/UL (ref 4–11)

## 2021-08-30 NOTE — TELEPHONE ENCOUNTER
Caller: Jesus Malloy    Procedure: EGD    Date of Procedure Cancelled: 09/09/2021    Ordering Provider:Matias García MD    Reason for cancel: Patient needed to be seen at the [facility] by a Hepatologist.    Rescheduled: YES     If rescheduled:    Date: 09/09/2021   Location: [facility]   Note any change or update to original order/sedation: MAC

## 2021-08-30 NOTE — TELEPHONE ENCOUNTER
Per anesthesia patient to be scheduled at [facility].     Will send to endoscopy scheduling team to facilitate facility change per anesthesia recommendations.    Anna Batres RN

## 2021-08-30 NOTE — TELEPHONE ENCOUNTER
Per staff message received by [facility] RN -   Consulting with anesthesia regarding labs to see if ok to keep at [facility].     Writer confirming with [facility] RN that ok to continue scheduled with Dr. King if banding is needed.     Anna Batres RN

## 2021-08-31 ENCOUNTER — TELEPHONE (OUTPATIENT)
Dept: GASTROENTEROLOGY | Facility: CLINIC | Age: 61
End: 2021-08-31

## 2021-08-31 NOTE — TELEPHONE ENCOUNTER
Attempted to contact patient regarding upcoming EGD procedure on 9/9/21 for pre assessment questions. No answer.     Left message to return call to [phone] #2    Covid test scheduled? Left Covid test scheduling number on voicemail [phone]    Pre op exam scheduled? Left PAC number [phone] on voicemail    Arrival time: 0940    Facility location: [facility]    Sedation type: MAC    Rescheduled procedure. Need to verify arrival time/location.     Anna Batres RN

## 2021-08-31 NOTE — TELEPHONE ENCOUNTER
Patient rescheduled to 9/9/21.     Will close this encounter and start new one for rescheduled procedure.     Anna Batres RN

## 2021-09-02 NOTE — TELEPHONE ENCOUNTER
Second attempt.    Attempted to contact patient regarding upcoming EGD procedure on 9.9.2021 for pre assessment questions. No answer.     Left message to return call to [phone] #2    Covid test?    Pre-op?    Arrival time: 0930    Facility location: [facility]    Sedation type: ALIZA High RN

## 2021-09-03 ENCOUNTER — TELEPHONE (OUTPATIENT)
Dept: GASTROENTEROLOGY | Facility: CLINIC | Age: 61
End: 2021-09-03

## 2021-09-03 NOTE — TELEPHONE ENCOUNTER
Writer reviewed pre-assessment questions with patient prior to upcoming EGD on 9.9.2021.    Covid test scheduled: 9.5.2021 at local [facility]; pt states he has gotten his pre procedure COVID tests done here before without any issues.  Pt states they will not fax in results and pt always brings the copy of results with him.  RN did advise pt if results are positive that he will need to r/s.  Pt is agreeable to plan.    Pre-op: Pt stated he was unaware he needed a pre-op appt.  Pt has r/s from [facility] to [facility].  Pt asked if a prior OV could be used, RN stated that the visit needs to have heart and lung assessment with clearance for anesthesia from provider.  Pt stated he is going to call Dr. Larson who he saw on 8.18.2021 to see if the doctor will add this in his visit.  Pt was also given PAC clinic number.  Pt mentioned that he has missed a lot of work due to all of his appointments and this has been difficult for him.     Arrival time: 0930    Facility location: [facility]    Sedation type: MAC    Electronic Implantable devices? No    Blood thinners/Antiplatelet medication? No    Reviewed EGD prep instructions with patient.  Pt has not received mailed out instructions yet.      Designated  policy reviewed with patient.     Patient verbalized understanding.  No further questions or concerns.    Alisa High RN

## 2021-09-03 NOTE — TELEPHONE ENCOUNTER
[facility] Call [facility]    Phone Message    May a detailed message be left on voicemail: yes     Reason for Call: Other: Patient calling in regards to endoscopy that is scheduled for 9/9. Patient states that they are wanting him to get a pre-op to make sure that he is cleared for anesthesia. States that they looked at Dr. Larson notes from 8/18, and it wasn't in the notes but states that they mentioned that Dr. Larson could add that. Patient mentions that then he wouldn't have to miss more work.     Please advise and call patient back to discuss further - states that he would like to know either way if Dr. Larson is able to do that or not.     Action Taken: Other:  HEPATOLOGY    Travel Screening: Not Applicable

## 2021-09-07 NOTE — TELEPHONE ENCOUNTER
"Dr. Larson per TE 9.3.2021 \"I will add that he is cleared for endoscopy.\"    RN sent this information to [facility] Charge RN who stated that per anesthesia this OV will not be accepted as a pre-op.      RN contacted PAC clinic who stated they have one virtual appt available tomorrow at 8:30am.    RN contact pt and offered a PAC virtual clinic appt for tomorrow at 830am.  Pt declined.  Pt stated that he can't do any appointment tomorrow unless it is after 5pm and/or morning of procedure.    Pt stated that he thought Dr. Larson was going to add the necessary info to his 8.18.2021 visit.  RN informed pt that this OV does not include the necessary info per our anesthesia team.  Pt states to just reach out to Dr. Larson and \"let's see what he can do.\"    RN did call Dr. Larson's office and left a message as well as sent a staff message.    RN informed pt that I am unsure, even with more information added, if this OV will be accepted by anesthesia team, therefore, if it is not, pt will need to r/s procedure.    Alisa High RN  "

## 2021-09-08 ENCOUNTER — ANESTHESIA EVENT (OUTPATIENT)
Dept: GASTROENTEROLOGY | Facility: CLINIC | Age: 61
End: 2021-09-08
Payer: COMMERCIAL

## 2021-09-08 NOTE — TELEPHONE ENCOUNTER
"Per Charge nurse at [facility], Valarie Vicente RN,  \"I actually just talked to anesthesia again, explained the situation, and they did approve using the 8/18 note as an H&P\"    RN informed pt of above message.    Pt has no further questions or concerns.      Alisa High RN      "

## 2021-09-09 ENCOUNTER — HOSPITAL ENCOUNTER (OUTPATIENT)
Facility: CLINIC | Age: 61
Discharge: HOME | End: 2021-09-09
Attending: INTERNAL MEDICINE | Admitting: INTERNAL MEDICINE
Payer: COMMERCIAL

## 2021-09-09 ENCOUNTER — ANESTHESIA (OUTPATIENT)
Dept: GASTROENTEROLOGY | Facility: CLINIC | Age: 61
End: 2021-09-09
Payer: COMMERCIAL

## 2021-09-09 VITALS
RESPIRATION RATE: 18 BRPM | SYSTOLIC BLOOD PRESSURE: 144 MMHG | OXYGEN SATURATION: 100 % | TEMPERATURE: 98 F | HEART RATE: 96 BPM | DIASTOLIC BLOOD PRESSURE: 90 MMHG | WEIGHT: 151.9 LBS | HEIGHT: 69 IN | BODY MASS INDEX: 22.5 KG/M2

## 2021-09-09 DIAGNOSIS — D62 ANEMIA DUE TO BLOOD LOSS, ACUTE: ICD-10-CM

## 2021-09-09 DIAGNOSIS — K70.31 ALCOHOLIC CIRRHOSIS OF LIVER WITH ASCITES (H): Primary | ICD-10-CM

## 2021-09-09 DIAGNOSIS — R06.02 SHORTNESS OF BREATH: ICD-10-CM

## 2021-09-09 LAB — UPPER GI ENDOSCOPY: NORMAL

## 2021-09-09 PROCEDURE — 250N000009 HC RX 250: Performed by: NURSE ANESTHETIST, CERTIFIED REGISTERED

## 2021-09-09 PROCEDURE — 250N000011 HC RX IP 250 OP 636: Performed by: NURSE ANESTHETIST, CERTIFIED REGISTERED

## 2021-09-09 PROCEDURE — 370N000017 HC ANESTHESIA TECHNICAL FEE, PER MIN: Performed by: INTERNAL MEDICINE

## 2021-09-09 PROCEDURE — 43244 EGD VARICES LIGATION: CPT | Performed by: INTERNAL MEDICINE

## 2021-09-09 PROCEDURE — 258N000003 HC RX IP 258 OP 636: Performed by: NURSE ANESTHETIST, CERTIFIED REGISTERED

## 2021-09-09 RX ORDER — OXYCODONE HYDROCHLORIDE 5 MG/1
5 TABLET ORAL EVERY 4 HOURS PRN
Status: DISCONTINUED | OUTPATIENT
Start: 2021-09-09 | End: 2021-09-09 | Stop reason: HOSPADM

## 2021-09-09 RX ORDER — PROPOFOL 10 MG/ML
INJECTION, EMULSION INTRAVENOUS CONTINUOUS PRN
Status: DISCONTINUED | OUTPATIENT
Start: 2021-09-09 | End: 2021-09-09

## 2021-09-09 RX ORDER — ONDANSETRON 4 MG/1
4 TABLET, ORALLY DISINTEGRATING ORAL EVERY 6 HOURS PRN
Status: DISCONTINUED | OUTPATIENT
Start: 2021-09-09 | End: 2021-09-09 | Stop reason: HOSPADM

## 2021-09-09 RX ORDER — ONDANSETRON 4 MG/1
4 TABLET, ORALLY DISINTEGRATING ORAL EVERY 30 MIN PRN
Status: DISCONTINUED | OUTPATIENT
Start: 2021-09-09 | End: 2021-09-09 | Stop reason: HOSPADM

## 2021-09-09 RX ORDER — ONDANSETRON 2 MG/ML
4 INJECTION INTRAMUSCULAR; INTRAVENOUS EVERY 30 MIN PRN
Status: DISCONTINUED | OUTPATIENT
Start: 2021-09-09 | End: 2021-09-09 | Stop reason: HOSPADM

## 2021-09-09 RX ORDER — NALOXONE HYDROCHLORIDE 0.4 MG/ML
0.4 INJECTION, SOLUTION INTRAMUSCULAR; INTRAVENOUS; SUBCUTANEOUS
Status: DISCONTINUED | OUTPATIENT
Start: 2021-09-09 | End: 2021-09-09 | Stop reason: HOSPADM

## 2021-09-09 RX ORDER — MEPERIDINE HYDROCHLORIDE 25 MG/ML
12.5 INJECTION INTRAMUSCULAR; INTRAVENOUS; SUBCUTANEOUS
Status: DISCONTINUED | OUTPATIENT
Start: 2021-09-09 | End: 2021-09-09 | Stop reason: HOSPADM

## 2021-09-09 RX ORDER — FLUMAZENIL 0.1 MG/ML
0.2 INJECTION, SOLUTION INTRAVENOUS
Status: DISCONTINUED | OUTPATIENT
Start: 2021-09-09 | End: 2021-09-09 | Stop reason: HOSPADM

## 2021-09-09 RX ORDER — LIDOCAINE 40 MG/G
CREAM TOPICAL
Status: DISCONTINUED | OUTPATIENT
Start: 2021-09-09 | End: 2021-09-09 | Stop reason: HOSPADM

## 2021-09-09 RX ORDER — NALOXONE HYDROCHLORIDE 0.4 MG/ML
0.2 INJECTION, SOLUTION INTRAMUSCULAR; INTRAVENOUS; SUBCUTANEOUS
Status: DISCONTINUED | OUTPATIENT
Start: 2021-09-09 | End: 2021-09-09 | Stop reason: HOSPADM

## 2021-09-09 RX ORDER — PROCHLORPERAZINE MALEATE 10 MG
10 TABLET ORAL EVERY 6 HOURS PRN
Status: DISCONTINUED | OUTPATIENT
Start: 2021-09-09 | End: 2021-09-09 | Stop reason: HOSPADM

## 2021-09-09 RX ORDER — ONDANSETRON 2 MG/ML
4 INJECTION INTRAMUSCULAR; INTRAVENOUS
Status: DISCONTINUED | OUTPATIENT
Start: 2021-09-09 | End: 2021-09-09 | Stop reason: HOSPADM

## 2021-09-09 RX ORDER — SODIUM CHLORIDE, SODIUM LACTATE, POTASSIUM CHLORIDE, CALCIUM CHLORIDE 600; 310; 30; 20 MG/100ML; MG/100ML; MG/100ML; MG/100ML
INJECTION, SOLUTION INTRAVENOUS CONTINUOUS
Status: DISCONTINUED | OUTPATIENT
Start: 2021-09-09 | End: 2021-09-09 | Stop reason: HOSPADM

## 2021-09-09 RX ORDER — ONDANSETRON 2 MG/ML
4 INJECTION INTRAMUSCULAR; INTRAVENOUS EVERY 6 HOURS PRN
Status: DISCONTINUED | OUTPATIENT
Start: 2021-09-09 | End: 2021-09-09 | Stop reason: HOSPADM

## 2021-09-09 RX ORDER — SODIUM CHLORIDE, SODIUM LACTATE, POTASSIUM CHLORIDE, CALCIUM CHLORIDE 600; 310; 30; 20 MG/100ML; MG/100ML; MG/100ML; MG/100ML
INJECTION, SOLUTION INTRAVENOUS CONTINUOUS PRN
Status: DISCONTINUED | OUTPATIENT
Start: 2021-09-09 | End: 2021-09-09

## 2021-09-09 RX ADMIN — MIDAZOLAM 2 MG: 1 INJECTION INTRAMUSCULAR; INTRAVENOUS at 11:14

## 2021-09-09 RX ADMIN — PROPOFOL 200 MCG/KG/MIN: 10 INJECTION, EMULSION INTRAVENOUS at 11:15

## 2021-09-09 RX ADMIN — TOPICAL ANESTHETIC 2 EACH: 200 SPRAY DENTAL; PERIODONTAL at 11:06

## 2021-09-09 RX ADMIN — SODIUM CHLORIDE, POTASSIUM CHLORIDE, SODIUM LACTATE AND CALCIUM CHLORIDE: 600; 310; 30; 20 INJECTION, SOLUTION INTRAVENOUS at 11:06

## 2021-09-09 ASSESSMENT — LIFESTYLE VARIABLES: TOBACCO_USE: 1

## 2021-09-09 ASSESSMENT — MIFFLIN-ST. JEOR: SCORE: 1474.38

## 2021-09-09 NOTE — OR NURSING
Dr. Stafford spoke with pt regarding procedure findings, interventions, and follow up. All questions answered. Discharge instructions reviewed with pts cameron Madden and pt. Pt stable upon discharge.

## 2021-09-09 NOTE — ANESTHESIA CARE TRANSFER NOTE
Patient: Gilles Malloy    Procedure(s):  ESOPHAGOGASTRODUODENOSCOPY, WITH BANDING OF VARICES    Diagnosis: Alcoholic cirrhosis of liver with ascites (H) [K70.31]  Diagnosis Additional Information: No value filed.    Anesthesia Type:   MAC     Note:      Level of Consciousness: awake  Oxygen Supplementation: nasal cannula    Independent Airway: airway patency satisfactory and stable  Dentition: dentition unchanged  Vital Signs Stable: post-procedure vital signs reviewed and stable  Report to RN Given: handoff report given  Patient transferred to: Phase II  Comments: Pt remains stable, monitors on alarms in place, report to PACU RN, no complications  Handoff Report: Identifed the Patient, Identified the Reponsible Provider, Reviewed the pertinent medical history, Discussed the surgical course, Reviewed Intra-OP anesthesia mangement and issues during anesthesia, Set expectations for post-procedure period and Allowed opportunity for questions and acknowledgement of understanding      Vitals:  Vitals Value Taken Time   BP     Temp     Pulse     Resp     SpO2         Electronically Signed By: CARMELITA De La Torre CRNA  September 9, 2021  11:45 AM

## 2021-09-09 NOTE — ANESTHESIA PREPROCEDURE EVALUATION
Anesthesia Pre-Procedure Evaluation    Patient: Gilles Malloy   MRN: [id number] : 1960        Preoperative Diagnosis: Anemia, unspecified type [D64.9]   Procedure : Procedure(s):  COLONOSCOPY  ESOPHAGOGASTRODUODENOSCOPY (EGD)     Past Medical History:   Diagnosis Date     Alcoholic liver disease (H)     Pt has cut down on alcoholic intake and has not needed a paracentesis since  or      Alcoholism (H)      Breast cancer (H) 2018    Lt sided      Past Surgical History:   Procedure Laterality Date     CHOLECYSTECTOMY       COLONOSCOPY N/A 7/15/2021    Procedure: Colonoscopy, With Polypectomy And Biopsy;  Surgeon: Matias García MD;  Location: [facility]     HERNIA REPAIR      x 2     MANDIBLE SURGERY      Dental jaw surgery 2008     MASTECTOMY Left 2018     RECONSTRUCT NOSE AND SEPTUM (FUNCTIONAL)        Allergies   Allergen Reactions     Penicillins      Sulfa Drugs       Social History     Tobacco Use     Smoking status: Current Every Day Smoker     Packs/day: 0.75     Smokeless tobacco: Never Used   Substance Use Topics     Alcohol use: Yes     Comment: daily 2-4 beers or couple glasses of wine      Wt Readings from Last 1 Encounters:   21 68.9 kg (151 lb 14.4 oz)        Anesthesia Evaluation   Pt has had prior anesthetic. Type: MAC.    No history of anesthetic complications       ROS/MED HX  ENT/Pulmonary:     (+) tobacco use, Current use,     Neurologic:  - neg neurologic ROS     Cardiovascular:  - neg cardiovascular ROS     METS/Exercise Tolerance:     Hematologic:     (+) anemia,     Musculoskeletal:       GI/Hepatic: Comment: # H/o GI bleed. His hemoglobin fell to 6 and he required several units transfusion.   # found to have large esophageal varices which were banded      Renal/Genitourinary:  - neg Renal ROS     Endo:  - neg endo ROS     Psychiatric/Substance Use:     (+) alcohol abuse     Infectious Disease:  - neg infectious disease ROS     Malignancy:   (+)  Malignancy, History of Breast.    Other:            Physical Exam    Airway        Mallampati: II   TM distance: > 3 FB   Neck ROM: full   Mouth opening: > 3 cm    Respiratory Devices and Support         Dental     Comment: Poor dentition, black decay evident in all front teeth, denies loose        Cardiovascular   cardiovascular exam normal          Pulmonary   pulmonary exam normal            Other findings: Ascites     OUTSIDE LABS:  CBC:   Lab Results   Component Value Date    WBC 4.6 08/29/2021    WBC 5.8 06/29/2021    HGB 10.2 (L) 08/29/2021    HGB 8.0 (L) 06/30/2021    HCT 32.2 (L) 08/29/2021    HCT 20.5 (L) 06/29/2021     (L) 08/29/2021     06/29/2021     BMP:   Lab Results   Component Value Date     08/29/2021     06/29/2021    POTASSIUM 4.2 08/29/2021    POTASSIUM 4.1 06/29/2021    CHLORIDE 113 (H) 08/29/2021    CHLORIDE 110 (H) 06/29/2021    CO2 23 08/29/2021    CO2 23 06/29/2021    BUN 6 (L) 08/29/2021    BUN 14 06/29/2021    CR 0.86 08/29/2021    CR 1.12 06/29/2021    GLC 85 08/29/2021    GLC 87 06/29/2021     COAGS:   Lab Results   Component Value Date    INR 1.42 (H) 08/29/2021     POC: No results found for: BGM, HCG, HCGS  HEPATIC:   Lab Results   Component Value Date    ALBUMIN 2.3 (L) 08/29/2021    PROTTOTAL 8.1 08/29/2021    ALT 17 08/29/2021    AST 36 08/29/2021    ALKPHOS 57 08/29/2021    BILITOTAL 1.3 08/29/2021     OTHER:   Lab Results   Component Value Date    LUCIA 8.8 08/29/2021       Anesthesia Plan    ASA Status:  3      Anesthesia Type: MAC.     - Reason for MAC: chronic cardiopulmonary disease   Induction: Propofol.           Consents    Anesthesia Plan(s) and associated risks, benefits, and realistic alternatives discussed. Questions answered and patient/representative(s) expressed understanding.     - Discussed with:  Patient         Postoperative Care       PONV prophylaxis: Background Propofol Infusion     Comments:         H&P reviewed: Unable to attach H&P  to encounter due to EHR limitations. H&P Update: appropriate H&P reviewed, patient examined. No interval changes since H&P (within 30 days).             Vanesa Navarrete MD

## 2021-09-09 NOTE — ANESTHESIA POSTPROCEDURE EVALUATION
Patient: Gilles Malloy    Procedure(s):  ESOPHAGOGASTRODUODENOSCOPY, WITH BANDING OF VARICES    Diagnosis:Alcoholic cirrhosis of liver with ascites (H) [K70.31]  Diagnosis Additional Information: No value filed.    Anesthesia Type:  MAC    Note:  Disposition: Outpatient   Postop Pain Control: Uneventful            Sign Out: Well controlled pain   PONV: No   Neuro/Psych: Uneventful            Sign Out: Acceptable/Baseline neuro status   Airway/Respiratory: Uneventful            Sign Out: Acceptable/Baseline resp. status   CV/Hemodynamics: Uneventful            Sign Out: Acceptable CV status; No obvious hypovolemia; No obvious fluid overload   Other NRE: NONE   DID A NON-ROUTINE EVENT OCCUR? No           Last vitals:  Vitals Value Taken Time   /90 09/09/21 1150   Temp     Pulse 96 09/09/21 1150   Resp 18 09/09/21 1150   SpO2 99 % 09/09/21 1158   Vitals shown include unvalidated device data.    Electronically Signed By: Vanesa Navarrete MD  September 9, 2021  12:08 PM

## 2021-09-09 NOTE — OR NURSING
Pt had EGD with variceal banding. Pt tolerated procedure well. Report given to Jamal SOLO for transport to  once patient wakes up a bit.

## 2021-11-18 DIAGNOSIS — K70.31 ALCOHOLIC CIRRHOSIS OF LIVER WITH ASCITES (H): ICD-10-CM

## 2021-11-18 RX ORDER — FUROSEMIDE 40 MG
TABLET ORAL
Qty: 180 TABLET | Refills: 1 | OUTPATIENT
Start: 2021-11-18

## 2022-07-31 ENCOUNTER — HOSPITAL ENCOUNTER (INPATIENT)
Facility: CLINIC | Age: 62
LOS: 3 days | Discharge: HOME | DRG: 432 | End: 2022-08-03
Attending: EMERGENCY MEDICINE | Admitting: INTERNAL MEDICINE
Payer: COMMERCIAL

## 2022-07-31 ENCOUNTER — APPOINTMENT (OUTPATIENT)
Dept: CT IMAGING | Facility: CLINIC | Age: 62
DRG: 432 | End: 2022-07-31
Attending: EMERGENCY MEDICINE
Payer: COMMERCIAL

## 2022-07-31 DIAGNOSIS — K70.31 ALCOHOLIC CIRRHOSIS OF LIVER WITH ASCITES (H): ICD-10-CM

## 2022-07-31 DIAGNOSIS — K92.2 ACUTE GASTROINTESTINAL HEMORRHAGE: ICD-10-CM

## 2022-07-31 DIAGNOSIS — K92.0 GASTROINTESTINAL HEMORRHAGE WITH HEMATEMESIS: ICD-10-CM

## 2022-07-31 DIAGNOSIS — N17.9 ACUTE KIDNEY INJURY (H): ICD-10-CM

## 2022-07-31 DIAGNOSIS — E87.5 HYPERPOTASSEMIA: ICD-10-CM

## 2022-07-31 DIAGNOSIS — I95.9 HYPOTENSION, UNSPECIFIED HYPOTENSION TYPE: ICD-10-CM

## 2022-07-31 DIAGNOSIS — E87.5 HYPERKALEMIA: ICD-10-CM

## 2022-07-31 DIAGNOSIS — D62 ANEMIA DUE TO BLOOD LOSS, ACUTE: Primary | ICD-10-CM

## 2022-07-31 DIAGNOSIS — Z11.52 ENCOUNTER FOR SCREENING LABORATORY TESTING FOR SEVERE ACUTE RESPIRATORY SYNDROME CORONAVIRUS 2 (SARS-COV-2): ICD-10-CM

## 2022-07-31 DIAGNOSIS — K92.0 HEMATEMESIS, PRESENCE OF NAUSEA NOT SPECIFIED: ICD-10-CM

## 2022-07-31 LAB
ABO/RH(D): NORMAL
ALBUMIN SERPL BCG-MCNC: 2.2 G/DL (ref 3.5–5.2)
ALBUMIN SERPL BCG-MCNC: 2.5 G/DL (ref 3.5–5.2)
ALBUMIN UR-MCNC: NEGATIVE MG/DL
ALP SERPL-CCNC: 44 U/L (ref 40–129)
ALP SERPL-CCNC: 52 U/L (ref 40–129)
ALT SERPL W P-5'-P-CCNC: 69 U/L (ref 10–50)
ALT SERPL W P-5'-P-CCNC: 71 U/L (ref 10–50)
AMMONIA PLAS-SCNC: 23 UMOL/L (ref 16–60)
ANION GAP SERPL CALCULATED.3IONS-SCNC: 16 MMOL/L (ref 7–15)
ANION GAP SERPL CALCULATED.3IONS-SCNC: 21 MMOL/L (ref 7–15)
ANTIBODY SCREEN: NEGATIVE
APPEARANCE UR: ABNORMAL
APTT PPP: 44 SECONDS (ref 22–38)
AST SERPL W P-5'-P-CCNC: 129 U/L (ref 10–50)
AST SERPL W P-5'-P-CCNC: 130 U/L (ref 10–50)
BACTERIA #/AREA URNS HPF: ABNORMAL /HPF
BASOPHILS # BLD AUTO: 0 10E3/UL (ref 0–0.2)
BASOPHILS NFR BLD AUTO: 0 %
BILIRUB SERPL-MCNC: 1.5 MG/DL
BILIRUB SERPL-MCNC: 1.9 MG/DL
BILIRUB UR QL STRIP: NEGATIVE
BLD PROD TYP BPU: NORMAL
BLOOD COMPONENT TYPE: NORMAL
BUN SERPL-MCNC: 69.1 MG/DL (ref 8–23)
BUN SERPL-MCNC: 73.3 MG/DL (ref 8–23)
CALCIUM SERPL-MCNC: 7.7 MG/DL (ref 8.8–10.2)
CALCIUM SERPL-MCNC: 8.7 MG/DL (ref 8.8–10.2)
CHLORIDE SERPL-SCNC: 96 MMOL/L (ref 98–107)
CHLORIDE SERPL-SCNC: 98 MMOL/L (ref 98–107)
CODING SYSTEM: NORMAL
COLOR UR AUTO: YELLOW
CREAT SERPL-MCNC: 3.25 MG/DL (ref 0.67–1.17)
CREAT SERPL-MCNC: 3.61 MG/DL (ref 0.67–1.17)
CROSSMATCH: NORMAL
DEPRECATED HCO3 PLAS-SCNC: 15 MMOL/L (ref 22–29)
DEPRECATED HCO3 PLAS-SCNC: 18 MMOL/L (ref 22–29)
EOSINOPHIL # BLD AUTO: 0 10E3/UL (ref 0–0.7)
EOSINOPHIL NFR BLD AUTO: 0 %
ERYTHROCYTE [DISTWIDTH] IN BLOOD BY AUTOMATED COUNT: 14.8 % (ref 10–15)
ERYTHROCYTE [DISTWIDTH] IN BLOOD BY AUTOMATED COUNT: 15.3 % (ref 10–15)
ETHANOL SERPL-MCNC: <0.01 G/DL
GFR SERPL CREATININE-BSD FRML MDRD: 18 ML/MIN/1.73M2
GFR SERPL CREATININE-BSD FRML MDRD: 20 ML/MIN/1.73M2
GLUCOSE BLDC GLUCOMTR-MCNC: 109 MG/DL (ref 70–99)
GLUCOSE BLDC GLUCOMTR-MCNC: 180 MG/DL (ref 70–99)
GLUCOSE BLDC GLUCOMTR-MCNC: 207 MG/DL (ref 70–99)
GLUCOSE BLDC GLUCOMTR-MCNC: 299 MG/DL (ref 70–99)
GLUCOSE BLDC GLUCOMTR-MCNC: 363 MG/DL (ref 70–99)
GLUCOSE SERPL-MCNC: 342 MG/DL (ref 70–99)
GLUCOSE SERPL-MCNC: 86 MG/DL (ref 70–99)
GLUCOSE UR STRIP-MCNC: NEGATIVE MG/DL
HCT VFR BLD AUTO: 18 % (ref 40–53)
HCT VFR BLD AUTO: 19.3 % (ref 40–53)
HGB BLD-MCNC: 5.7 G/DL (ref 13.3–17.7)
HGB BLD-MCNC: 6.1 G/DL (ref 13.3–17.7)
HGB BLD-MCNC: 7 G/DL (ref 13.3–17.7)
HGB BLD-MCNC: 7.6 G/DL (ref 13.3–17.7)
HGB UR QL STRIP: ABNORMAL
HOLD SPECIMEN: NORMAL
HYALINE CASTS: 8 /LPF
IMM GRANULOCYTES # BLD: 0.1 10E3/UL
IMM GRANULOCYTES NFR BLD: 1 %
INR PPP: 2.31 (ref 0.85–1.15)
ISSUE DATE AND TIME: NORMAL
KETONES UR STRIP-MCNC: ABNORMAL MG/DL
LACTATE SERPL-SCNC: 5.5 MMOL/L (ref 0.7–2)
LACTATE SERPL-SCNC: 7.3 MMOL/L (ref 0.7–2)
LACTATE SERPL-SCNC: 7.4 MMOL/L (ref 0.7–2)
LEUKOCYTE ESTERASE UR QL STRIP: ABNORMAL
LIPASE SERPL-CCNC: 59 U/L (ref 13–60)
LYMPHOCYTES # BLD AUTO: 0.9 10E3/UL (ref 0.8–5.3)
LYMPHOCYTES NFR BLD AUTO: 7 %
MAGNESIUM SERPL-MCNC: 2.4 MG/DL (ref 1.7–2.3)
MCH RBC QN AUTO: 33.7 PG (ref 26.5–33)
MCH RBC QN AUTO: 33.9 PG (ref 26.5–33)
MCHC RBC AUTO-ENTMCNC: 31.6 G/DL (ref 31.5–36.5)
MCHC RBC AUTO-ENTMCNC: 31.7 G/DL (ref 31.5–36.5)
MCV RBC AUTO: 107 FL (ref 78–100)
MCV RBC AUTO: 107 FL (ref 78–100)
MONOCYTES # BLD AUTO: 0.9 10E3/UL (ref 0–1.3)
MONOCYTES NFR BLD AUTO: 7 %
MUCOUS THREADS #/AREA URNS LPF: PRESENT /LPF
NEUTROPHILS # BLD AUTO: 10.1 10E3/UL (ref 1.6–8.3)
NEUTROPHILS NFR BLD AUTO: 85 %
NITRATE UR QL: NEGATIVE
NRBC # BLD AUTO: 0 10E3/UL
NRBC BLD AUTO-RTO: 0 /100
PH UR STRIP: 5 [PH] (ref 5–7)
PLATELET # BLD AUTO: 64 10E3/UL (ref 150–450)
PLATELET # BLD AUTO: 81 10E3/UL (ref 150–450)
POTASSIUM SERPL-SCNC: 4.6 MMOL/L (ref 3.4–5.3)
POTASSIUM SERPL-SCNC: 5 MMOL/L (ref 3.4–5.3)
POTASSIUM SERPL-SCNC: 5 MMOL/L (ref 3.4–5.3)
POTASSIUM SERPL-SCNC: 7 MMOL/L (ref 3.4–5.3)
POTASSIUM SERPL-SCNC: 7 MMOL/L (ref 3.4–5.3)
PROT SERPL-MCNC: 4.6 G/DL (ref 6.4–8.3)
PROT SERPL-MCNC: 5.1 G/DL (ref 6.4–8.3)
RBC # BLD AUTO: 1.68 10E6/UL (ref 4.4–5.9)
RBC # BLD AUTO: 1.81 10E6/UL (ref 4.4–5.9)
RBC URINE: <1 /HPF
SARS-COV-2 RNA RESP QL NAA+PROBE: NEGATIVE
SODIUM SERPL-SCNC: 132 MMOL/L (ref 136–145)
SODIUM SERPL-SCNC: 132 MMOL/L (ref 136–145)
SP GR UR STRIP: 1.02 (ref 1–1.03)
SPECIMEN EXPIRATION DATE: NORMAL
SQUAMOUS EPITHELIAL: 1 /HPF
UNIT ABO/RH: NORMAL
UNIT NUMBER: NORMAL
UNIT STATUS: NORMAL
UNIT TYPE ISBT: 5100
UROBILINOGEN UR STRIP-MCNC: NORMAL MG/DL
WBC # BLD AUTO: 10.4 10E3/UL (ref 4–11)
WBC # BLD AUTO: 12 10E3/UL (ref 4–11)
WBC URINE: 6 /HPF

## 2022-07-31 PROCEDURE — U0005 INFEC AGEN DETEC AMPLI PROBE: HCPCS | Performed by: EMERGENCY MEDICINE

## 2022-07-31 PROCEDURE — HZ2ZZZZ DETOXIFICATION SERVICES FOR SUBSTANCE ABUSE TREATMENT: ICD-10-PCS | Performed by: INTERNAL MEDICINE

## 2022-07-31 PROCEDURE — 81001 URINALYSIS AUTO W/SCOPE: CPT | Performed by: EMERGENCY MEDICINE

## 2022-07-31 PROCEDURE — 250N000011 HC RX IP 250 OP 636: Mod: JA | Performed by: EMERGENCY MEDICINE

## 2022-07-31 PROCEDURE — 80053 COMPREHEN METABOLIC PANEL: CPT | Performed by: EMERGENCY MEDICINE

## 2022-07-31 PROCEDURE — 36415 COLL VENOUS BLD VENIPUNCTURE: CPT

## 2022-07-31 PROCEDURE — 258N000003 HC RX IP 258 OP 636: Performed by: EMERGENCY MEDICINE

## 2022-07-31 PROCEDURE — 86923 COMPATIBILITY TEST ELECTRIC: CPT

## 2022-07-31 PROCEDURE — 82803 BLOOD GASES ANY COMBINATION: CPT

## 2022-07-31 PROCEDURE — 74178 CT ABD&PLV WO CNTR FLWD CNTR: CPT | Mod: 26 | Performed by: RADIOLOGY

## 2022-07-31 PROCEDURE — 96375 TX/PRO/DX INJ NEW DRUG ADDON: CPT | Performed by: EMERGENCY MEDICINE

## 2022-07-31 PROCEDURE — 85027 COMPLETE CBC AUTOMATED: CPT | Performed by: STUDENT IN AN ORGANIZED HEALTH CARE EDUCATION/TRAINING PROGRAM

## 2022-07-31 PROCEDURE — 99207 PR NO BILLABLE SERVICE THIS VISIT: CPT | Performed by: INTERNAL MEDICINE

## 2022-07-31 PROCEDURE — 94640 AIRWAY INHALATION TREATMENT: CPT | Performed by: EMERGENCY MEDICINE

## 2022-07-31 PROCEDURE — 96376 TX/PRO/DX INJ SAME DRUG ADON: CPT | Performed by: EMERGENCY MEDICINE

## 2022-07-31 PROCEDURE — 83690 ASSAY OF LIPASE: CPT | Performed by: EMERGENCY MEDICINE

## 2022-07-31 PROCEDURE — 99285 EMERGENCY DEPT VISIT HI MDM: CPT | Mod: 25 | Performed by: EMERGENCY MEDICINE

## 2022-07-31 PROCEDURE — 85730 THROMBOPLASTIN TIME PARTIAL: CPT | Performed by: EMERGENCY MEDICINE

## 2022-07-31 PROCEDURE — 83605 ASSAY OF LACTIC ACID: CPT | Performed by: EMERGENCY MEDICINE

## 2022-07-31 PROCEDURE — 84132 ASSAY OF SERUM POTASSIUM: CPT | Performed by: STUDENT IN AN ORGANIZED HEALTH CARE EDUCATION/TRAINING PROGRAM

## 2022-07-31 PROCEDURE — 87040 BLOOD CULTURE FOR BACTERIA: CPT | Performed by: EMERGENCY MEDICINE

## 2022-07-31 PROCEDURE — 84132 ASSAY OF SERUM POTASSIUM: CPT | Performed by: EMERGENCY MEDICINE

## 2022-07-31 PROCEDURE — 250N000009 HC RX 250: Performed by: EMERGENCY MEDICINE

## 2022-07-31 PROCEDURE — 96366 THER/PROPH/DIAG IV INF ADDON: CPT | Performed by: EMERGENCY MEDICINE

## 2022-07-31 PROCEDURE — 82140 ASSAY OF AMMONIA: CPT | Performed by: EMERGENCY MEDICINE

## 2022-07-31 PROCEDURE — 86850 RBC ANTIBODY SCREEN: CPT | Performed by: EMERGENCY MEDICINE

## 2022-07-31 PROCEDURE — 36415 COLL VENOUS BLD VENIPUNCTURE: CPT | Performed by: EMERGENCY MEDICINE

## 2022-07-31 PROCEDURE — 36430 TRANSFUSION BLD/BLD COMPNT: CPT | Performed by: EMERGENCY MEDICINE

## 2022-07-31 PROCEDURE — 258N000001 HC RX 258: Performed by: EMERGENCY MEDICINE

## 2022-07-31 PROCEDURE — 99292 CRITICAL CARE ADDL 30 MIN: CPT | Mod: 25 | Performed by: EMERGENCY MEDICINE

## 2022-07-31 PROCEDURE — 250N000013 HC RX MED GY IP 250 OP 250 PS 637: Performed by: EMERGENCY MEDICINE

## 2022-07-31 PROCEDURE — 96361 HYDRATE IV INFUSION ADD-ON: CPT | Performed by: EMERGENCY MEDICINE

## 2022-07-31 PROCEDURE — P9016 RBC LEUKOCYTES REDUCED: HCPCS

## 2022-07-31 PROCEDURE — 36415 COLL VENOUS BLD VENIPUNCTURE: CPT | Performed by: STUDENT IN AN ORGANIZED HEALTH CARE EDUCATION/TRAINING PROGRAM

## 2022-07-31 PROCEDURE — 99223 1ST HOSP IP/OBS HIGH 75: CPT | Mod: GC | Performed by: INTERNAL MEDICINE

## 2022-07-31 PROCEDURE — 200N000002 HC R&B ICU UMMC

## 2022-07-31 PROCEDURE — 83735 ASSAY OF MAGNESIUM: CPT | Performed by: STUDENT IN AN ORGANIZED HEALTH CARE EDUCATION/TRAINING PROGRAM

## 2022-07-31 PROCEDURE — C9113 INJ PANTOPRAZOLE SODIUM, VIA: HCPCS | Performed by: EMERGENCY MEDICINE

## 2022-07-31 PROCEDURE — 85018 HEMOGLOBIN: CPT

## 2022-07-31 PROCEDURE — 93005 ELECTROCARDIOGRAM TRACING: CPT | Performed by: EMERGENCY MEDICINE

## 2022-07-31 PROCEDURE — 96365 THER/PROPH/DIAG IV INF INIT: CPT | Mod: 59 | Performed by: EMERGENCY MEDICINE

## 2022-07-31 PROCEDURE — 82077 ASSAY SPEC XCP UR&BREATH IA: CPT | Performed by: EMERGENCY MEDICINE

## 2022-07-31 PROCEDURE — 250N000009 HC RX 250

## 2022-07-31 PROCEDURE — 85610 PROTHROMBIN TIME: CPT | Performed by: EMERGENCY MEDICINE

## 2022-07-31 PROCEDURE — 258N000003 HC RX IP 258 OP 636

## 2022-07-31 PROCEDURE — 250N000011 HC RX IP 250 OP 636: Performed by: EMERGENCY MEDICINE

## 2022-07-31 PROCEDURE — 84300 ASSAY OF URINE SODIUM: CPT | Performed by: STUDENT IN AN ORGANIZED HEALTH CARE EDUCATION/TRAINING PROGRAM

## 2022-07-31 PROCEDURE — 83605 ASSAY OF LACTIC ACID: CPT

## 2022-07-31 PROCEDURE — 85018 HEMOGLOBIN: CPT | Performed by: EMERGENCY MEDICINE

## 2022-07-31 PROCEDURE — 99291 CRITICAL CARE FIRST HOUR: CPT | Mod: 25 | Performed by: EMERGENCY MEDICINE

## 2022-07-31 PROCEDURE — C9803 HOPD COVID-19 SPEC COLLECT: HCPCS | Performed by: EMERGENCY MEDICINE

## 2022-07-31 PROCEDURE — 83605 ASSAY OF LACTIC ACID: CPT | Performed by: STUDENT IN AN ORGANIZED HEALTH CARE EDUCATION/TRAINING PROGRAM

## 2022-07-31 PROCEDURE — 84132 ASSAY OF SERUM POTASSIUM: CPT

## 2022-07-31 PROCEDURE — 93010 ELECTROCARDIOGRAM REPORT: CPT | Performed by: EMERGENCY MEDICINE

## 2022-07-31 PROCEDURE — 80321 ALCOHOLS BIOMARKERS 1OR 2: CPT | Performed by: HOSPITALIST

## 2022-07-31 PROCEDURE — 86923 COMPATIBILITY TEST ELECTRIC: CPT | Performed by: EMERGENCY MEDICINE

## 2022-07-31 PROCEDURE — P9016 RBC LEUKOCYTES REDUCED: HCPCS | Performed by: EMERGENCY MEDICINE

## 2022-07-31 PROCEDURE — 96367 TX/PROPH/DG ADDL SEQ IV INF: CPT | Performed by: EMERGENCY MEDICINE

## 2022-07-31 PROCEDURE — 74178 CT ABD&PLV WO CNTR FLWD CNTR: CPT

## 2022-07-31 RX ORDER — SODIUM CHLORIDE, SODIUM GLUCONATE, SODIUM ACETATE, POTASSIUM CHLORIDE AND MAGNESIUM CHLORIDE 526; 502; 368; 37; 30 MG/100ML; MG/100ML; MG/100ML; MG/100ML; MG/100ML
1000 INJECTION, SOLUTION INTRAVENOUS ONCE
Status: COMPLETED | OUTPATIENT
Start: 2022-07-31 | End: 2022-07-31

## 2022-07-31 RX ORDER — FUROSEMIDE 10 MG/ML
40 INJECTION INTRAMUSCULAR; INTRAVENOUS ONCE
Status: COMPLETED | OUTPATIENT
Start: 2022-07-31 | End: 2022-07-31

## 2022-07-31 RX ORDER — CALCIUM GLUCONATE 20 MG/ML
1 INJECTION, SOLUTION INTRAVENOUS ONCE
Status: COMPLETED | OUTPATIENT
Start: 2022-07-31 | End: 2022-07-31

## 2022-07-31 RX ORDER — CEFTRIAXONE 1 G/1
1 INJECTION, POWDER, FOR SOLUTION INTRAMUSCULAR; INTRAVENOUS ONCE
Status: COMPLETED | OUTPATIENT
Start: 2022-07-31 | End: 2022-07-31

## 2022-07-31 RX ORDER — SODIUM CHLORIDE, SODIUM GLUCONATE, SODIUM ACETATE, POTASSIUM CHLORIDE AND MAGNESIUM CHLORIDE 526; 502; 368; 37; 30 MG/100ML; MG/100ML; MG/100ML; MG/100ML; MG/100ML
1000 INJECTION, SOLUTION INTRAVENOUS ONCE
Status: DISCONTINUED | OUTPATIENT
Start: 2022-07-31 | End: 2022-07-31

## 2022-07-31 RX ORDER — IOPAMIDOL 755 MG/ML
101 INJECTION, SOLUTION INTRAVASCULAR ONCE
Status: COMPLETED | OUTPATIENT
Start: 2022-07-31 | End: 2022-07-31

## 2022-07-31 RX ORDER — NICOTINE POLACRILEX 4 MG
15-30 LOZENGE BUCCAL
Status: DISCONTINUED | OUTPATIENT
Start: 2022-07-31 | End: 2022-08-03 | Stop reason: HOSPADM

## 2022-07-31 RX ORDER — ALBUTEROL SULFATE 0.83 MG/ML
10 SOLUTION RESPIRATORY (INHALATION) ONCE
Status: COMPLETED | OUTPATIENT
Start: 2022-07-31 | End: 2022-07-31

## 2022-07-31 RX ORDER — DEXTROSE MONOHYDRATE 25 G/50ML
25-50 INJECTION, SOLUTION INTRAVENOUS
Status: DISCONTINUED | OUTPATIENT
Start: 2022-07-31 | End: 2022-08-03 | Stop reason: HOSPADM

## 2022-07-31 RX ORDER — SODIUM CHLORIDE, SODIUM GLUCONATE, SODIUM ACETATE, POTASSIUM CHLORIDE AND MAGNESIUM CHLORIDE 526; 502; 368; 37; 30 MG/100ML; MG/100ML; MG/100ML; MG/100ML; MG/100ML
INJECTION, SOLUTION INTRAVENOUS CONTINUOUS
Status: DISCONTINUED | OUTPATIENT
Start: 2022-07-31 | End: 2022-07-31

## 2022-07-31 RX ORDER — NICOTINE POLACRILEX 4 MG
15-30 LOZENGE BUCCAL
Status: DISCONTINUED | OUTPATIENT
Start: 2022-07-31 | End: 2022-07-31

## 2022-07-31 RX ORDER — DEXTROSE MONOHYDRATE 25 G/50ML
25 INJECTION, SOLUTION INTRAVENOUS ONCE
Status: COMPLETED | OUTPATIENT
Start: 2022-07-31 | End: 2022-07-31

## 2022-07-31 RX ORDER — OCTREOTIDE ACETATE 50 UG/ML
50 INJECTION, SOLUTION INTRAVENOUS; SUBCUTANEOUS ONCE
Status: COMPLETED | OUTPATIENT
Start: 2022-07-31 | End: 2022-07-31

## 2022-07-31 RX ORDER — SODIUM CHLORIDE, SODIUM LACTATE, POTASSIUM CHLORIDE, CALCIUM CHLORIDE 600; 310; 30; 20 MG/100ML; MG/100ML; MG/100ML; MG/100ML
INJECTION, SOLUTION INTRAVENOUS CONTINUOUS
Status: DISCONTINUED | OUTPATIENT
Start: 2022-07-31 | End: 2022-07-31

## 2022-07-31 RX ORDER — DEXTROSE MONOHYDRATE 25 G/50ML
25-50 INJECTION, SOLUTION INTRAVENOUS
Status: DISCONTINUED | OUTPATIENT
Start: 2022-07-31 | End: 2022-07-31

## 2022-07-31 RX ORDER — ONDANSETRON 2 MG/ML
4 INJECTION INTRAMUSCULAR; INTRAVENOUS EVERY 30 MIN PRN
Status: DISCONTINUED | OUTPATIENT
Start: 2022-07-31 | End: 2022-08-03 | Stop reason: HOSPADM

## 2022-07-31 RX ADMIN — CEFTRIAXONE SODIUM 1 G: 1 INJECTION, POWDER, FOR SOLUTION INTRAMUSCULAR; INTRAVENOUS at 14:23

## 2022-07-31 RX ADMIN — SODIUM CHLORIDE, POTASSIUM CHLORIDE, SODIUM LACTATE AND CALCIUM CHLORIDE 1000 ML: 600; 310; 30; 20 INJECTION, SOLUTION INTRAVENOUS at 20:29

## 2022-07-31 RX ADMIN — ONDANSETRON 4 MG: 2 INJECTION INTRAMUSCULAR; INTRAVENOUS at 13:58

## 2022-07-31 RX ADMIN — SODIUM CHLORIDE, SODIUM GLUCONATE, SODIUM ACETATE, POTASSIUM CHLORIDE AND MAGNESIUM CHLORIDE: 526; 502; 368; 37; 30 INJECTION, SOLUTION INTRAVENOUS at 17:22

## 2022-07-31 RX ADMIN — SODIUM CHLORIDE, POTASSIUM CHLORIDE, SODIUM LACTATE AND CALCIUM CHLORIDE 1000 ML: 600; 310; 30; 20 INJECTION, SOLUTION INTRAVENOUS at 20:45

## 2022-07-31 RX ADMIN — ONDANSETRON 4 MG: 2 INJECTION INTRAMUSCULAR; INTRAVENOUS at 15:53

## 2022-07-31 RX ADMIN — PANTOPRAZOLE SODIUM 80 MG: 40 INJECTION, POWDER, FOR SOLUTION INTRAVENOUS at 13:58

## 2022-07-31 RX ADMIN — SODIUM CHLORIDE, POTASSIUM CHLORIDE, SODIUM LACTATE AND CALCIUM CHLORIDE 1000 ML: 600; 310; 30; 20 INJECTION, SOLUTION INTRAVENOUS at 20:03

## 2022-07-31 RX ADMIN — DEXTROSE MONOHYDRATE 25 G: 25 INJECTION, SOLUTION INTRAVENOUS at 16:31

## 2022-07-31 RX ADMIN — PHYTONADIONE 10 MG: 10 INJECTION, EMULSION INTRAMUSCULAR; INTRAVENOUS; SUBCUTANEOUS at 18:12

## 2022-07-31 RX ADMIN — SODIUM CHLORIDE, SODIUM GLUCONATE, SODIUM ACETATE, POTASSIUM CHLORIDE AND MAGNESIUM CHLORIDE 1000 ML: 526; 502; 368; 37; 30 INJECTION, SOLUTION INTRAVENOUS at 16:17

## 2022-07-31 RX ADMIN — ALBUTEROL SULFATE 10 MG: 2.5 SOLUTION RESPIRATORY (INHALATION) at 16:05

## 2022-07-31 RX ADMIN — DEXTROSE MONOHYDRATE 300 ML: 100 INJECTION, SOLUTION INTRAVENOUS at 16:52

## 2022-07-31 RX ADMIN — OCTREOTIDE ACETATE 50 MCG/HR: 200 INJECTION, SOLUTION INTRAVENOUS; SUBCUTANEOUS at 15:25

## 2022-07-31 RX ADMIN — CALCIUM GLUCONATE 1 G: 20 INJECTION, SOLUTION INTRAVENOUS at 16:19

## 2022-07-31 RX ADMIN — PANTOPRAZOLE SODIUM 8 MG/HR: 40 INJECTION, POWDER, FOR SOLUTION INTRAVENOUS at 14:06

## 2022-07-31 RX ADMIN — FUROSEMIDE 40 MG: 10 INJECTION, SOLUTION INTRAVENOUS at 15:58

## 2022-07-31 RX ADMIN — NOREPINEPHRINE BITARTRATE 0.03 MCG/KG/MIN: 1 INJECTION, SOLUTION, CONCENTRATE INTRAVENOUS at 22:36

## 2022-07-31 RX ADMIN — SODIUM BICARBONATE 50 MEQ: 84 INJECTION, SOLUTION INTRAVENOUS at 16:11

## 2022-07-31 RX ADMIN — OCTREOTIDE ACETATE 50 MCG: 50 INJECTION, SOLUTION INTRAVENOUS; SUBCUTANEOUS at 15:34

## 2022-07-31 RX ADMIN — HUMAN INSULIN 7 UNITS: 100 INJECTION, SOLUTION SUBCUTANEOUS at 16:38

## 2022-07-31 RX ADMIN — SODIUM CHLORIDE, POTASSIUM CHLORIDE, SODIUM LACTATE AND CALCIUM CHLORIDE: 600; 310; 30; 20 INJECTION, SOLUTION INTRAVENOUS at 13:31

## 2022-07-31 RX ADMIN — IOPAMIDOL 101 ML: 755 INJECTION, SOLUTION INTRAVENOUS at 15:09

## 2022-07-31 ASSESSMENT — ACTIVITIES OF DAILY LIVING (ADL)
ADLS_ACUITY_SCORE: 35
ADLS_ACUITY_SCORE: 37
ADLS_ACUITY_SCORE: 37
ADLS_ACUITY_SCORE: 35

## 2022-07-31 NOTE — ED PROVIDER NOTES
History     Chief Complaint   Patient presents with     Hematemesis     HPI  Gilles Malloy is a 62 year old male with a past medical history of alcoholic cirrhosis of the liver with esophageal varices who presents to the emergency department with a chief complaint of hematemesis.  The patient was brought in by EMS from home.  He reports approximately 1 L of dark red bloody emesis as well as black tarry stools which started on Friday and worsened this morning.  Associated with dizziness.  He states as a result, he did fall and land on his back.  No major injuries, he states it just feels bruised.  Pain is in his lower back only.  He denies any head or neck trauma or pain.  No loss of consciousness.  The patient has a history of liver failure and has had bleeding from esophageal varices before, and this is similar.  He is noted to be slightly jaundiced.  EMS reported his initial systolic blood pressure was in the 70s.  He was given 750 mL of normal saline prior to arrival and systolic blood pressure improved to 90s.  Patient was able to stand with assistance from EMS.  Patient noted to appear pale on arrival. He is not on blood thinners.  He is allergic to penicillin, his allergy reaction is itching only.  He is not on any blood thinners.  He denies any abdominal pain currently.  No bleeding from other sites.    The patient notes he has recently had procedures for a dental implant.  He is on 2 antibiotics for this (moxifloxacin and Flagyl).  He did have some bleeding from the dental site which he thinks he swallowed.  No other new medication changes.    I have reviewed the Medications, Allergies, Past Medical and Surgical History, and Social History in the [employer] system.    Past Medical History:   Diagnosis Date     Alcoholic liver disease (H) 2010    Pt has cut down on alcoholic intake and has not needed a paracentesis since 2016 or 2017     Alcoholism (H)      Breast cancer (H) 11/2018    Lt sided     Past  Surgical History:   Procedure Laterality Date     CHOLECYSTECTOMY       COLONOSCOPY N/A 7/15/2021    Procedure: Colonoscopy, With Polypectomy And Biopsy;  Surgeon: Matias García MD;  Location: [facility]     HERNIA REPAIR      x 2     MANDIBLE SURGERY      Dental jaw surgery 2008     MASTECTOMY Left 11/21/2018     RECONSTRUCT NOSE AND SEPTUM (FUNCTIONAL)       No current facility-administered medications for this encounter.     Current Outpatient Medications   Medication     ALPRAZolam (XANAX) 0.5 MG tablet     furosemide (LASIX) 40 MG tablet     HYDROcodone-acetaminophen (NORCO)  MG per tablet     multivitamin, therapeutic (THERA-VIT) TABS tablet     spironolactone (ALDACTONE) 50 MG tablet     tamoxifen (NOLVADEX) 20 MG tablet     Allergies   Allergen Reactions     Penicillins      Sulfa Drugs      Past medical history, past surgical history, medications, and allergies were reviewed with the patient. Additional pertinent items: None    Social History     Socioeconomic History     Marital status: Single     Spouse name: Not on file     Number of children: Not on file     Years of education: Not on file     Highest education level: Not on file   Occupational History     Not on file   Tobacco Use     Smoking status: Current Every Day Smoker     Packs/day: 0.75     Smokeless tobacco: Never Used   Substance and Sexual Activity     Alcohol use: Yes     Comment: daily 2-4 beers or couple glasses of wine     Drug use: Not on file     Sexual activity: Not on file   Other Topics Concern     Not on file   Social History Narrative     Not on file     Social Determinants of Health     Financial Resource Strain: Not on file   Food Insecurity: Not on file   Transportation Needs: Not on file   Physical Activity: Not on file   Stress: Not on file   Social Connections: Not on file   Intimate Partner Violence: Not on file   Housing Stability: Not on file     Social history was reviewed with the patient. Additional  "pertinent items: None    Review of Systems  General: No fevers or chills  Skin: No rash or diaphoresis  Eyes: No eye redness or discharge  Ears/Nose/Throat: No rhinorrhea or nasal congestion, positive for recent dental procedure  Respiratory: No cough or SOB  Cardiovascular: No chest pain or palpitations  Gastrointestinal: See HPI  Genitourinary: No urinary frequency, hematuria, or dysuria  Musculoskeletal: Positive for mild lower back pain, otherwise no new arthralgias or myalgias  Neurologic: No numbness or weakness  Hematologic/Lymphatic/Immunologic: No leg swelling, no easy bruising/bleeding  Endocrine: No polyuria/polydypsia    A complete review of systems was performed with pertinent positives and negatives noted in the HPI, and all other systems negative.    Physical Exam   BP: (!) 92/39  Pulse: 97  Temp: 98.6  F (37  C)  Resp: 20  Height: 172.7 cm (5' 8\")  Weight: 74.8 kg (165 lb)      General: Well nourished, well developed, NAD  HEENT: EOMI, NCAT  Neck: no jugular venous distension, supple, nl ROM  Cardiac: Regular rate and rhythm. No murmurs, rubs, or gallops. Normal S1, S2.  Intact peripheral pulses  Pulm: CTAB, no stridor, wheezes, rales, rhonchi  Abd: Soft, nontender, nondistended.  No masses palpated.    Skin: Warm and dry to the touch.  No rash. Appears pale  Extremities: No LE edema, no cyanosis, w/w/p  Neuro: A&Ox3, no gross focal deficits    ED Course        Procedures               EKG Interpretation:      Interpreted by Mary Sanchez MD  Time reviewed: 1335  Symptoms at time of EKG: Dizziness  Rhythm: normal sinus   Rate: normal, 80 bpm  Axis: NORMAL  Ectopy: none  Conduction: normal  ST Segments/ T Waves: No ST-T wave changes  Q Waves: none  Comparison to prior: No old EKG available    Clinical Impression: normal EKG           Critical Care Addendum    My initial assessment, based on my review of prehospital provider report, review of nursing observations, review of vital signs, focused " history, physical exam and interpretation of labs and imaging , established that Gilles Malloy has acute GI bleeding with anemia requiring blood transfusion, severe hypotension, lactic acidosis, significant hyperkalemia, and acute kidney injury which requires immediate intervention, and therefore he is critically ill.     After the initial assessment, the care team initiated multiple lab tests, initiated IV fluid administration, initiated medication therapy with Insulin/dextrose/calcium gluconate/sodium bicarb/IV fluids/Lasix/octreotide/Protonix/Rocephin and consulted with Gastroenterology, nephrology, and critical care to provide stabilization care. Due to the critical nature of this patient, I reassessed nursing observations, vital signs, physical exam, review of cardiac rhythm monitor, interpretation of Labs and imaging, mental status, neurologic status and respiratory status multiple times prior to his disposition.     Time also spent performing documentation, reviewing test results, discussion with consultants and coordination of care.     Critical care time (excluding teaching time and procedures): 75 minutes.             Labs Ordered and Resulted from Time of ED Arrival to Time of ED Departure   INR - Abnormal       Result Value    INR 2.31 (*)    PARTIAL THROMBOPLASTIN TIME - Abnormal    aPTT 44 (*)    COMPREHENSIVE METABOLIC PANEL - Abnormal    Sodium 132 (*)     Potassium 7.0 (*)     Creatinine 3.61 (*)     Urea Nitrogen 73.3 (*)     Chloride 98      Carbon Dioxide (CO2) 18 (*)     Anion Gap 16 (*)     Glucose 86      Calcium 8.7 (*)     Protein Total 5.1 (*)     Albumin 2.5 (*)     Bilirubin Total 1.9 (*)     Alkaline Phosphatase 52       (*)     ALT 71 (*)     GFR Estimate 18 (*)    LACTIC ACID WHOLE BLOOD - Abnormal    Lactic Acid 7.3 (*)    ETHYL ALCOHOL LEVEL - Abnormal    Alcohol ethyl <0.01 (*)    ROUTINE UA WITH MICROSCOPIC REFLEX TO CULTURE - Abnormal    Color Urine Yellow       Appearance Urine Slightly Cloudy (*)     Glucose Urine Negative      Bilirubin Urine Negative      Ketones Urine Trace (*)     Specific Gravity Urine 1.018      Blood Urine Trace (*)     pH Urine 5.0      Protein Albumin Urine Negative      Urobilinogen Urine Normal      Nitrite Urine Negative      Leukocyte Esterase Urine Trace (*)     Bacteria Urine Few (*)     Mucus Urine Present (*)     RBC Urine <1      WBC Urine 6 (*)     Squamous Epithelials Urine 1      Hyaline Casts Urine 8 (*)    CBC WITH PLATELETS AND DIFFERENTIAL - Abnormal    WBC Count 12.0 (*)     RBC Count 1.68 (*)     Hemoglobin 5.7 (*)     Hematocrit 18.0 (*)      (*)     MCH 33.9 (*)     MCHC 31.7      RDW 14.8      Platelet Count 81 (*)     % Neutrophils 85      % Lymphocytes 7      % Monocytes 7      % Eosinophils 0      % Basophils 0      % Immature Granulocytes 1      NRBCs per 100 WBC 0      Absolute Neutrophils 10.1 (*)     Absolute Lymphocytes 0.9      Absolute Monocytes 0.9      Absolute Eosinophils 0.0      Absolute Basophils 0.0      Absolute Immature Granulocytes 0.1      Absolute NRBCs 0.0     POTASSIUM - Abnormal    Potassium 7.0 (*)    GLUCOSE BY METER - Abnormal    GLUCOSE BY METER POCT 109 (*)    LIPASE - Normal    Lipase 59     COVID-19 VIRUS (CORONAVIRUS) BY PCR - Normal    SARS CoV2 PCR Negative     AMMONIA - Normal    Ammonia 23     AMMONIA   LACTIC ACID WHOLE BLOOD   GLUCOSE MONITOR NURSING POCT   POTASSIUM   TYPE AND SCREEN, ADULT    ABO/RH(D) O POS      Antibody Screen Negative      SPECIMEN EXPIRATION DATE [id number]     PREPARE RED BLOOD CELLS (UNIT)    CROSSMATCH Compatible      UNIT ABO/RH O Pos      Unit Number [id number]      Unit Status Issued      Blood Component Type Red Blood Cells      Product Code Q1653O35      CODING SYSTEM [id number]      UNIT TYPE ISBT 5100      ISSUE DATE AND TIME [id number]     PREPARE RED BLOOD CELLS (UNIT)    CROSSMATCH Compatible      UNIT ABO/RH O Pos      Unit Number  [id number]      Unit Status Issued      Blood Component Type Red Blood Cells      Product Code Q6010A36      CODING SYSTEM [id number]      UNIT TYPE ISBT 5100      ISSUE DATE AND TIME [id number]     PREPARE RED BLOOD CELLS (UNIT)   BLOOD CULTURE   BLOOD CULTURE   TRANSFUSE RED BLOOD CELLS (UNIT)   TRANSFUSE RED BLOOD CELLS (UNIT)   ABO/RH TYPE AND SCREEN            Results for orders placed or performed during the hospital encounter of 07/31/22 (from the past 24 hour(s))   CBC with platelets differential    Narrative    The following orders were created for panel order CBC with platelets differential.  Procedure                               Abnormality         Status                     ---------                               -----------         ------                     CBC with platelets and d...[[id number]]  Abnormal            Final result                 Please view results for these tests on the individual orders.   ABO/Rh type and screen    Narrative    The following orders were created for panel order ABO/Rh type and screen.  Procedure                               Abnormality         Status                     ---------                               -----------         ------                     Adult Type and Screen[[id number]]                            Final result                 Please view results for these tests on the individual orders.   Comprehensive metabolic panel   Result Value Ref Range    Sodium 132 (L) 136 - 145 mmol/L    Potassium 7.0 (HH) 3.4 - 5.3 mmol/L    Creatinine 3.61 (H) 0.67 - 1.17 mg/dL    Urea Nitrogen 73.3 (H) 8.0 - 23.0 mg/dL    Chloride 98 98 - 107 mmol/L    Carbon Dioxide (CO2) 18 (L) 22 - 29 mmol/L    Anion Gap 16 (H) 7 - 15 mmol/L    Glucose 86 70 - 99 mg/dL    Calcium 8.7 (L) 8.8 - 10.2 mg/dL    Protein Total 5.1 (L) 6.4 - 8.3 g/dL    Albumin 2.5 (L) 3.5 - 5.2 g/dL    Bilirubin Total 1.9 (H) <=1.2 mg/dL    Alkaline Phosphatase 52 40 - 129 U/L     (H) 10 - 50 U/L     ALT 71 (H) 10 - 50 U/L    GFR Estimate 18 (L) >60 mL/min/1.73m2   Lipase   Result Value Ref Range    Lipase 59 13 - 60 U/L   Lactic acid whole blood   Result Value Ref Range    Lactic Acid 7.3 (HH) 0.7 - 2.0 mmol/L   Ethyl Alcohol Level   Result Value Ref Range    Alcohol ethyl <0.01 (H) <=0.00 g/dL   [place] Draw    Narrative    The following orders were created for panel order [place] Draw.  Procedure                               Abnormality         Status                     ---------                               -----------         ------                     Extra Blue Top Tube[[id number]]                              Final result               Extra Red Top Tube[[id number]]                               Final result               Extra Green Top (Lithium...[[id number]]                      Final result               Extra Purple Top Tube[[id number]]                            Final result               Extra Purple Top Tube[[id number]]                                                         Please view results for these tests on the individual orders.   CBC with platelets and differential   Result Value Ref Range    WBC Count 12.0 (H) 4.0 - 11.0 10e3/uL    RBC Count 1.68 (L) 4.40 - 5.90 10e6/uL    Hemoglobin 5.7 (LL) 13.3 - 17.7 g/dL    Hematocrit 18.0 (L) 40.0 - 53.0 %     (H) 78 - 100 fL    MCH 33.9 (H) 26.5 - 33.0 pg    MCHC 31.7 31.5 - 36.5 g/dL    RDW 14.8 10.0 - 15.0 %    Platelet Count 81 (L) 150 - 450 10e3/uL    % Neutrophils 85 %    % Lymphocytes 7 %    % Monocytes 7 %    % Eosinophils 0 %    % Basophils 0 %    % Immature Granulocytes 1 %    NRBCs per 100 WBC 0 <1 /100    Absolute Neutrophils 10.1 (H) 1.6 - 8.3 10e3/uL    Absolute Lymphocytes 0.9 0.8 - 5.3 10e3/uL    Absolute Monocytes 0.9 0.0 - 1.3 10e3/uL    Absolute Eosinophils 0.0 0.0 - 0.7 10e3/uL    Absolute Basophils 0.0 0.0 - 0.2 10e3/uL    Absolute Immature Granulocytes 0.1 <=0.4 10e3/uL    Absolute NRBCs 0.0 10e3/uL   Adult Type and  Screen   Result Value Ref Range    ABO/RH(D) O POS     Antibody Screen Negative Negative    SPECIMEN EXPIRATION DATE [id number]    Extra Green Top (Lithium Heparin) Tube   Result Value Ref Range    Hold Specimen JIC    Extra Purple Top Tube   Result Value Ref Range    Hold Specimen JIC    Ammonia (on ice)   Result Value Ref Range    Ammonia 23 16 - 60 umol/L   Potassium   Result Value Ref Range    Potassium 7.0 (HH) 3.4 - 5.3 mmol/L   INR   Result Value Ref Range    INR 2.31 (H) 0.85 - 1.15   Partial thromboplastin time   Result Value Ref Range    aPTT 44 (H) 22 - 38 Seconds   Extra Blue Top Tube   Result Value Ref Range    Hold Specimen     Extra Red Top Tube   Result Value Ref Range    Hold Specimen JIC    Asymptomatic COVID-19 Virus (Coronavirus) by PCR Nasopharyngeal    Specimen: Nasopharyngeal; Swab   Result Value Ref Range    SARS CoV2 PCR Negative Negative, Testing sent to reference lab. Results will be returned via unsolicited result    Narrative    Testing was performed using the Xpert Xpress SARS-CoV-2 Assay on the  Cepheid Gene-Xpert Instrument Systems. Additional information about  this Emergency Use Authorization (EUA) assay can be found via the Lab  Guide. This test should be ordered for the detection of SARS-CoV-2 in  individuals who meet SARS-CoV-2 clinical and/or epidemiological  criteria. Test performance is unknown in asymptomatic patients. This  test is for in vitro diagnostic use under the FDA EUA for  laboratories certified under CLIA to perform high complexity testing.  This test has not been FDA cleared or approved. A negative result  does not rule out the presence of PCR inhibitors in the specimen or  target RNA in concentration below the limit of detection for the  assay. The possibility of a false negative should be considered if  the patient's recent exposure or clinical presentation suggests  COVID-19. This test was validated by the [facility]. This laboratory is certified under  the Clinical Laboratory Improvement Amendments of 1988 (CLIA-88) as  qualified to perform high complexity laboratory testing.     Prepare red blood cells (unit)   Result Value Ref Range    CROSSMATCH Compatible     UNIT ABO/RH O Pos     Unit Number [id number]     Unit Status Issued     Blood Component Type Red Blood Cells     Product Code J9504E06     CODING SYSTEM MDEY510     UNIT TYPE ISBT 5100     ISSUE DATE AND TIME [id number]    Prepare red blood cells (unit)   Result Value Ref Range    CROSSMATCH Compatible     UNIT ABO/RH O Pos     Unit Number [id number]     Unit Status Issued     Blood Component Type Red Blood Cells     Product Code V1364Q96     CODING SYSTEM QXKJ621     UNIT TYPE ISBT 5100     ISSUE DATE AND TIME [id number]    UA with Microscopic reflex to Culture    Specimen: Urine, Midstream   Result Value Ref Range    Color Urine Yellow Colorless, Straw, Light Yellow, Yellow    Appearance Urine Slightly Cloudy (A) Clear    Glucose Urine Negative Negative mg/dL    Bilirubin Urine Negative Negative    Ketones Urine Trace (A) Negative mg/dL    Specific Gravity Urine 1.018 1.003 - 1.035    Blood Urine Trace (A) Negative    pH Urine 5.0 5.0 - 7.0    Protein Albumin Urine Negative Negative mg/dL    Urobilinogen Urine Normal Normal, 2.0 mg/dL    Nitrite Urine Negative Negative    Leukocyte Esterase Urine Trace (A) Negative    Bacteria Urine Few (A) None Seen /HPF    Mucus Urine Present (A) None Seen /LPF    RBC Urine <1 <=2 /HPF    WBC Urine 6 (H) <=5 /HPF    Squamous Epithelials Urine 1 <=1 /HPF    Hyaline Casts Urine 8 (H) <=2 /LPF    Narrative    Urine Culture not indicated   CT Abdomen Pelvis w/o & w Contrast    Narrative    Exam: Computed tomographic angiography of the abdomen and pelvis  without and with contrast, dated 7/31/2022 3:30 PM    Clinical information:  GIB    Technique: Helical scans through the abdomen and pelvis obtained  before  the administration of intravenous contrast media and following  the injection of contrast media in the late arterial and delayed  phase.     Contrast: 101 mL Isovue 370    DLP: 741 mGy*cm    Comparison study: Ultrasound 6/28/2021    Findings:      The abdominal aorta is normal in caliber. Moderate atherosclerotic  disease of the abdominal aorta and its branches. Extensive  portosystemic collaterals including esophageal and gastric varices.  Circumaortic left renal vein anatomical variant.  The celiac axis, SMA and SAI are patent. The renal arteries are patent  bilaterally.   The splenic vein, portal vein, SMV and renal veins are  patent.   The hepatic veins and IVC are patent.     Abdomen and pelvis:   Cirrhotic morphology of liver. Cholecystectomy. Punctate  calcifications within the head of the pancreas. Spleen and adrenal  glands appear within normal limits. Bilateral renal cysts. Normal  caliber small and large bowel. Urinary bladder is unremarkable. Pelvic  phleboliths. No free fluid or free air. No lymphadenopathy.    Lower thorax: Minimal bibasilar dependent atelectasis, otherwise clear  lung bases.    Bones and soft tissues: Soft tissue stranding about the umbilicus. No  acute or suspicious osseous abnormality.      Impression    Impression:  Hepatic cirrhosis with evidence of portal hypertension including  extensive portosystemic collaterals of esophageal and gastroesophageal  varices. No evidence of active extravasation. However esophageal  varices are not fully visualized. Can consider upper endoscopy for  continued clinical concern of ruptured esophageal varices.    I have personally reviewed the examination and initial interpretation  and I agree with the findings.    JAYCEE RODRIGUEZ MD         SYSTEM ID:  [id number]   Glucose by meter   Result Value Ref Range    GLUCOSE BY METER POCT 109 (H) 70 - 99 mg/dL       Labs, vital signs, and imaging studies were reviewed by me.    Medications   ondansetron  (ZOFRAN) injection 4 mg (4 mg Intravenous Given 7/31/22 1553)   pantoprazole (PROTONIX) 80 mg in sodium chloride 0.9 % 100 mL infusion (8 mg/hr Intravenous Rate/Dose Verify 7/31/22 1620)   octreotide (sandoSTATIN) 1,250 mcg in D5W 250 mL (50 mcg/hr Intravenous New Bag 7/31/22 1525)   glucose gel 15-30 g (has no administration in time range)     Or   dextrose 50 % injection 25-50 mL (has no administration in time range)     Or   glucagon injection 1 mg (has no administration in time range)   dextrose 10% BOLUS (300 mLs Intravenous New Bag 7/31/22 1652)   calcium gluconate 1 g in 50 mL sodium chloride intermittent infusion (premix) (1 g Intravenous Given 7/31/22 1619)   Plasma-Lyte A (electrolyte A) infusion (has no administration in time range)   phytonadione 10 mg in sodium chloride 0.9 % 50 mL intermittent infusion (has no administration in time range)   pantoprazole (PROTONIX) IV push injection 80 mg (80 mg Intravenous Given 7/31/22 1358)   octreotide (sandoSTATIN) injection 50 mcg (50 mcg Intravenous Given 7/31/22 1534)   cefTRIAXone (ROCEPHIN) 1 g vial to attach to  mL bag for ADULTS or NS 50 mL bag for PEDS (0 g Intravenous Stopped 7/31/22 1521)   iopamidol (ISOVUE-370) solution 101 mL (101 mLs Intravenous Given 7/31/22 1509)   sodium chloride (PF) 0.9% PF flush 84 mL (84 mLs Intravenous Given 7/31/22 1510)   dextrose 50 % injection 25 g (25 g Intravenous Given 7/31/22 1631)   insulin regular 1 unit/mL injection 7 Units (7 Units Intravenous Given 7/31/22 1638)   albuterol (PROVENTIL) neb solution 10 mg (10 mg Nebulization Given 7/31/22 1605)   sodium bicarbonate 8.4 % injection 50 mEq (50 mEq Intravenous Given 7/31/22 1611)   furosemide (LASIX) injection 40 mg (40 mg Intravenous Given 7/31/22 1558)   Plasma-Lyte A (electrolyte A) BOLUS 1,000 mL (1,000 mLs Intravenous Given 7/31/22 1617)       Assessments & Plan (with Medical Decision Making)   Gilles MOORE Mellissa is a 62 year old male who presents with GI  bleeding.  Blood pressure 92/39 on arrival to the emergency department.  IV fluids started by EMS, continued on arrival. Labs, CT of the abdomen pelvis ordered to further evaluate the patient.  Patient to have 2 large-bore IVs placed and being placed on the monitor.  Will initiate Protonix, octreotide, and Rocephin for presumptive variceal bleed.  Type and screen was sent.  Risks and benefits of blood transfusion were discussed with the patient and he consents to blood transfusion if it becomes necessary.  Patient was given an additional 500 cc bolus of LR when he arrived to the emergency department, followed by infusion at 100 cc/h.    EKG is normal.    Laboratory work-up is remarkable for hemoglobin of 5.7.  2 units of PRBCs were ordered.  Additionally, patient's lactate is elevated.  IV fluids have been initiated. CBC shows WBC 12.0. Platelet 81.    Ammonia within normal limits.  AST and ALT are slightly elevated at 129 and 71 respectively.  Bilirubin is 1.9.    CMP remarkable for elevated creatinine to 3.61 (according to chart review, patient's baseline appears to be around 0.8).  Potassium was 7.0.  BUN 73.3. Anion gap 16, CO2 18. Bicarb, calcium gluconate, albuterol, insulin/dextrose were ordered.  Patient was discussed with nephrology, who recommended changing fluids from LR to a different IV fluids.  Plasma-Lyte was ordered.They also recommend Lasix.    Will place Peraza catheter and have patient on strict I/Os.    CT shows esophageal varices are present, but no obvious source of active bleeding    Patient was discussed with gastroenterology, they will come to the emergency department to evaluate the patient.    I have reviewed the nursing notes.    I have reviewed the findings, diagnosis, plan and need for follow up with the patient.    Patient discussed with ICU team, to be admitted to their service for further management.  They recommend giving vitamin K IV to the patient due to his elevated INR.  Plan was  discussed with patient who understands and agrees with plan.     Patient was discussed with gastroenterology, they have seen the patient in the emergency department and no plan to scope the patient today, will likely perform endoscopy tomorrow.  Advised primary team to call if patient's clinical condition changes.    New Prescriptions    No medications on file       Final diagnoses:   Hypotension, unspecified hypotension type   Gastrointestinal hemorrhage with hematemesis   Acute kidney injury (H)   Hyperkalemia     Mary Sanchez MD  7/31/2022   [facility] EMERGENCY DEPARTMENT     Mary Sanchez MD  07/31/22 1710       Mary Sanchez MD  07/31/22 [id number]

## 2022-07-31 NOTE — ED TRIAGE NOTES
Pt presents via EMS from home reports of approx 1L BR bloody emesis noted in bucket near pt and black tarry stools onset Friday worse this morning, pt reports dizziness. PMHx liver failure with esophageal varices, slight jaundice noted. EMS reports initial sbp in 70s, PTA 750ml NS sbp to 90s. Pt able to stand with assistance per ems, pt AxXO4, RR e/u, skin pale, mucous membranes pale.

## 2022-07-31 NOTE — ED NOTES
Bed: ED28  Expected date: 7/31/22  Expected time: 12:56 PM  Means of arrival: Ambulance  Comments:  H 421  62M  Hypotension, esophageal varices

## 2022-07-31 NOTE — H&P
MEDICAL ICU H&P  07/31/2022    Date of Hospital Admission: 7/31/22  Date of ICU Admission: 7/31/22  Reason for Critical Care Admission: Hemoptysis  Date of Service (when I saw the patient): 07/31/2022    ASSESSMENT: Gilles Malloy is a 62 year old male with PMH of decompensated ETOH cirrhosis c/b esophageal varices and ascites who was admitted on 7/31/2022 for severe hypotension resulting from hematemesis and melena 2/2 bleeding varices, admitted to the MICU for hypotension and concern for evolution into hemorrhagic shock.    PLAN:    Neuro:  # Pain and sedation  - No pain on admission.  Max tylenol 2g, given cirrhosis.  - Held PTA norco, given NPO status    # Asterixis, mild  Noted on admission.  No other neurologic deficits.  Etiology most likely 2/2 HE, given decompensated cirrhosis and active drinking.  However, given acute kidney failure, possible there is also a uremic component (BUN 75 on admission).  Reassuringly, patient Ox4, without other neurological deficits.  - Nephrology consulted, appreciate recommendations    #Concern for alcohol withdrawal  Patient still active drinker - drinks two bottles of wine/week.  Last drink yesterday (Saturday).  No signs of alcohol intoxication or withdrawal on exam.  No history of alcohol withdrawal.  - [facility] protocol    Pulmonology  - No active issues    Cardiovascular:  #Hemorrhagic shock 2/2 upper GIB  Presented to ED with 2 episodes of coffee-ground emesis, concerning for upper GIB.  Etiology 2/2 portal gastropathy vs PUD vs known esophageal varices.  Less likely 2/2 esophageal varices, given without bright red blood.  Also possible component of hypovolemia, given patient with decreased oral intake over last several days, and on furosemide and spironolactone for his ascites.  Blood pressure 70s/30s upon admission, improved to 100/54 after 2L crystalloids.  Lactate 7.3 on admission, decreasing to 5.5 on recheck.    - Admit to ICU for monitoring  - Vitals q2h  -  GIB management below    GI/Nutrition:  # Upper GIB, melena in the setting of:  # Decompensated EtOH cirrhosis c/b esophageal varices  Presented to ED with 2 episodes of coffee-ground emesis, concerning for upper GIB.  Etiology 2/2 portal gastropathy vs PUD vs known esophageal varices.  Less likely 2/2 esophageal varices, given without bright red blood.  Most likely portal gastropathy  - Hepatology consulted, appreciate recs  - 2 large bore IVs ordered  - Protonix gtt  - Octreotide gtt  - CTX 1g q24h  - EGD tomorrow  - NPO  - Hgb transfuse <7     #Decompensated ETOH cirrhosis c/b esophageal varices, ascites  No fluid wave seen on exam, though patient with history of ascites.  - RUQ US to eval for ascites  - Will consider diagnostic paracentesis, based on clinical course  - CTX, as above  -Hold PTA spironolactone, furosemide    Renal/Fluids/Electrolytes:  # Acute kidney failure  # Hypovolemic hyponatremia  Baseline Cr ~0.8, presented with creatinine of 3.61 - likely multifactorial, including GIB, hypovolemia (decreased oral intake), PTA diuretics.  Still self-reported good urine output.   - Given 2L crystaloid in ED  - Additional 1L plasma-lyte ordered  - Nephrology consulted, appreciate recs  - Peraza ordered  - Follow strict I/Os, daily weights    # Hyperkalemia  K+ 7 on admission. Shifted with insulin, high dose albuterol, and lasix, now down to 5.  No peaked t waves seen   - telemetry   - BMP q6h - shift as appropriate     Endocrine:  -no active issues  - POCT glucose while NPO  - Hypoglycemia protocol    ID:  - No active issues    Hematology:    # Acute blood loss anemia  Hgb 5.7 on admission, s/p 2 units of pRBCs.  - Hgb checks q6h overnight    # Malignant neoplasm of breast, s/p left mastectomy 2019  - PTA tamoxifen - HELD given NPO status    Musculoskeletal:  No active issues  - PT/OT consulted    Skin:  No active issues    General Cares/Prophylaxis:    DVT Prophylaxis: VTE Prophylaxis contraindicated due to  acute bleed  GI Prophylaxis: PPI  Restraints: none  Family Communication: Emiliano   Code Status: Full Code    Lines/tubes/drains:  - PIV x3    Disposition:  - Medical ICU    Patient seen and findings/plan discussed with medical ICU staff, Dr. Gaxiola.    OSCAR Nobles, PGY3  Internal Medicine  [facility]  -----------------------------------------------------------------------    HISTORY PRESENTING ILLNESS: Brannon Malloy is a 61 yo male with PMH of alcoholic cirrhosis with esophageal varices who presented to the ED via EMS, reporting hematemesis and melena that started on Friday (7/29) and worsened today. Early this morning, he vomited ~1L of dark red blood. He had two bouts of melena per day since Friday and one bout of emesis per day. He has had bleeding varices in the past. Patient states he was very dizzy early this morning and he fell onto his back. Reports no serious injury but has mild continued pain in his lower back. No LOC, head or neck trauma or pain.   He reports that he has had bleeding varices before. The last time was Nov 2021 when his alcohol intake increased after his mother's death. He follows with GI outpatient (Dr. Larson) and last saw him in August 2021.   EMS reports initial SBP in the 70s that went up to the 90s after 750 mL NS bolus. Upon arrival to the ED, his Hgb was noted to be 5.7 and he was given 2 units pRBCs. He had one more dark red emesis of about 200mL. Currently, he states he feels significantly better than when he first came in. Denies nausea, abdominal pain, distention, dizziness, SOB, and chest pain. He has an appetite.    REVIEW OF SYSTEMS: as above. 10 point ROS negative.    PAST MEDICAL HISTORY:   Past Medical History:   Diagnosis Date     Alcoholic liver disease (H) 2010    Pt has cut down on alcoholic intake and has not needed a paracentesis since 2016 or 2017     Alcoholism (H)      Breast cancer (H) 11/2018    Lt sided     SURGICAL HISTORY:  Past Surgical  History:   Procedure Laterality Date     CHOLECYSTECTOMY       COLONOSCOPY N/A 7/15/2021    Procedure: Colonoscopy, With Polypectomy And Biopsy;  Surgeon: Matias García MD;  Location: [facility]     HERNIA REPAIR      x 2     MANDIBLE SURGERY      Dental jaw surgery 2008     MASTECTOMY Left 11/21/2018     RECONSTRUCT NOSE AND SEPTUM (FUNCTIONAL)       SOCIAL HISTORY:  Social History     Socioeconomic History     Marital status: Single     Spouse name: None     Number of children: None     Years of education: None     Highest education level: None   Tobacco Use     Smoking status: Current Every Day Smoker     Packs/day: 0.75     Smokeless tobacco: Never Used   Substance and Sexual Activity     Alcohol use: Yes     Comment: daily 2-4 beers or couple glasses of wine     Drug use: Never     FAMILY HISTORY:   Family History   Problem Relation Age of Onset     Prostate Cancer Father      Aplastic anemia Sister      ALLERGIES:   Allergies   Allergen Reactions     Penicillins      Sulfa Drugs      MEDICATIONS:  No current facility-administered medications on file prior to encounter.  ALPRAZolam (XANAX) 0.5 MG tablet,   furosemide (LASIX) 40 MG tablet, Take 2 tablets (80 mg) by mouth daily  HYDROcodone-acetaminophen (NORCO)  MG per tablet,   multivitamin, therapeutic (THERA-VIT) TABS tablet, Take 1 tablet by mouth daily  spironolactone (ALDACTONE) 50 MG tablet, Take 1 tablet (50 mg) by mouth daily  tamoxifen (NOLVADEX) 20 MG tablet, TK 1 T PO DAILY        PHYSICAL EXAMINATION:  Temp:  [98.4  F (36.9  C)-98.7  F (37.1  C)] 98.4  F (36.9  C)  Pulse:  [] 97  Resp:  [12-20] 14  BP: ()/(39-61) 92/56  SpO2:  [99 %-100 %] 99 %  General: alert, well-appearing, sitting up in bed and comfortable, tremulous.  Dry mucous membranes  HEENT: Pale conjunctiva and oral mucosa, new dental implants. Pupils equal and round. No scleral icterus.  Neuro: A&Ox3, NAD. Asterixis present, mild  Pulm/Resp: Clear breath sounds  bilaterally without rhonchi, crackles or wheeze, breathing non-labored, no use of accessory muscles  CV: RRR, no murmurs, rubs or gallops  Abdomen: Soft, non-tender to deep palpation, appears slightly distended but at baseline, according to patient. BS+  : Bedside urinal with 300mL urine, yellow and clear  Incisions/Skin: No lower extremity edema.     LABS: Reviewed.   Arterial Blood Gases   No lab results found in last 7 days.  Complete Blood Count   Recent Labs   Lab 07/31/22  1349   WBC 12.0*   HGB 5.7*   PLT 81*     Basic Metabolic Panel  Recent Labs   Lab 07/31/22  1631 07/31/22  1349   NA  --  132*   POTASSIUM  --  7.0*  7.0*   CHLORIDE  --  98   CO2  --  18*   BUN  --  73.3*   CR  --  3.61*   * 86     Liver Function Tests  Recent Labs   Lab 07/31/22  1354 07/31/22  1349   AST  --  129*   ALT  --  71*   ALKPHOS  --  52   BILITOTAL  --  1.9*   ALBUMIN  --  2.5*   INR 2.31*  --      Coagulation Profile  Recent Labs   Lab 07/31/22  1354   INR 2.31*   PTT 44*       IMAGING:  Recent Results (from the past 24 hour(s))   CT Abdomen Pelvis w/o & w Contrast    Narrative    Exam: Computed tomographic angiography of the abdomen and pelvis  without and with contrast, dated 7/31/2022 3:30 PM    Clinical information:  GIB    Technique: Helical scans through the abdomen and pelvis obtained  before the administration of intravenous contrast media and following  the injection of contrast media in the late arterial and delayed  phase.     Contrast: 101 mL Isovue 370    DLP: 741 mGy*cm    Comparison study: Ultrasound 6/28/2021    Findings:      The abdominal aorta is normal in caliber. Moderate atherosclerotic  disease of the abdominal aorta and its branches. Extensive  portosystemic collaterals including esophageal and gastric varices.  Circumaortic left renal vein anatomical variant.  The celiac axis, SMA and SAI are patent. The renal arteries are patent  bilaterally.   The splenic vein, portal vein, SMV and renal  veins are  patent.   The hepatic veins and IVC are patent.     Abdomen and pelvis:   Cirrhotic morphology of liver. Cholecystectomy. Punctate  calcifications within the head of the pancreas. Spleen and adrenal  glands appear within normal limits. Bilateral renal cysts. Normal  caliber small and large bowel. Urinary bladder is unremarkable. Pelvic  phleboliths. No free fluid or free air. No lymphadenopathy.    Lower thorax: Minimal bibasilar dependent atelectasis, otherwise clear  lung bases.    Bones and soft tissues: Soft tissue stranding about the umbilicus. No  acute or suspicious osseous abnormality.      Impression    Impression:  Hepatic cirrhosis with evidence of portal hypertension including  extensive portosystemic collaterals of esophageal and gastroesophageal  varices. No evidence of active extravasation. However esophageal  varices are not fully visualized. Can consider upper endoscopy for  continued clinical concern of ruptured esophageal varices.    I have personally reviewed the examination and initial interpretation  and I agree with the findings.    JAYCEE RODRIGUEZ MD         SYSTEM ID:  [id number]

## 2022-07-31 NOTE — CONSULTS
[facility]    Hepatology Consult  Requesting provider: Dr. Nobles  Consult requested for hematemesis, melena, concern for variceal bleeding    Assessment  Hematemesis, melena   Acute kidney injury  Lactic acidosis  Decompensated alcohol related cirrhosis  History of esophageal varices s/p banding  Active alcohol use  History of breast cancer s/p mastectomy   History of peptic ulcer disease      62 year old male with decompensated alcohol related cirrhosis (h/o variceal bleeding 9/2021, medically controlled ascites/fluid overload), active alcohol use, breast cancer s/p mastectomy who was brought to the ER by EMS due to hypotension and GI bleeding.(hemtemesis and melena)     Likely cause for GI bleeding is variceal hemorrhage considering history of known varices and clinical history of episodes of large volume hematemesis and melena. The other possibilities would be portal hypertensive gastropathy and  peptic ulcer disease as he has a known history and has other risk factors like active smoking and alcohol. Few other less likely possibilities include severe (LA Grade C/D) esophagitis, AVMs, Dieulafoy lesions, malignancy .     Pt is currently not having active bleeding, moreover he is in acidotic state, has acute kidney injury, hyperkalemia and hypotension. He would need additional resuscitation/stablization with pRBC transfusions and correction of acidosis and hyperkalemia as well as BP stabilization prior to upper endoscopy.    Recommendations  -- Ensure two large bore IVs  -- Adequate volume resuscitation with IVF, pRBC  -- Treatment of MARZENA and hyperkalemia as per ICU team  -- Monitor Hgb closely   Transfuse for Hgb<7 (improved outcomes with restricted vs liberal threshold)  -- IV pantoprazole   -- Octreotide gtt  -- IV Ceftriaxone (can transition to PO Cipro if tolerating PO)   Treat for 5-7 days  -- Monitor stool output  -- NPO status   -- Plan for EGD in AM  -- Additional  recommendations after procedure  -- Inform GI /hepatology on call if there is any evidence of hemodynamically significant GI bleeding    Grupo Fuller MD  ATTENDING NOTE HEPATOLOGY  I saw and discussed this patient with the fellow and participated in the decision making. I agree with the fellow's note. Shy Stafford MD      HPI:  62 year old male with decompensated alcohol related cirrhosis (h/o variceal bleeding 9/2021, medically controlled ascites/fluid overload), active alcohol use, breast cancer s/p mastectomy who was brought to the ER by EMS due to hypotension and GI bleeding.    Pt had denture implant on Thursday and noticed some gum bleeding after that. The following day ie Friday he noticed black tarry stool which he initially thought was related to gum bleeds. But on the same he vomited large volume of blood about a bucket full of blood as per the patient. He didn't seek any medical attention hoping that it would subside. He became progressively weak and was feeling light headed.     This morning he had another episode of hematemesis where he vomited about a bucket full of bright red blood. His roommate immediately called the EMS and he was brought to the ER. Since arrival in the ER pt didn't have any black colored stool, he did vomit about 200 ml of blood.     In the ER, he was hypotensive on arrival with Hb 5.7, lactate of 7.3, K of 7, BUN 73.3, Cr 3.61. INR 2.31. He was started on IV PPI, IV octreotide and IV Ceftiraxone. CT angio demonstrated gastroesophageal varices and evidence of portal hypertension but there was no active extravasation.     At the time of my exam, patient was alert and oriented. He gave a detailed history of the events. Denied any abd pain/nausea or vomiting.    He doesn't usually use NSAIDs but reports taking one tablet of Ibuprofen on Thursday after he had dental work.    He admits to active alcohol use, last drink was this morning prior to coming to the hospital. He has been  "drinking for about 50 years, amount of consumption varied over the years, most recently he consumes about 2 bottles of wine in a week with an occasional beer. He is also an active smoker, 1/2 pack per day. 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         Medical hx Surgical hx   Past Medical History:   Diagnosis Date     Alcoholic liver disease (H) 2010    Pt has cut down on alcoholic intake and has not needed a paracentesis since 2016 or 2017     Alcoholism (H)      Breast cancer (H) 11/2018    Lt sided      Past Surgical History:   Procedure Laterality Date     CHOLECYSTECTOMY       COLONOSCOPY N/A 7/15/2021    Procedure: Colonoscopy, With Polypectomy And Biopsy;  Surgeon: Matias García MD;  Location: [facility]     HERNIA REPAIR      x 2     MANDIBLE SURGERY      Dental jaw surgery 2008     MASTECTOMY Left 11/21/2018     RECONSTRUCT NOSE AND SEPTUM (FUNCTIONAL)            Medications  Current Facility-Administered Medications   Medication Dose Route Frequency     calcium gluconate  1 g Intravenous Once     dextrose 10%  300 mL Intravenous Once     insulin regular (HumuLIN R,NovoLIN R) for IV use  7 Units Intravenous Once       Allergies  Allergies   Allergen Reactions     Penicillins      Sulfa Drugs        Family hx Social hx   Family History   Problem Relation Age of Onset     Prostate Cancer Father      Aplastic anemia Sister       Social History     Tobacco Use     Smoking status: Current Every Day Smoker     Packs/day: 0.75     Smokeless tobacco: Never Used   Substance Use Topics     Alcohol use: Yes     Comment: daily 2-4 beers or couple glasses of wine     Drug use: Never          Review of systems  A 10-point review of systems was negative.      Examination  BP 94/44   Pulse 87   Temp 98.7  F (37.1  C)   Resp 13   Ht 1.727 m (5' 8\")   Wt 74.8 kg (165 lb)   SpO2 100%   BMI 25.09 kg/m      Intake/Output Summary (Last 24 hours) at 7/31/2022 1638  Last data " filed at 7/31/2022 1611  Gross per 24 hour   Intake 100 ml   Output 200 ml   Net -100 ml       Gen- well, NAD, A+Ox3  Eye- Sclera anicteric   Ears: Hearing intact   Neck: Supple   CVS- RRR  RS- Non labored breathing  Abd-Soft, non tender, non distended  Per rectal exam (done with his RN as chaperone) : Very scant (two tiny spots on glove) of melena   Extr- no HERNÁN  Neuro- no asterixis but has tremors (likely alcohol)   Skin- no rash  Psych- normal mood    Laboratory  Lab Results   Component Value Date     07/31/2022     06/29/2021    POTASSIUM 7.0 07/31/2022    POTASSIUM 7.0 07/31/2022    POTASSIUM 4.2 08/29/2021    POTASSIUM 4.1 06/29/2021    CHLORIDE 98 07/31/2022    CHLORIDE 113 08/29/2021    CHLORIDE 110 06/29/2021    CO2 18 07/31/2022    CO2 23 08/29/2021    CO2 23 06/29/2021    BUN 73.3 07/31/2022    BUN 6 08/29/2021    BUN 14 06/29/2021    CR 3.61 07/31/2022    CR 1.12 06/29/2021       Lab Results   Component Value Date    BILITOTAL 1.9 07/31/2022    BILITOTAL 1.3 06/29/2021    ALT 71 07/31/2022    ALT 31 06/29/2021     07/31/2022    AST 50 06/29/2021    ALKPHOS 52 07/31/2022    ALKPHOS 67 06/29/2021       Lab Results   Component Value Date    ALBUMIN 2.5 07/31/2022    ALBUMIN 2.3 08/29/2021    ALBUMIN 2.3 06/29/2021    PROTTOTAL 5.1 07/31/2022    PROTTOTAL 7.2 06/29/2021        Lab Results   Component Value Date    WBC 12.0 07/31/2022    WBC 5.8 06/29/2021    HGB 5.7 07/31/2022    HGB 8.0 06/30/2021     07/31/2022     06/29/2021    PLT 81 07/31/2022     06/29/2021       Lab Results   Component Value Date    INR 2.31 07/31/2022    INR 1.47 06/29/2021       Radiology          Grupo Fuller MD  Hepatology  #

## 2022-08-01 ENCOUNTER — APPOINTMENT (OUTPATIENT)
Dept: GENERAL RADIOLOGY | Facility: CLINIC | Age: 62
DRG: 432 | End: 2022-08-01
Attending: INTERNAL MEDICINE
Payer: COMMERCIAL

## 2022-08-01 ENCOUNTER — APPOINTMENT (OUTPATIENT)
Dept: ULTRASOUND IMAGING | Facility: CLINIC | Age: 62
DRG: 432 | End: 2022-08-01
Payer: COMMERCIAL

## 2022-08-01 LAB
ALBUMIN SERPL BCG-MCNC: 2.4 G/DL (ref 3.5–5.2)
ALBUMIN SERPL BCG-MCNC: 3.5 G/DL (ref 3.5–5.2)
ALBUMIN UR-MCNC: NEGATIVE MG/DL
ALP SERPL-CCNC: 42 U/L (ref 40–129)
ALP SERPL-CCNC: 55 U/L (ref 40–129)
ALT SERPL W P-5'-P-CCNC: 101 U/L (ref 10–50)
ALT SERPL W P-5'-P-CCNC: 116 U/L (ref 10–50)
ANION GAP SERPL CALCULATED.3IONS-SCNC: 10 MMOL/L (ref 7–15)
ANION GAP SERPL CALCULATED.3IONS-SCNC: 8 MMOL/L (ref 7–15)
ANION GAP SERPL CALCULATED.3IONS-SCNC: 8 MMOL/L (ref 7–15)
APPEARANCE UR: CLEAR
AST SERPL W P-5'-P-CCNC: 181 U/L (ref 10–50)
AST SERPL W P-5'-P-CCNC: 211 U/L (ref 10–50)
BASE EXCESS BLDV CALC-SCNC: -1.1 MMOL/L (ref -7.7–1.9)
BILIRUB SERPL-MCNC: 1.9 MG/DL
BILIRUB SERPL-MCNC: 2.5 MG/DL
BILIRUB UR QL STRIP: NEGATIVE
BUN SERPL-MCNC: 60.7 MG/DL (ref 8–23)
BUN SERPL-MCNC: 71.1 MG/DL (ref 8–23)
BUN SERPL-MCNC: 71.6 MG/DL (ref 8–23)
CALCIUM SERPL-MCNC: 7.9 MG/DL (ref 8.8–10.2)
CALCIUM SERPL-MCNC: 7.9 MG/DL (ref 8.8–10.2)
CALCIUM SERPL-MCNC: 8.2 MG/DL (ref 8.8–10.2)
CHLORIDE SERPL-SCNC: 102 MMOL/L (ref 98–107)
CHLORIDE SERPL-SCNC: 104 MMOL/L (ref 98–107)
CHLORIDE SERPL-SCNC: 105 MMOL/L (ref 98–107)
CK SERPL-CCNC: 262 U/L (ref 39–308)
COLOR UR AUTO: YELLOW
CREAT SERPL-MCNC: 2.17 MG/DL (ref 0.67–1.17)
CREAT SERPL-MCNC: 2.79 MG/DL (ref 0.67–1.17)
CREAT SERPL-MCNC: 3.21 MG/DL (ref 0.67–1.17)
DEPRECATED HCO3 PLAS-SCNC: 22 MMOL/L (ref 22–29)
DEPRECATED HCO3 PLAS-SCNC: 23 MMOL/L (ref 22–29)
DEPRECATED HCO3 PLAS-SCNC: 23 MMOL/L (ref 22–29)
ERYTHROCYTE [DISTWIDTH] IN BLOOD BY AUTOMATED COUNT: 16.3 % (ref 10–15)
ERYTHROCYTE [DISTWIDTH] IN BLOOD BY AUTOMATED COUNT: 16.6 % (ref 10–15)
ERYTHROCYTE [DISTWIDTH] IN BLOOD BY AUTOMATED COUNT: 16.7 % (ref 10–15)
GFR SERPL CREATININE-BSD FRML MDRD: 21 ML/MIN/1.73M2
GFR SERPL CREATININE-BSD FRML MDRD: 25 ML/MIN/1.73M2
GFR SERPL CREATININE-BSD FRML MDRD: 33 ML/MIN/1.73M2
GLUCOSE BLDC GLUCOMTR-MCNC: 118 MG/DL (ref 70–99)
GLUCOSE BLDC GLUCOMTR-MCNC: 129 MG/DL (ref 70–99)
GLUCOSE BLDC GLUCOMTR-MCNC: 136 MG/DL (ref 70–99)
GLUCOSE BLDC GLUCOMTR-MCNC: 142 MG/DL (ref 70–99)
GLUCOSE SERPL-MCNC: 109 MG/DL (ref 70–99)
GLUCOSE SERPL-MCNC: 140 MG/DL (ref 70–99)
GLUCOSE SERPL-MCNC: 149 MG/DL (ref 70–99)
GLUCOSE UR STRIP-MCNC: NEGATIVE MG/DL
HCO3 BLDV-SCNC: 24 MMOL/L (ref 21–28)
HCT VFR BLD AUTO: 22.1 % (ref 40–53)
HCT VFR BLD AUTO: 24.9 % (ref 40–53)
HCT VFR BLD AUTO: 25.8 % (ref 40–53)
HGB BLD-MCNC: 7.4 G/DL (ref 13.3–17.7)
HGB BLD-MCNC: 8.4 G/DL (ref 13.3–17.7)
HGB BLD-MCNC: 8.4 G/DL (ref 13.3–17.7)
HGB BLD-MCNC: 8.9 G/DL (ref 13.3–17.7)
HGB UR QL STRIP: NEGATIVE
INR PPP: 2.09 (ref 0.85–1.15)
KETONES UR STRIP-MCNC: NEGATIVE MG/DL
LACTATE SERPL-SCNC: 2.1 MMOL/L (ref 0.7–2)
LACTATE SERPL-SCNC: 3 MMOL/L (ref 0.7–2)
LEUKOCYTE ESTERASE UR QL STRIP: NEGATIVE
MAGNESIUM SERPL-MCNC: 2 MG/DL (ref 1.7–2.3)
MCH RBC QN AUTO: 31.8 PG (ref 26.5–33)
MCH RBC QN AUTO: 32 PG (ref 26.5–33)
MCH RBC QN AUTO: 33 PG (ref 26.5–33)
MCHC RBC AUTO-ENTMCNC: 33.5 G/DL (ref 31.5–36.5)
MCHC RBC AUTO-ENTMCNC: 33.7 G/DL (ref 31.5–36.5)
MCHC RBC AUTO-ENTMCNC: 34.5 G/DL (ref 31.5–36.5)
MCV RBC AUTO: 94 FL (ref 78–100)
MCV RBC AUTO: 96 FL (ref 78–100)
MCV RBC AUTO: 96 FL (ref 78–100)
NITRATE UR QL: NEGATIVE
O2/TOTAL GAS SETTING VFR VENT: 21 %
PCO2 BLDV: 39 MM HG (ref 40–50)
PH BLDV: 7.4 [PH] (ref 7.32–7.43)
PH UR STRIP: 5.5 [PH] (ref 5–7)
PHOSPHATE SERPL-MCNC: 4.4 MG/DL (ref 2.5–4.5)
PLATELET # BLD AUTO: 57 10E3/UL (ref 150–450)
PLATELET # BLD AUTO: 76 10E3/UL (ref 150–450)
PLATELET # BLD AUTO: 80 10E3/UL (ref 150–450)
PO2 BLDV: 38 MM HG (ref 25–47)
POTASSIUM SERPL-SCNC: 4.8 MMOL/L (ref 3.4–5.3)
POTASSIUM SERPL-SCNC: 5.1 MMOL/L (ref 3.4–5.3)
POTASSIUM SERPL-SCNC: 5.7 MMOL/L (ref 3.4–5.3)
PROT SERPL-MCNC: 5.1 G/DL (ref 6.4–8.3)
PROT SERPL-MCNC: 5.5 G/DL (ref 6.4–8.3)
RBC # BLD AUTO: 2.31 10E6/UL (ref 4.4–5.9)
RBC # BLD AUTO: 2.64 10E6/UL (ref 4.4–5.9)
RBC # BLD AUTO: 2.7 10E6/UL (ref 4.4–5.9)
SODIUM SERPL-SCNC: 134 MMOL/L (ref 136–145)
SODIUM SERPL-SCNC: 135 MMOL/L (ref 136–145)
SODIUM SERPL-SCNC: 136 MMOL/L (ref 136–145)
SODIUM UR-SCNC: <20 MMOL/L
SP GR UR STRIP: 1.02 (ref 1–1.03)
UPPER GI ENDOSCOPY: NORMAL
UROBILINOGEN UR STRIP-MCNC: NORMAL MG/DL
WBC # BLD AUTO: 10.5 10E3/UL (ref 4–11)
WBC # BLD AUTO: 11.5 10E3/UL (ref 4–11)
WBC # BLD AUTO: 7.1 10E3/UL (ref 4–11)

## 2022-08-01 PROCEDURE — 250N000013 HC RX MED GY IP 250 OP 250 PS 637: Performed by: STUDENT IN AN ORGANIZED HEALTH CARE EDUCATION/TRAINING PROGRAM

## 2022-08-01 PROCEDURE — 99221 1ST HOSP IP/OBS SF/LOW 40: CPT | Mod: GC | Performed by: INTERNAL MEDICINE

## 2022-08-01 PROCEDURE — 76705 ECHO EXAM OF ABDOMEN: CPT | Mod: 26 | Performed by: STUDENT IN AN ORGANIZED HEALTH CARE EDUCATION/TRAINING PROGRAM

## 2022-08-01 PROCEDURE — 258N000003 HC RX IP 258 OP 636: Performed by: STUDENT IN AN ORGANIZED HEALTH CARE EDUCATION/TRAINING PROGRAM

## 2022-08-01 PROCEDURE — 3E043XZ INTRODUCTION OF VASOPRESSOR INTO CENTRAL VEIN, PERCUTANEOUS APPROACH: ICD-10-PCS | Performed by: INTERNAL MEDICINE

## 2022-08-01 PROCEDURE — 82947 ASSAY GLUCOSE BLOOD QUANT: CPT

## 2022-08-01 PROCEDURE — 81003 URINALYSIS AUTO W/O SCOPE: CPT | Performed by: STUDENT IN AN ORGANIZED HEALTH CARE EDUCATION/TRAINING PROGRAM

## 2022-08-01 PROCEDURE — 85027 COMPLETE CBC AUTOMATED: CPT

## 2022-08-01 PROCEDURE — 99207 PR NO BILLABLE SERVICE THIS VISIT: CPT | Performed by: INTERNAL MEDICINE

## 2022-08-01 PROCEDURE — 250N000011 HC RX IP 250 OP 636

## 2022-08-01 PROCEDURE — 84450 TRANSFERASE (AST) (SGOT): CPT

## 2022-08-01 PROCEDURE — 250N000009 HC RX 250

## 2022-08-01 PROCEDURE — P9045 ALBUMIN (HUMAN), 5%, 250 ML: HCPCS

## 2022-08-01 PROCEDURE — 83735 ASSAY OF MAGNESIUM: CPT | Performed by: STUDENT IN AN ORGANIZED HEALTH CARE EDUCATION/TRAINING PROGRAM

## 2022-08-01 PROCEDURE — 71045 X-RAY EXAM CHEST 1 VIEW: CPT | Mod: 26 | Performed by: RADIOLOGY

## 2022-08-01 PROCEDURE — 999N000065 XR CHEST PORT 1 VIEW

## 2022-08-01 PROCEDURE — 250N000011 HC RX IP 250 OP 636: Mod: JA | Performed by: STUDENT IN AN ORGANIZED HEALTH CARE EDUCATION/TRAINING PROGRAM

## 2022-08-01 PROCEDURE — 43244 EGD VARICES LIGATION: CPT | Performed by: INTERNAL MEDICINE

## 2022-08-01 PROCEDURE — 84100 ASSAY OF PHOSPHORUS: CPT | Performed by: STUDENT IN AN ORGANIZED HEALTH CARE EDUCATION/TRAINING PROGRAM

## 2022-08-01 PROCEDURE — 250N000011 HC RX IP 250 OP 636: Performed by: STUDENT IN AN ORGANIZED HEALTH CARE EDUCATION/TRAINING PROGRAM

## 2022-08-01 PROCEDURE — 82310 ASSAY OF CALCIUM: CPT

## 2022-08-01 PROCEDURE — P9047 ALBUMIN (HUMAN), 25%, 50ML: HCPCS

## 2022-08-01 PROCEDURE — 85018 HEMOGLOBIN: CPT

## 2022-08-01 PROCEDURE — 200N000002 HC R&B ICU UMMC

## 2022-08-01 PROCEDURE — 99291 CRITICAL CARE FIRST HOUR: CPT | Mod: GC | Performed by: INTERNAL MEDICINE

## 2022-08-01 PROCEDURE — 258N000001 HC RX 258: Performed by: STUDENT IN AN ORGANIZED HEALTH CARE EDUCATION/TRAINING PROGRAM

## 2022-08-01 PROCEDURE — 76705 ECHO EXAM OF ABDOMEN: CPT

## 2022-08-01 PROCEDURE — 82550 ASSAY OF CK (CPK): CPT | Performed by: STUDENT IN AN ORGANIZED HEALTH CARE EDUCATION/TRAINING PROGRAM

## 2022-08-01 PROCEDURE — 250N000011 HC RX IP 250 OP 636: Performed by: INTERNAL MEDICINE

## 2022-08-01 PROCEDURE — 06L38CZ OCCLUSION OF ESOPHAGEAL VEIN WITH EXTRALUMINAL DEVICE, VIA NATURAL OR ARTIFICIAL OPENING ENDOSCOPIC: ICD-10-PCS | Performed by: INTERNAL MEDICINE

## 2022-08-01 PROCEDURE — 258N000003 HC RX IP 258 OP 636

## 2022-08-01 PROCEDURE — 83605 ASSAY OF LACTIC ACID: CPT | Performed by: INTERNAL MEDICINE

## 2022-08-01 PROCEDURE — 85610 PROTHROMBIN TIME: CPT

## 2022-08-01 RX ORDER — CEFTRIAXONE 1 G/1
1 INJECTION, POWDER, FOR SOLUTION INTRAMUSCULAR; INTRAVENOUS EVERY 24 HOURS
Status: DISCONTINUED | OUTPATIENT
Start: 2022-08-01 | End: 2022-08-03

## 2022-08-01 RX ORDER — FENTANYL CITRATE 50 UG/ML
50 INJECTION, SOLUTION INTRAMUSCULAR; INTRAVENOUS
Status: DISCONTINUED | OUTPATIENT
Start: 2022-08-01 | End: 2022-08-01

## 2022-08-01 RX ORDER — FENTANYL CITRATE 50 UG/ML
50-400 INJECTION, SOLUTION INTRAMUSCULAR; INTRAVENOUS
Status: COMPLETED | OUTPATIENT
Start: 2022-08-01 | End: 2022-08-01

## 2022-08-01 RX ORDER — FLUMAZENIL 0.1 MG/ML
0.2 INJECTION, SOLUTION INTRAVENOUS
Status: DISCONTINUED | OUTPATIENT
Start: 2022-08-01 | End: 2022-08-03 | Stop reason: HOSPADM

## 2022-08-01 RX ORDER — TAMOXIFEN CITRATE 20 MG/1
20 TABLET ORAL DAILY
Status: DISCONTINUED | OUTPATIENT
Start: 2022-08-01 | End: 2022-08-03 | Stop reason: HOSPADM

## 2022-08-01 RX ORDER — NALOXONE HYDROCHLORIDE 0.4 MG/ML
0.2 INJECTION, SOLUTION INTRAMUSCULAR; INTRAVENOUS; SUBCUTANEOUS
Status: DISCONTINUED | OUTPATIENT
Start: 2022-08-01 | End: 2022-08-03 | Stop reason: HOSPADM

## 2022-08-01 RX ORDER — ALBUMIN, HUMAN INJ 5% 5 %
25 SOLUTION INTRAVENOUS ONCE
Status: COMPLETED | OUTPATIENT
Start: 2022-08-01 | End: 2022-08-01

## 2022-08-01 RX ORDER — FOLIC ACID 1 MG/1
1 TABLET ORAL DAILY
Status: COMPLETED | OUTPATIENT
Start: 2022-08-01 | End: 2022-08-02

## 2022-08-01 RX ORDER — HYDROCODONE BITARTRATE AND ACETAMINOPHEN 10; 325 MG/1; MG/1
1 TABLET ORAL EVERY 6 HOURS PRN
Status: DISCONTINUED | OUTPATIENT
Start: 2022-08-01 | End: 2022-08-03 | Stop reason: HOSPADM

## 2022-08-01 RX ORDER — NOREPINEPHRINE BITARTRATE 0.06 MG/ML
.01-.6 INJECTION, SOLUTION INTRAVENOUS CONTINUOUS
Status: DISCONTINUED | OUTPATIENT
Start: 2022-08-01 | End: 2022-08-02

## 2022-08-01 RX ORDER — NALOXONE HYDROCHLORIDE 0.4 MG/ML
0.4 INJECTION, SOLUTION INTRAMUSCULAR; INTRAVENOUS; SUBCUTANEOUS
Status: DISCONTINUED | OUTPATIENT
Start: 2022-08-01 | End: 2022-08-03 | Stop reason: HOSPADM

## 2022-08-01 RX ORDER — MULTIPLE VITAMINS W/ MINERALS TAB 9MG-400MCG
1 TAB ORAL DAILY
Status: DISPENSED | OUTPATIENT
Start: 2022-08-01 | End: 2022-08-03

## 2022-08-01 RX ORDER — ALBUMIN (HUMAN) 12.5 G/50ML
50 SOLUTION INTRAVENOUS ONCE
Status: COMPLETED | OUTPATIENT
Start: 2022-08-01 | End: 2022-08-01

## 2022-08-01 RX ORDER — DEXTROSE MONOHYDRATE 25 G/50ML
25 INJECTION, SOLUTION INTRAVENOUS ONCE
Status: COMPLETED | OUTPATIENT
Start: 2022-08-01 | End: 2022-08-01

## 2022-08-01 RX ORDER — NOREPINEPHRINE BITARTRATE 0.06 MG/ML
.01-.6 INJECTION, SOLUTION INTRAVENOUS CONTINUOUS
Status: DISCONTINUED | OUTPATIENT
Start: 2022-08-01 | End: 2022-08-01

## 2022-08-01 RX ORDER — LORAZEPAM 0.5 MG/1
1-2 TABLET ORAL EVERY 30 MIN PRN
Status: DISCONTINUED | OUTPATIENT
Start: 2022-08-01 | End: 2022-08-03 | Stop reason: HOSPADM

## 2022-08-01 RX ORDER — LORAZEPAM 2 MG/ML
1-2 INJECTION INTRAMUSCULAR EVERY 30 MIN PRN
Status: DISCONTINUED | OUTPATIENT
Start: 2022-08-01 | End: 2022-08-03

## 2022-08-01 RX ADMIN — DEXTROSE MONOHYDRATE 300 ML: 100 INJECTION, SOLUTION INTRAVENOUS at 16:20

## 2022-08-01 RX ADMIN — FENTANYL CITRATE 200 MCG: 50 INJECTION, SOLUTION INTRAMUSCULAR; INTRAVENOUS at 10:44

## 2022-08-01 RX ADMIN — HYDROCODONE BITARTRATE AND ACETAMINOPHEN 1 TABLET: 10; 325 TABLET ORAL at 21:01

## 2022-08-01 RX ADMIN — CEFTRIAXONE SODIUM 1 G: 1 INJECTION, POWDER, FOR SOLUTION INTRAMUSCULAR; INTRAVENOUS at 12:13

## 2022-08-01 RX ADMIN — MIDAZOLAM HYDROCHLORIDE 5 MG: 1 INJECTION, SOLUTION INTRAMUSCULAR; INTRAVENOUS at 10:43

## 2022-08-01 RX ADMIN — THIAMINE HCL TAB 100 MG 100 MG: 100 TAB at 12:12

## 2022-08-01 RX ADMIN — HUMAN INSULIN 7.5 UNITS: 100 INJECTION, SOLUTION SUBCUTANEOUS at 16:13

## 2022-08-01 RX ADMIN — DEXTROSE MONOHYDRATE 25 G: 25 INJECTION, SOLUTION INTRAVENOUS at 16:13

## 2022-08-01 RX ADMIN — ALBUMIN HUMAN 25 G: 0.05 INJECTION, SOLUTION INTRAVENOUS at 12:12

## 2022-08-01 RX ADMIN — SODIUM CHLORIDE 500 ML: 9 INJECTION, SOLUTION INTRAVENOUS at 16:10

## 2022-08-01 RX ADMIN — TAMOXIFEN CITRATE 20 MG: 20 TABLET ORAL at 13:07

## 2022-08-01 RX ADMIN — OCTREOTIDE ACETATE 50 MCG/HR: 200 INJECTION, SOLUTION INTRAVENOUS; SUBCUTANEOUS at 13:41

## 2022-08-01 RX ADMIN — Medication 0.13 MCG/KG/MIN: at 03:32

## 2022-08-01 RX ADMIN — ALBUMIN HUMAN 50 G: 0.25 SOLUTION INTRAVENOUS at 16:09

## 2022-08-01 RX ADMIN — FOLIC ACID 1 MG: 1 TABLET ORAL at 12:12

## 2022-08-01 ASSESSMENT — ACTIVITIES OF DAILY LIVING (ADL)
ADLS_ACUITY_SCORE: 22
ADLS_ACUITY_SCORE: 28
ADLS_ACUITY_SCORE: 22
ADLS_ACUITY_SCORE: 37
ADLS_ACUITY_SCORE: 22
ADLS_ACUITY_SCORE: 22
ADLS_ACUITY_SCORE: 37
ADLS_ACUITY_SCORE: 37
ADLS_ACUITY_SCORE: 22
ADLS_ACUITY_SCORE: 22

## 2022-08-01 NOTE — PHARMACY-ADMISSION MEDICATION HISTORY
Admission Medication History Completed by Pharmacy    See [facility] Admission Navigator for allergy information, preferred outpatient pharmacy, prior to admission medications and immunization status.     Medication History Sources:     [facility]    Changes made to PTA medication list (reason):    Added: None    Deleted: None    Changed: Added prescribing instructions for PRN Alprazolam and Norco     Additional Information:    Patient reports he only takes 1 to 2 Norco per day and 1 to 2 Alprazolam per day (one in the morning one in the evening before bed)    Prior to Admission medications    Medication Sig Last Dose Taking? Auth Provider Long Term End Date   ALPRAZolam (XANAX) 0.5 MG tablet 3 times daily as needed   Reported, Patient     furosemide (LASIX) 40 MG tablet Take 2 tablets (80 mg) by mouth daily  Patient taking differently: Take 40 mg by mouth daily   Zach Larson MD Yes    HYDROcodone-acetaminophen (NORCO)  MG per tablet every 6 hours as needed   Reported, Patient     multivitamin, therapeutic (THERA-VIT) TABS tablet Take 1 tablet by mouth daily   Reported, Patient     spironolactone (ALDACTONE) 50 MG tablet Take 1 tablet (50 mg) by mouth daily  Patient taking differently: Take 50 mg by mouth 2 times daily   Zach Larson MD Yes    tamoxifen (NOLVADEX) 20 MG tablet TK 1 T PO DAILY   Reported, Patient Yes        Date completed: 08/01/22    Medication history completed by:  Deion Covington, PharmD, BCCCP

## 2022-08-01 NOTE — OR NURSING
EGD with banding at bedside, concious sedation and tolerated well. Bedside RN monitored vitals and sedation.

## 2022-08-01 NOTE — PROGRESS NOTES
Admitted/transferred from: ED  Reason for admission/transfer: Hypotension, lab values, GI bleed  Patient status upon admission/transfer: A/Ox4, pleasant, hypotensive, some tremors  Interventions: Fluid boluses, PRBCs, pressor  Plan: MAP > 65  2 RN skin assessment: completed 8/1 @ 0700 by Krista, RN & Nakia RN.  Result of skin assessment and interventions/actions: NA  Height, weight, drug calc weight: Done  Patient belongings (see Flowsheet - Adult Profile for details): Clothing, shoes, cell phone/, glasses, various credit cards, cigarettes/lighter  MDRO education (if applicable): NA      Update:  Neurologically intact. Tele sinus rhythm, afebrile. MAP goal achieved via Levo. Rt internal jugular line placed. Remains on RA. No nausea or emesis on shift, no BMs. Plan for endoscopy today, NPO since admission to floor.

## 2022-08-01 NOTE — PLAN OF CARE
Goal Outcome Evaluation:    ICU End of Shift Summary. See flowsheets for vital signs and detailed assessment.    Changes this shift: A&O, CIWA 3-5 (+ tremors). Denies pain. Does have Vicodin available PRN per home med. MAP goal decreased to 60, able to wean off Levo by ~1600. Received 500 mL NS bolus x1, albumin bolus x2 with good response. Tolerating clear liquid diet. No N/V or stool this shift. EGD done this AM, s/p 3 bands. Voiding via urinal. Bladder scan x1 (100 cc), denies feelings of retention. Protonix gtt DC'd. K=5.7; shifted x1, D10 started x4 hours. Hgb stable.    Plan: Notify if K+ >5.4. Monitor hemodynamics. Implement POC, notify provider of changes.      Problem: Behavioral Health Comorbidity  Goal: Maintenance of Behavioral Health Symptom Control  Outcome: Ongoing, Progressing

## 2022-08-01 NOTE — PROGRESS NOTES
Brief GI Procedure Note    08/01/22    Procedure: EGD  GI Staff: Sierra  GI Fellow: Erlinda     Findings:  -- two column variceal disease, multiple areas with high risk stigmata on one of the columns, areas of significant sclerosis/scarrning from previous banding  -- diffuse moderate portal hypertensive gastropathy with contact oozing  -- no evidence of gastric varices in fundus or at GEJ  -- duodenum without lesion  -- semi-liquid hematin/coffee ground material throughout stomach and duodenum, lavaged for visualization    Interventions:  -- endoscopic band ligation x 3  -- 3x bands on single most prominent column with high risk stigmata, second column with too much scarring to suction safely on multiple occassions, endoscopic inspection with three stable, well-placed bands and sufficient deflation or proximal aspect of varices    Recommendations:  -- continue octreotide for at least 72 hours from presentation to hospital  -- 7 day course of prophylactic antibiotics (IV ceftriaxone, if tolerating PO, then ciprofloxacin)   -- clear liquids today, soft diet tomorrow, then ADAT if no pain  -- once daily PPI sufficient   -- transfuse to defend hgb of 7 from hepatology perspective  -- continue to monitor for EtOH withdrawal, CIWA as appropriate   -- hepatology team will continue to follow, please do not hesitate to reach out with questions or concerns    Ed Coffey MD, PGY5  Gastroenterology Fellow  [facility]  See AMCOM/Qgenda for GI on-call information    Procedure performed with Dr. Esquivel

## 2022-08-01 NOTE — PROCEDURES
[facility]    Central line    Date/Time: 8/1/2022 3:51 AM  Performed by: Gerry Castañeda MD  Authorized by: Flaco Gaxiola MD   Indications: vascular access      UNIVERSAL PROTOCOL   Site Marked: NA  Prior Images Obtained and Reviewed:  Yes  Required items: Required blood products, implants, devices and special equipment available    Patient identity confirmed:  Verbally with patient and provided demographic data  NA - No sedation, light sedation, or local anesthesia  Confirmation Checklist:  Patient's identity using two indicators, procedure was appropriate and matched the consent or emergent situation, relevant allergies and correct equipment/implants were available  Time out: Immediately prior to the procedure a time out was called    Universal Protocol: the Joint Commission Universal Protocol was followed    Preparation: Patient was prepped and draped in usual sterile fashion       ANESTHESIA    Anesthesia: Local infiltration      SEDATION    Patient Sedated: No      Procedure prep: skin preparation performed.  Skin prep agent dried: skin prep agent completely dried prior to procedure  Sterile barriers: all five maximum sterile barriers used - cap, mask, sterile gown, sterile gloves, and large sterile sheet  Hand hygiene: hand hygiene performed prior to central venous catheter insertion  Patient position: flat  Catheter type: triple lumen  Pre-procedure: landmarks identified  Ultrasound guidance: yes  Sterile ultrasound techniques: sterile gel and sterile probe covers were used  Number of attempts: 1  Successful placement: yes  Post-procedure: line sutured and dressing applied  Assessment: blood return through all ports,  placement verified by x-ray and no pneumothorax on x-ray      PROCEDURE    Patient Tolerance:  Patient tolerated the procedure well with no immediate complications  Length of time physician/provider present for 1:1 monitoring during sedation: 0  (N/A - no sedation)

## 2022-08-01 NOTE — CONSULTS
[facility]  Consult Note - NEPHROLOGY SERVICE   Date of Admission:  7/31/2022  Consult Requested by:Butch Nobles MD   Reason for Consult:   Consult Orders   Nephrology General Adult IP Consult: Patient with hx ETOH cirrhosis c/b esophageal varices, presenting with blood loss anemia and acute kidney failure (baseline cr 0.8, now 3.61, K 7.0 on admission).  Consulting for eval and management, including ... [[id number]] ordered by Butch Nobles MD at 07/31/22 1745              Assessment & Plan   Gilles Malloy is a 62 year old male admitted on 7/31/2022. He has a past medical history notable for alcohol use disorder complicated by alcoholic cirrhosis with known esophageal varices and history of ascites left sided breast cancer status post mastectomy who presented via ambulance with persistent hematemesis and dizziness found to have acute blood loss anemia, hyperkalemia, anion gap metabolic acidosis and acute  kidney injury now status post 5 L of crystalloid resuscitation, bicarb, potassium shifting, along with 3 units of packed RBCs currently admitted and medical ICU requiring pressor support, s/p EGD with banding for definitive treatment of variceal GI bleed.    Nephrology was consulted for management of acute kidney failure and question of whether or not hemodialysis may be warranted from such.    #Non-oliguric Acute kidney injury (improving)  #Hyperkalemia (improving)  #Anion gap metabolic acidosis (improved)  #Lactic acidosis 2/2 hypoperfusion from hemorrhagic shock (resolved)  #Acute blood loss anemia secondary to upper GI bleed and a known cirrhotic with varices s/p banding and pRBCs(improved)  # Thrombocytopenia, chronic   #Hepatocellular pattern of elevation in liver enzymes  #Elevated INR          Patient in setting of hemorrhagic shock likely suffered hypoperfusion to the kidney and while U/A sediment doesn't show overt evidence of ATN, I suspect likely  a component of ischemic ATN contributing to his MARZENA. Regardless, given ongoing adequate urine output, and appropriate resuscitation along with improvement in Creatinine, there is currently not indication for renal replacement therapy in this patient and will likely benefit from intravascular repletion and bleeding source control as ongoing.       - Give 50g of Albumin along with 500 ml of NS     - If ongoing rise in K+ give Sodium-Zirconim cyclosilicate(Lokelma) 10 TID for 48 hours.     - trend BMP and cbc ; recheck  K+ after 4 hours of interventions.     - Maintain MAPS >65 to ensure adequate perfusion to the kidneys.    - May consider further crystalloid resuscitation as needed to maintain MAPS >65      - avoid nephrotoxins.     - track I/Os, with particular attention to the urine output.    - Agree w/ primary team for management of other issues.     Nephrology will follow along with you.   Thanks for letting us be part of Mr. Malloy's care.      The patient's care was discussed with the patient, bedside nurse and attending physician Dr. Abisai Brantley MD   PGY2   Internal Medicine   Nephrology [facility]  Securely message with the Vocera Web Console (learn more here)  Text page via [facility] Paging/Directory       Hospitalist Service    Clinically Significant Risk Factors Present on Admission             # Hypoalbuminemia: Albumin = 2.2 g/dL (Ref range: 3.5 - 5.2 g/dL) on admission, will monitor as appropriate   # Coagulation Defect: INR = 2.09 (Ref range: 0.85 - 1.15) and/or PTT = 44 Seconds (Ref range: 22 - 38 Seconds) on admission, will monitor for bleeding  # Thrombocytopenia: Plts = 76 10e3/uL (Ref range: 150 - 450 10e3/uL) on admission, will monitor for bleeding    # Circulatory Shock: currently requiring pressors for blood pressure support   # Overweight: Estimated body mass index is 27.05 kg/m  as calculated from the following:    Height as  "of this encounter: 1.727 m (5' 8\").    Weight as of this encounter: 80.7 kg (177 lb 14.6 oz).        ______________________________________________________________________    Chief Complaint    Hematemesis, dark stools.      History of Present Illness   Gilles Malloy is a 62 year old male admitted on 7/31/2022. He has a past medical history notable for alcohol use disorder complicated by alcoholic cirrhosis with known esophageal varices and history of ascites left sided breast cancer status post mastectomy who presented via ambulance with persistent hematemesis and dizziness found to have acute blood loss anemia, hyperkalemia, anion gap metabolic acidosis and acute  kidney injury now status post 5 L of crystalloid resuscitation, bicarb, potassium shifting, along with 3 units of packed RBCs currently admitted and medical ICU requiring pressor support, s/p upper endoscopy today with banding for hopefully definitive treatment of variceal GI bleed.    Patient seen in room initially during EGD and then later afterwards.   Patient reported hematemesis starting last Friday, worsening on Saturday and his starting feeling dizzy on Saturday night. Reports he threw up a 'huge amount' of blood . Per chart review over 1L sighted by EMS at the time when he was found. Reports a history of varices but no prior history of kidney disease.     Reports no fevers, chills, hematemesis, chest pain, dyspnea, abdominal pain, headaches or ongoing dizziness (though hadn't stood up), able to urinate without issue.   He denies any previous history of kidney disease.     Review of Systems   Unless as indicated, all systems reviewed were negative.     Past Medical History    I have reviewed this patient's medical history and updated it with pertinent information if needed.   Past Medical History:   Diagnosis Date     Alcoholic liver disease (H) 2010    Pt has cut down on alcoholic intake and has not needed a paracentesis since 2016 or 2017 "     Alcoholism (H)      Breast cancer (H) 11/2018    Lt sided       Past Surgical History   I have reviewed this patient's surgical history and updated it with pertinent information if needed.  Past Surgical History:   Procedure Laterality Date     CHOLECYSTECTOMY       COLONOSCOPY N/A 7/15/2021    Procedure: Colonoscopy, With Polypectomy And Biopsy;  Surgeon: Matias García MD;  Location:  GI     HERNIA REPAIR      x 2     MANDIBLE SURGERY      Dental jaw surgery 2008     MASTECTOMY Left 11/21/2018     RECONSTRUCT NOSE AND SEPTUM (FUNCTIONAL)         Social History   I have reviewed this patient's social history and updated it with pertinent information if needed.  Social History     Tobacco Use     Smoking status: Current Every Day Smoker     Packs/day: 0.75     Smokeless tobacco: Never Used   Substance Use Topics     Alcohol use: Yes     Comment: daily 2-4 beers or couple glasses of wine     Drug use: Never       Family History   I have reviewed this patient's family history and updated it with pertinent information if needed.  Family History   Problem Relation Age of Onset     Prostate Cancer Father      Aplastic anemia Sister        Medications   Current Facility-Administered Medications   Medication     cefTRIAXone (ROCEPHIN) 1 g vial to attach to  mL bag for ADULTS or NS 50 mL bag for PEDS     dextrose 10% BOLUS     glucose gel 15-30 g    Or     dextrose 50 % injection 25-50 mL    Or     glucagon injection 1 mg     flumazenil (ROMAZICON) injection 0.2 mg     folic acid (FOLVITE) tablet 1 mg     HYDROcodone-acetaminophen (NORCO)  MG per tablet 1 tablet     LORazepam (ATIVAN) tablet 1-2 mg    Or     LORazepam (ATIVAN) injection 1-2 mg     multivitamin w/minerals (THERA-VIT-M) tablet 1 tablet     naloxone (NARCAN) injection 0.2 mg    Or     naloxone (NARCAN) injection 0.4 mg    Or     naloxone (NARCAN) injection 0.2 mg    Or     naloxone (NARCAN) injection 0.4 mg     norepinephrine  (LEVOPHED) 16 mg in  mL infusion MAX CONC CENTRAL LINE     octreotide (sandoSTATIN) 1,250 mcg in D5W 250 mL     ondansetron (ZOFRAN) injection 4 mg     [START ON 8/2/2022] pantoprazole (PROTONIX) IV push injection 40 mg     tamoxifen (NOLVADEX) tablet 20 mg     thiamine (B-1) tablet 100 mg       Allergies   Allergies   Allergen Reactions     Penicillins      Sulfa Drugs        Physical Exam   Vital Signs: Temp: 98.1  F (36.7  C) Temp src: Oral BP: 99/59 Pulse: 87   Resp: 10 SpO2: 93 % O2 Device: None (Room air)    Weight: 177 lbs 14.58 oz     General: alert and oriented x3, in no apparent cardiopulmonary distress   Neck: RIJ line in place.   HEENT: normocephalic, atraumatic,dry mucous membranes PERRL, EOMI, no scleral icterus or chemosis,conjunctival pallor   Neurologic: 5/5 strength in all 4 extremities, deferred gait, sensation grossly intact   Cardiac: Regular rate and rhythm, normal S1, S2, no murmurs gallops or rubs    Pulmonary:good air movement bilaterally, with occasional upper airway sounds resonating throughout.  Abdomen: soft, non-distended, no irregular masses, abdominal aorta palpable, no organomegaly, no rebound or guarding.   Genitourinary: No urinary catheter present   Extremities: No lower extremity edema, palpable dorsalis pedis and posterior tibialis pulses.   Musculoskeletal: No joint swelling, full ROM in all large joints   Psychiatric: Linear thought processes, normal speech, mood appropriate affect.           Data   Results for orders placed or performed during the hospital encounter of 07/31/22 (from the past 24 hour(s))   Prepare red blood cells (unit)   Result Value Ref Range    CROSSMATCH Compatible     UNIT ABO/RH O Pos     Unit Number [id number]     Unit Status Issued     Blood Component Type Red Blood Cells     Product Code O2266I39     CODING SYSTEM [id number]     UNIT TYPE ISBT 5100     ISSUE DATE AND TIME [id number]    Prepare red blood cells (unit)   Result Value Ref Range     CROSSMATCH Compatible     UNIT ABO/RH O Pos     Unit Number [id number]     Unit Status Transfused     Blood Component Type Red Blood Cells     Product Code [id number]     CODING SYSTEM [id number]     UNIT TYPE ISBT 5100     ISSUE DATE AND TIME [id number]    Blood Culture Peripheral Blood    Specimen: Peripheral Blood   Result Value Ref Range    Culture No growth after 12 hours    UA with Microscopic reflex to Culture    Specimen: Urine, Midstream   Result Value Ref Range    Color Urine Yellow Colorless, Straw, Light Yellow, Yellow    Appearance Urine Slightly Cloudy (A) Clear    Glucose Urine Negative Negative mg/dL    Bilirubin Urine Negative Negative    Ketones Urine Trace (A) Negative mg/dL    Specific Gravity Urine 1.018 1.003 - 1.035    Blood Urine Trace (A) Negative    pH Urine 5.0 5.0 - 7.0    Protein Albumin Urine Negative Negative mg/dL    Urobilinogen Urine Normal Normal, 2.0 mg/dL    Nitrite Urine Negative Negative    Leukocyte Esterase Urine Trace (A) Negative    Bacteria Urine Few (A) None Seen /HPF    Mucus Urine Present (A) None Seen /LPF    RBC Urine <1 <=2 /HPF    WBC Urine 6 (H) <=5 /HPF    Squamous Epithelials Urine 1 <=1 /HPF    Hyaline Casts Urine 8 (H) <=2 /LPF    Narrative    Urine Culture not indicated   Sodium random urine   Result Value Ref Range    Sodium Urine mmol/L <20 mmol/L   CT Abdomen Pelvis w/o & w Contrast    Narrative    Exam: Computed tomographic angiography of the abdomen and pelvis  without and with contrast, dated 7/31/2022 3:30 PM    Clinical information:  GIB    Technique: Helical scans through the abdomen and pelvis obtained  before the administration of intravenous contrast media and following  the injection of contrast media in the late arterial and delayed  phase.     Contrast: 101 mL Isovue 370    DLP: 741 mGy*cm    Comparison study: Ultrasound 6/28/2021    Findings:      The abdominal aorta is normal in caliber. Moderate atherosclerotic  disease of the abdominal  aorta and its branches. Extensive  portosystemic collaterals including esophageal and gastric varices.  Circumaortic left renal vein anatomical variant.  The celiac axis, SMA and SAI are patent. The renal arteries are patent  bilaterally.   The splenic vein, portal vein, SMV and renal veins are  patent.   The hepatic veins and IVC are patent.     Abdomen and pelvis:   Cirrhotic morphology of liver. Cholecystectomy. Punctate  calcifications within the head of the pancreas. Spleen and adrenal  glands appear within normal limits. Bilateral renal cysts. Normal  caliber small and large bowel. Urinary bladder is unremarkable. Pelvic  phleboliths. No free fluid or free air. No lymphadenopathy.    Lower thorax: Minimal bibasilar dependent atelectasis, otherwise clear  lung bases.    Bones and soft tissues: Soft tissue stranding about the umbilicus. No  acute or suspicious osseous abnormality.      Impression    Impression:  Hepatic cirrhosis with evidence of portal hypertension including  extensive portosystemic collaterals of esophageal and gastroesophageal  varices. No evidence of active extravasation. However esophageal  varices are not fully visualized. Can consider upper endoscopy for  continued clinical concern of ruptured esophageal varices.    I have personally reviewed the examination and initial interpretation  and I agree with the findings.    JAYCEE RODRIGUEZ MD         SYSTEM ID:  [id number]   Glucose by meter   Result Value Ref Range    GLUCOSE BY METER POCT 109 (H) 70 - 99 mg/dL   Lactic acid whole blood   Result Value Ref Range    Lactic Acid 5.5 (HH) 0.7 - 2.0 mmol/L   Potassium   Result Value Ref Range    Potassium 5.0 3.4 - 5.3 mmol/L   Glucose by meter   Result Value Ref Range    GLUCOSE BY METER POCT 299 (H) 70 - 99 mg/dL   CBC with platelets   Result Value Ref Range    WBC Count 10.4 4.0 - 11.0 10e3/uL    RBC Count 1.81 (L) 4.40 - 5.90 10e6/uL    Hemoglobin 6.1 (LL) 13.3 - 17.7 g/dL    Hematocrit 19.3  (L) 40.0 - 53.0 %     (H) 78 - 100 fL    MCH 33.7 (H) 26.5 - 33.0 pg    MCHC 31.6 31.5 - 36.5 g/dL    RDW 15.3 (H) 10.0 - 15.0 %    Platelet Count 64 (L) 150 - 450 10e3/uL   Comprehensive metabolic panel   Result Value Ref Range    Sodium 132 (L) 136 - 145 mmol/L    Potassium 4.6 3.4 - 5.3 mmol/L    Creatinine 3.25 (H) 0.67 - 1.17 mg/dL    Urea Nitrogen 69.1 (H) 8.0 - 23.0 mg/dL    Chloride 96 (L) 98 - 107 mmol/L    Carbon Dioxide (CO2) 15 (L) 22 - 29 mmol/L    Anion Gap 21 (H) 7 - 15 mmol/L    Glucose 342 (H) 70 - 99 mg/dL    Calcium 7.7 (L) 8.8 - 10.2 mg/dL    Protein Total 4.6 (L) 6.4 - 8.3 g/dL    Albumin 2.2 (L) 3.5 - 5.2 g/dL    Bilirubin Total 1.5 (H) <=1.2 mg/dL    Alkaline Phosphatase 44 40 - 129 U/L     (H) 10 - 50 U/L    ALT 69 (H) 10 - 50 U/L    GFR Estimate 20 (L) >60 mL/min/1.73m2   Magnesium   Result Value Ref Range    Magnesium 2.4 (H) 1.7 - 2.3 mg/dL   Glucose by meter   Result Value Ref Range    GLUCOSE BY METER POCT 363 (H) 70 - 99 mg/dL   Glucose by meter   Result Value Ref Range    GLUCOSE BY METER POCT 207 (H) 70 - 99 mg/dL   Glucose by meter   Result Value Ref Range    GLUCOSE BY METER POCT 180 (H) 70 - 99 mg/dL   Lactic acid whole blood   Result Value Ref Range    Lactic Acid 7.4 (HH) 0.7 - 2.0 mmol/L   Potassium   Result Value Ref Range    Potassium 5.0 3.4 - 5.3 mmol/L   Hemoglobin   Result Value Ref Range    Hemoglobin 7.0 (L) 13.3 - 17.7 g/dL   Prepare red blood cells (unit)   Result Value Ref Range    CROSSMATCH Compatible     UNIT ABO/RH O Pos     Unit Number [id number]     Unit Status Issued     Blood Component Type Red Blood Cells     Product Code [id number]     CODING SYSTEM [id number]     UNIT TYPE ISBT 5100     ISSUE DATE AND TIME [id number]    Hemoglobin   Result Value Ref Range    Hemoglobin 7.6 (L) 13.3 - 17.7 g/dL   Lactic acid whole blood   Result Value Ref Range    Lactic Acid 3.0 (H) 0.7 - 2.0 mmol/L   Hemoglobin   Result Value Ref Range    Hemoglobin 8.4 (L)  13.3 - 17.7 g/dL   Blood gas venous   Result Value Ref Range    pH Venous 7.40 7.32 - 7.43    pCO2 Venous 39 (L) 40 - 50 mm Hg    pO2 Venous 38 25 - 47 mm Hg    Bicarbonate Venous 24 21 - 28 mmol/L    Base Excess/Deficit (+/-) -1.1 -7.7 - 1.9 mmol/L    FIO2 21    XR Chest Port 1 View    Narrative    EXAM: XR CHEST PORT 1 VIEW  8/1/2022 1:45 AM     HISTORY:  Central line placement       COMPARISON:  6/29/2021    TECHNIQUE: Portable frontal radiograph of the chest.    FINDINGS: Right IJ central venous catheter tip projects over the high  right atrium.    The mediastinal contours are normal. The heart is normal in size.  Unchanged perihilar opacities. No acute bone or soft tissue  abnormality. Surgical clips project over the left axilla.      Impression    IMPRESSION: Right IJ central venous catheter tip projects over the  high right atrium.     I have personally reviewed the examination and initial interpretation  and I agree with the findings.    NOLBERTO MARIN MD         SYSTEM ID:  [id number]   CBC with platelets   Result Value Ref Range    WBC Count 11.5 (H) 4.0 - 11.0 10e3/uL    RBC Count 2.64 (L) 4.40 - 5.90 10e6/uL    Hemoglobin 8.4 (L) 13.3 - 17.7 g/dL    Hematocrit 24.9 (L) 40.0 - 53.0 %    MCV 94 78 - 100 fL    MCH 31.8 26.5 - 33.0 pg    MCHC 33.7 31.5 - 36.5 g/dL    RDW 16.3 (H) 10.0 - 15.0 %    Platelet Count 76 (L) 150 - 450 10e3/uL   Basic metabolic panel   Result Value Ref Range    Creatinine 3.21 (H) 0.67 - 1.17 mg/dL    Sodium 135 (L) 136 - 145 mmol/L    Potassium 5.1 3.4 - 5.3 mmol/L    Urea Nitrogen 71.1 (H) 8.0 - 23.0 mg/dL    Chloride 102 98 - 107 mmol/L    Carbon Dioxide (CO2) 23 22 - 29 mmol/L    Anion Gap 10 7 - 15 mmol/L    Glucose 109 (H) 70 - 99 mg/dL    GFR Estimate 21 (L) >60 mL/min/1.73m2    Calcium 7.9 (L) 8.8 - 10.2 mg/dL   INR   Result Value Ref Range    INR 2.09 (H) 0.85 - 1.15   CK total   Result Value Ref Range     39 - 308 U/L   Central line    Narrative    Gerry Castañeda MD      8/1/2022  3:53 AM  [facility]    Central line    Date/Time: 8/1/2022 3:51 AM  Performed by: Gerry Castañeda MD  Authorized by: Flaco Gaxiola MD   Indications: vascular access      UNIVERSAL PROTOCOL   Site Marked: NA  Prior Images Obtained and Reviewed:  Yes  Required items: Required blood products, implants, devices and special   equipment available    Patient identity confirmed:  Verbally with patient and provided   demographic data  NA - No sedation, light sedation, or local anesthesia  Confirmation Checklist:  Patient's identity using two indicators,   procedure was appropriate and matched the consent or emergent situation,   relevant allergies and correct equipment/implants were available  Time out: Immediately prior to the procedure a time out was called    Universal Protocol: the Joint Commission Universal Protocol was followed    Preparation: Patient was prepped and draped in usual sterile fashion       ANESTHESIA    Anesthesia: Local infiltration      SEDATION    Patient Sedated: No      Procedure prep: skin preparation performed.  Skin prep agent dried: skin prep agent completely dried prior to procedure  Sterile barriers: all five maximum sterile barriers used - cap, mask,   sterile gown, sterile gloves, and large sterile sheet  Hand hygiene: hand hygiene performed prior to central venous catheter   insertion  Patient position: flat  Catheter type: triple lumen  Pre-procedure: landmarks identified  Ultrasound guidance: yes  Sterile ultrasound techniques: sterile gel and sterile probe covers were   used  Number of attempts: 1  Successful placement: yes  Post-procedure: line sutured and dressing applied  Assessment: blood return through all ports,  placement verified by x-ray   and no pneumothorax on x-ray      PROCEDURE    Patient Tolerance:  Patient tolerated the procedure well with no immediate   complications  Length of time physician/provider  present for 1:1 monitoring during   sedation: 0 (N/A - no sedation)   Glucose by meter   Result Value Ref Range    GLUCOSE BY METER POCT 129 (H) 70 - 99 mg/dL   Lactic acid whole blood   Result Value Ref Range    Lactic Acid 2.1 (H) 0.7 - 2.0 mmol/L   Magnesium   Result Value Ref Range    Magnesium 2.0 1.7 - 2.3 mg/dL   Phosphorus   Result Value Ref Range    Phosphorus 4.4 2.5 - 4.5 mg/dL   US Abdomen Limited    Narrative    EXAMINATION: US ABDOMEN LIMITED, 8/1/2022 8:48 AM     COMPARISON: CT 7/31/2022    HISTORY: Decompensated ETOH cirrhosis - evaluate for presence of  ascites    TECHNIQUE: Gray scale ultrasound of the abdomen.    FINDINGS:  A targeted ultrasound of all 4 abdominal quadrants was performed and  demonstrated small amount of ascites in the both lower quadrants.      Impression    IMPRESSION:  Small volume ascites in the left and right lower  quadrants.    I have personally reviewed the examination and initial interpretation  and I agree with the findings.    LANE MAURER MD         SYSTEM ID:  [id number]   UPPER GI ENDOSCOPY   Result Value Ref Range    Upper GI Endoscopy       [address] [address] [phone]     Endoscopy Department  _______________________________________________________________________________  Patient Name: Gilles Malloy       Procedure Date: 8/1/2022 9:25 AM  MRN: [id number]                       Account Number: [id number]  YOB: 1960               Admit Type: Inpatient  Age: 62                               Room: [place] [address]  Gender: Male                          Note Status: Finalized  Attending MD: AARON PÉREZ MD Pause for the Cause: pause for   cause done  Total Sedation Time: 46 minutes         _______________________________________________________________________________     Procedure:             Upper GI endoscopy  Indications:           Acute post hemorrhagic anemia,  Coffee-ground emesis  Providers:             AARON PÉREZ MD, Tegan Pope RN, SIVAKUMAR LAINEZ MD  Patient Profil e:       62 yom, EtOH use disorder, alcohol-related cirrhosis,                          history of EVB, presenting with hemorrhagic shock and                          acute blood loss anemia presumed secondary to EVH now                          undergoing upper endoscopy in the ICU  Referring MD:            Medicines:             Midazolam 6 mg IV, Fentanyl 200 micrograms IV  Complications:         No immediate complications.  _______________________________________________________________________________  Procedure:             Pre-Anesthesia Assessment:                         - Prior to the procedure, a History and Physical was                          performed, and patient medications and allergies were                          reviewed. The patient is competent. The risks and                          benefits of the procedure and the sedation options and                          risks were discussed with the patient. All questions                          were answered and [facility] consent was obtained.                          Patient identification and proposed procedure were                          verified by the physician and the nurse in the                          procedure room. Mental Status Examination: alert and                          oriented. Airway Examination: normal oropharyngeal                          airway and neck mobility. Respiratory Examination:                          clear to auscultation. CV Examination: normal.                          Prophylactic Antibiotics: The patient does not require                          prophylactic antibiotics. Prior Anticoagulants: The                          patient has taken no anticoagulant or antiplatelet                          agents. ASA Grade Assessment: III - A  patient with                          severe systemic disease. After reviewing the risks and                          benefits, the patient was deemed in satisfactory                          condition to undergo the procedure.  The anesthesia                          plan was to use moderate sedation / analgesia                          (conscious sedation). Immediately prior to                          administration of medications, the patient was                          re-assessed for adequacy to receive sedatives. The                          heart rate, respiratory rate, oxygen saturations,                          blood pressure, adequacy of pulmonary ventilation, and                          response to care were monitored throughout the                          procedure. The physical status of the patient was                          re-assessed after the procedure.                         After obtaining informed consent, the endoscope was                          passed under direct vision. Throughout the procedure,                          the patient's blood pressure, pulse, and oxygen                          saturations were monitored continuously. The Endoscope                          was i ntroduced through the mouth, and advanced to the                          second part of duodenum.                                                                                   Findings:       The Z-line was regular and was found 38 cm from the incisors.       Two columns of varices with stigmata of recent bleeding were found in        the proximal esophagus and in the mid esophagus, 27 to 37 cm from the        incisors. Red mendoza signs were present. Scarring from prior treatment was        visible. Three bands were successfully placed with complete eradication,        resulting in deflation of varices. There was no bleeding at the end of        the procedure. One column of varices was large (>5mm) bulbous,  easily        visible, and contained multiple areas of high risk stigmata. This column        was banded three times successfully. The opposite colum was small, had        no visible high risk stigmata, and was too scarred to adequately suction        for banding on multiple atte mpts.       Hematin (altered blood/coffee-ground-like material) was found in the        entire examined stomach.       The cardia and gastric fundus were normal on retroflexion.       Moderate portal hypertensive gastropathy was found in the entire        examined stomach.       Hematin (altered blood/coffee-ground-like material) was found in the        entire duodenum.                                                                                   Moderate Sedation:       Moderate (conscious) sedation was administered by the endoscopy nurse        and supervised by the endoscopist. The patient's oxygen saturation,        heart rate, blood pressure and response to care were monitored. Total        physician intraservice time was 46 minutes.  Impression:            - Large (> 5 mm) esophageal varices with stigmata of                          recent bleeding. Completely eradicated. Banded.                         - Z-line regular, 38 cm from the incisors.                         - Port al hypertensive gastropathy.                         - No specimens collected.                         - No gross lesions in the ampulla, in the duodenal                          bulb, in the first portion of the duodenum and in the                          second portion of the duodenum.  Recommendation:        - Clear liquid diet x 24 hours, then soft diet x 24                          hours, then ADAT if no pain                         - Continue octroetide for a total of 72 hours                         - 7 days of prophylactic antibiotics                         - Once daily PPI sufficient                         - Transfuse to defend hgb of 7                          - Target MAP of at least 60 if mentation intact                                                                                     Desire Esquivel MD  _______________________________  DESIRE ESQUIVEL MD  8/1/2022 1:15:39 PM  I was physically present for the entire viewing portion of the exam.  _____ _____________________  Signature of teaching physician  B4c/Katherin ESQUIVEL MD    ________________________  SIVAKUMAR LAINEZ MD  Number of Addenda: 0    Note Initiated On: 8/1/2022 9:25 AM     UA reflex to Microscopic and Culture    Specimen: Urine, Clean Catch   Result Value Ref Range    Color Urine Yellow Colorless, Straw, Light Yellow, Yellow    Appearance Urine Clear Clear    Glucose Urine Negative Negative mg/dL    Bilirubin Urine Negative Negative    Ketones Urine Negative Negative mg/dL    Specific Gravity Urine 1.024 1.003 - 1.035    Blood Urine Negative Negative    pH Urine 5.5 5.0 - 7.0    Protein Albumin Urine Negative Negative mg/dL    Urobilinogen Urine Normal Normal, 2.0 mg/dL    Nitrite Urine Negative Negative    Leukocyte Esterase Urine Negative Negative    Narrative    Microscopic not indicated   Fractional excretion of sodium    Narrative    The following orders were created for panel order Fractional excretion of sodium.  Procedure                               Abnormality         Status                     ---------                               -----------         ------                     Fractional Excretion of ...[[id number]]                                                   Please view results for these tests on the individual orders.   CBC with platelets   Result Value Ref Range    WBC Count 10.5 4.0 - 11.0 10e3/uL    RBC Count 2.70 (L) 4.40 - 5.90 10e6/uL    Hemoglobin 8.9 (L) 13.3 - 17.7 g/dL    Hematocrit 25.8 (L) 40.0 - 53.0 %    MCV 96 78 - 100 fL    MCH 33.0 26.5 - 33.0 pg    MCHC 34.5 31.5 - 36.5 g/dL    RDW 16.7 (H) 10.0 - 15.0 %    Platelet  Count 80 (L) 150 - 450 10e3/uL   Comprehensive metabolic panel   Result Value Ref Range    Sodium 134 (L) 136 - 145 mmol/L    Potassium 5.7 (H) 3.4 - 5.3 mmol/L    Creatinine 2.79 (H) 0.67 - 1.17 mg/dL    Urea Nitrogen 71.6 (H) 8.0 - 23.0 mg/dL    Chloride 104 98 - 107 mmol/L    Carbon Dioxide (CO2) 22 22 - 29 mmol/L    Anion Gap 8 7 - 15 mmol/L    Glucose 149 (H) 70 - 99 mg/dL    Calcium 7.9 (L) 8.8 - 10.2 mg/dL    Protein Total 5.1 (L) 6.4 - 8.3 g/dL    Albumin 2.4 (L) 3.5 - 5.2 g/dL    Bilirubin Total 2.5 (H) <=1.2 mg/dL    Alkaline Phosphatase 55 40 - 129 U/L     (H) 10 - 50 U/L     (H) 10 - 50 U/L    GFR Estimate 25 (L) >60 mL/min/1.73m2

## 2022-08-01 NOTE — PLAN OF CARE
[facility]  Cross-Cover Note - MICU    10:35 PM  Since 7pm this evening on shift change, pt has received an additional 3L LR and 1u pRBC's and MAP's continuing to be mostly upper 40's vs lower 50's. He has now gotten a total of 5L of isotonic fluids and 3u pRBC's. Hemoglobin after 2nd unit of pRBC's was 7.0, after 3rd unit was 7.6. Continues to mentate well, looks overall well-appearing, no complaints, benign abdominal exam, no symptoms of blood loss. Evaluated again by overnight GI coverage, reassured for now with Hgb trend, lack of further recurrent symptoms, and that he remains well-appearing. Will continue to defer endoscopic evaluation until the morning.    Will go ahead and start peripheral levophed. Will consider utility of art line for closer hemodynamic monitoring +/- CVC access.    Gerry Castañeda MD  Internal Medicine PGY-2  MICU Nights

## 2022-08-01 NOTE — PROGRESS NOTES
MEDICAL ICU PROGRESS NOTE  08/01/2022      Date of Service (when I saw the patient): 08/01/2022    ASSESSMENT: Gilles Malloy is a 62 year old male with PMH of decompensated ETOH cirrhosis c/b esophageal varices and ascites who was admitted on 7/31/2022 for severe hypotension resulting from hematemesis and melena 2/2 bleeding varices, admitted to the MICU for hypotension and concern for evolution into hemorrhagic shock.     Today  - On levo overnight once reaching floor - stable level, RIJ placed  - EGD without active EV bleeding - 3 bands placed  - Clear liquid diet  - Restarted PTA norco, tamoxifen  - New murmur - echo ordered    PLAN:     Neuro:  # Pain and sedation  - No pain on admission  - PTA norco PRN     # Asterixis, mild  Noted on admission.  No other neurologic deficits.  Etiology most likely 2/2 HE, given decompensated cirrhosis and active drinking, though could also be confounded PE sign with active withdrawal and tremors.  Less likely uremic component (BUN 75 on admission).  Reassuringly, patient Ox4, without other neurological deficits.    - CTM - will consider lactulose treatment long-term, though will avoid currently in setting of shock     #Alcohol withdrawal  Patient still active drinker - drinks two bottles of wine/week.  Last drink 7/30.  Has tremor on exam today, but no other signs of withdrawal.  No history of alcohol withdrawal, hospitalizations for withdrawal, or withdrawal seizures.  - [facility] protocol     Pulmonology  - No active issues     Cardiovascular:  #Shock, likely 2/2  #Hypovolemia  #Hemorrhagic 2/2 upper GIB (portal hypertensive gastropathy, non-bleeding EV)  Presented to ED with 2 episodes of coffee-ground emesis.  EGD performed 8/1, which showed portal hypertensive gastropathy with contact oozing, as well as high risk EV without active bleeding, s/p 3 bands placed.   Also possible component of hypovolemia, given patient with decreased oral intake over last several days, and  on furosemide and spironolactone for his ascites.  Further, will assess for cardiac function, given undifferentiated shock.  Lactate downtrending since admission, though still requiring levophed to maintain MAPs.  - RIJ - levophed  - MAP goal 60mmHg  - Treatment of shock etiologies, as below:   - Albumin 25g 5% given   - EGD with banding   - Echocardiogram ordered    #Systolic murmur  [place] incidentally on exam.  No recent echocardiogram, and given shock picture, should further assess.  - Echocardiogram ordered     GI/Nutrition:  # Upper GIB, melena in the setting of:  # Decompensated EtOH cirrhosis c/b esophageal varices  Presented to ED with 2 episodes of coffee-ground emesis, concerning for upper GIB.  EGD performed 8/1, which showed portal hypertensive gastropathy with contact oozing, as well as high risk EV without active bleeding, s/p 3 bands placed.  - Hepatology consulted, appreciate recs:   - CLD today   - Soft diet tomorrow  - 2 large bore IVs in place  - Protonix switched to qday  - Octreotide gtt - end date 8/4  - CTX 1g q24h - end date 8/7  - Hgb transfuse <7.  Currently CBC q8h, will continue spacing if stable     #Decompensated ETOH cirrhosis c/b esophageal varices, ascites  No fluid wave seen on exam, though patient with history of ascites.  RUQ without large pocket of ascites.  - CTX, as above  -Hold PTA spironolactone, furosemide given hypovolemic on exam     Renal/Fluids/Electrolytes:  # Acute kidney failure  # Hypovolemic hyponatremia  Baseline Cr ~0.8, presented with creatinine of 3.61 - likely multifactorial, including GIB, hypovolemia (decreased oral intake), PTA diuretics; also unclear chronicity, given 1 year since seeing patient's creatinine baseline, though seems most likely acute.  Still self-reported good urine output.  Patient received total of 4L crystaloids upon admission, though still appears hypovolemic.  No ramos placed on admission, despite ED order.  Initial UA with hyaline casts  (showing hypovolemia); current UA with specific gravity 1.024, showing ability to concentrate, so unlikely ATN at this time.  - Nephrology consulted, appreciate recs  - Albumin 5% 25g x 1 ordered  - CMP spaced to q8h  - Bladder scans qshift  - Will do bedside renal ultrasound to assess for hydronephrosis  - Follow strict I/Os, daily weights     # Hyperkalemia - improved  K+ 7 on admission. Shifted with insulin, high dose albuterol, and lasix, now down to 5.  No peaked t waves seen.  Given no need to continue shifting, likely this is iatrogenic from spironolactione, in setting of hypovolemia.   - telemetry   - CMP q8h     Endocrine:  -no active issues  - POCT glucose while NPO  - Hypoglycemia protocol     ID:  - No active issues     Hematology:    # Acute blood loss anemia  Hgb 5.7 on admission, s/p 2 units of pRBCs.  - Hgb checks q8h overnight     # Malignant neoplasm of breast, s/p left mastectomy 2019  - PTA tamoxifen - HELD given NPO status     Musculoskeletal:  No active issues  - PT/OT consulted     Skin:  No active issues    General Cares/Prophylaxis:    DVT Prophylaxis: Pneumatic Compression Devices  GI Prophylaxis: PPI  Restraints: n/a  Family Communication: Fiance updated  Code Status: FULL    Lines/tubes/drains:  - RIJ  - 3 PIV    Disposition:  - Medical ICU     Patient seen and findings/plan discussed with medical ICU staff, Dr. Rinaldi.    OSCAR Nobles MD  Internal Medicine Resident  [facility]    ====================================  INTERVAL HISTORY:   Patient arrived to MICU yestereday evening, with low MAPs despite fluids in ED.  Was started on levophed, and RIJ needed to be placed.  Levo currently on 0.1, has remained on this dose throughout the night.    Since admission to MICU, no further episodes of nausea, vomiting, or bowel movements.  Patient feels well overall, denies anxiety.  Further denies chest pain, SOB, leg swelling.  No pain, including abdominal pain.    OBJECTIVE:    1. VITAL SIGNS:   Temp:  [97.9  F (36.6  C)-98.7  F (37.1  C)] 98.1  F (36.7  C)  Pulse:  [] 90  Resp:  [0-68] 13  BP: ()/(35-86) 104/49  SpO2:  [90 %-100 %] 97 %  Resp: 13    2. INTAKE/ OUTPUT:   I/O last 3 completed shifts:  In: 4578.45 [I.V.:528.45; IV Piggyback:3150]  Out: 1210 [Urine:1010; Emesis/NG output:200]    3. PHYSICAL EXAMINATION:  Constitutional: NAD, pleasant, cooperative.  Increased rate of speech.  Some tremors seen in upper extremities, also seen upon admission.  HEENT: Sclera anicteric, Normal oropharynx without ulcers or exudate, MMM  CV: RRR, no murmurs, gallops or rubs. JVD normal   Respiratory: CTAB, no wheezing, crackles or rales. Non-labored.  On RA  Abd: Soft, mild distension without fluid wave, +bs, no HSM.  Nontender.  Skin: No lesions or rashes over exposed areas, normal color, texture and turgor  Neuro: AAO x 3.  Mild asterixis seen.    4. LABS:   Arterial Blood Gases   No lab results found in last 7 days.  Complete Blood Count   Recent Labs   Lab 08/01/22  0341 07/31/22  2331 07/31/22  2213 07/31/22 2005 07/31/22 1726 07/31/22  1349   WBC 11.5*  --   --   --  10.4 12.0*   HGB 8.4* 8.4* 7.6* 7.0* 6.1* 5.7*   PLT 76*  --   --   --  64* 81*     Basic Metabolic Panel  Recent Labs   Lab 08/01/22  0812 08/01/22  0341 07/31/22 2005 07/31/22  1908 07/31/22  1816 07/31/22  1737 07/31/22  1726 07/31/22  1711 07/31/22  1707 07/31/22  1631 07/31/22  1349   NA  --  135*  --   --   --   --  132*  --   --   --  132*   POTASSIUM  --  5.1 5.0  --   --   --  4.6  --  5.0  --  7.0*  7.0*   CHLORIDE  --  102  --   --   --   --  96*  --   --   --  98   CO2  --  23  --   --   --   --  15*  --   --   --  18*   BUN  --  71.1*  --   --   --   --  69.1*  --   --   --  73.3*   CR  --  3.21*  --   --   --   --  3.25*  --   --   --  3.61*   * 109*  --  180* 207*   < > 342*   < >  --    < > 86    < > = values in this interval not displayed.     Liver Function Tests  Recent Labs   Lab  08/01/22  0341 07/31/22  1726 07/31/22  1354 07/31/22  1349   AST  --  130*  --  129*   ALT  --  69*  --  71*   ALKPHOS  --  44  --  52   BILITOTAL  --  1.5*  --  1.9*   ALBUMIN  --  2.2*  --  2.5*   INR 2.09*  --  2.31*  --      Coagulation Profile  Recent Labs   Lab 08/01/22  0341 07/31/22  1354   INR 2.09* 2.31*   PTT  --  44*       5. RADIOLOGY:   Recent Results (from the past 24 hour(s))   CT Abdomen Pelvis w/o & w Contrast    Narrative    Exam: Computed tomographic angiography of the abdomen and pelvis  without and with contrast, dated 7/31/2022 3:30 PM    Clinical information:  GIB    Technique: Helical scans through the abdomen and pelvis obtained  before the administration of intravenous contrast media and following  the injection of contrast media in the late arterial and delayed  phase.     Contrast: 101 mL Isovue 370    DLP: 741 mGy*cm    Comparison study: Ultrasound 6/28/2021    Findings:      The abdominal aorta is normal in caliber. Moderate atherosclerotic  disease of the abdominal aorta and its branches. Extensive  portosystemic collaterals including esophageal and gastric varices.  Circumaortic left renal vein anatomical variant.  The celiac axis, SMA and SAI are patent. The renal arteries are patent  bilaterally.   The splenic vein, portal vein, SMV and renal veins are  patent.   The hepatic veins and IVC are patent.     Abdomen and pelvis:   Cirrhotic morphology of liver. Cholecystectomy. Punctate  calcifications within the head of the pancreas. Spleen and adrenal  glands appear within normal limits. Bilateral renal cysts. Normal  caliber small and large bowel. Urinary bladder is unremarkable. Pelvic  phleboliths. No free fluid or free air. No lymphadenopathy.    Lower thorax: Minimal bibasilar dependent atelectasis, otherwise clear  lung bases.    Bones and soft tissues: Soft tissue stranding about the umbilicus. No  acute or suspicious osseous abnormality.      Impression     Impression:  Hepatic cirrhosis with evidence of portal hypertension including  extensive portosystemic collaterals of esophageal and gastroesophageal  varices. No evidence of active extravasation. However esophageal  varices are not fully visualized. Can consider upper endoscopy for  continued clinical concern of ruptured esophageal varices.    I have personally reviewed the examination and initial interpretation  and I agree with the findings.    JAYCEE RODRIGUEZ MD         SYSTEM ID:  [id number]   XR Chest Port 1 View    Narrative    EXAM: XR CHEST PORT 1 VIEW  8/1/2022 1:45 AM     HISTORY:  Central line placement       COMPARISON:  6/29/2021    TECHNIQUE: Portable frontal radiograph of the chest.    FINDINGS: Right IJ central venous catheter tip projects over the high  right atrium.    The mediastinal contours are normal. The heart is normal in size.  Unchanged perihilar opacities. No acute bone or soft tissue  abnormality. Surgical clips project over the left axilla.      Impression    IMPRESSION: Right IJ central venous catheter tip projects over the  high right atrium.     I have personally reviewed the examination and initial interpretation  and I agree with the findings.    NOLBERTO MARIN MD         SYSTEM ID:  [id number]

## 2022-08-02 ENCOUNTER — APPOINTMENT (OUTPATIENT)
Dept: OCCUPATIONAL THERAPY | Facility: CLINIC | Age: 62
DRG: 432 | End: 2022-08-02
Payer: COMMERCIAL

## 2022-08-02 LAB
ALBUMIN SERPL BCG-MCNC: 3.2 G/DL (ref 3.5–5.2)
ALP SERPL-CCNC: 43 U/L (ref 40–129)
ALT SERPL W P-5'-P-CCNC: 116 U/L (ref 10–50)
ANION GAP SERPL CALCULATED.3IONS-SCNC: 5 MMOL/L (ref 7–15)
AST SERPL W P-5'-P-CCNC: 200 U/L (ref 10–50)
BILIRUB SERPL-MCNC: 2.2 MG/DL
BLD PROD TYP BPU: NORMAL
BLOOD COMPONENT TYPE: NORMAL
BUN SERPL-MCNC: 53.8 MG/DL (ref 8–23)
CALCIUM SERPL-MCNC: 8.1 MG/DL (ref 8.8–10.2)
CHLORIDE SERPL-SCNC: 106 MMOL/L (ref 98–107)
CODING SYSTEM: NORMAL
CREAT SERPL-MCNC: 1.86 MG/DL (ref 0.67–1.17)
CROSSMATCH: NORMAL
DEPRECATED HCO3 PLAS-SCNC: 25 MMOL/L (ref 22–29)
ERYTHROCYTE [DISTWIDTH] IN BLOOD BY AUTOMATED COUNT: 16.9 % (ref 10–15)
GFR SERPL CREATININE-BSD FRML MDRD: 40 ML/MIN/1.73M2
GLUCOSE BLDC GLUCOMTR-MCNC: 121 MG/DL (ref 70–99)
GLUCOSE BLDC GLUCOMTR-MCNC: 148 MG/DL (ref 70–99)
GLUCOSE SERPL-MCNC: 124 MG/DL (ref 70–99)
HCT VFR BLD AUTO: 24.9 % (ref 40–53)
HGB BLD-MCNC: 6.9 G/DL (ref 13.3–17.7)
HGB BLD-MCNC: 8.1 G/DL (ref 13.3–17.7)
ISSUE DATE AND TIME: NORMAL
MCH RBC QN AUTO: 31.3 PG (ref 26.5–33)
MCHC RBC AUTO-ENTMCNC: 32.5 G/DL (ref 31.5–36.5)
MCV RBC AUTO: 96 FL (ref 78–100)
PLATELET # BLD AUTO: 57 10E3/UL (ref 150–450)
POTASSIUM SERPL-SCNC: 4.9 MMOL/L (ref 3.4–5.3)
PROT SERPL-MCNC: 5.3 G/DL (ref 6.4–8.3)
RBC # BLD AUTO: 2.59 10E6/UL (ref 4.4–5.9)
SODIUM SERPL-SCNC: 136 MMOL/L (ref 136–145)
UNIT ABO/RH: NORMAL
UNIT NUMBER: NORMAL
UNIT STATUS: NORMAL
UNIT TYPE ISBT: 5100
WBC # BLD AUTO: 7.6 10E3/UL (ref 4–11)

## 2022-08-02 PROCEDURE — 250N000011 HC RX IP 250 OP 636: Performed by: STUDENT IN AN ORGANIZED HEALTH CARE EDUCATION/TRAINING PROGRAM

## 2022-08-02 PROCEDURE — 97165 OT EVAL LOW COMPLEX 30 MIN: CPT | Mod: GO

## 2022-08-02 PROCEDURE — 80053 COMPREHEN METABOLIC PANEL: CPT

## 2022-08-02 PROCEDURE — 258N000003 HC RX IP 258 OP 636: Performed by: STUDENT IN AN ORGANIZED HEALTH CARE EDUCATION/TRAINING PROGRAM

## 2022-08-02 PROCEDURE — 250N000013 HC RX MED GY IP 250 OP 250 PS 637: Performed by: STUDENT IN AN ORGANIZED HEALTH CARE EDUCATION/TRAINING PROGRAM

## 2022-08-02 PROCEDURE — 250N000013 HC RX MED GY IP 250 OP 250 PS 637

## 2022-08-02 PROCEDURE — 85027 COMPLETE CBC AUTOMATED: CPT

## 2022-08-02 PROCEDURE — 99233 SBSQ HOSP IP/OBS HIGH 50: CPT | Mod: GC | Performed by: INTERNAL MEDICINE

## 2022-08-02 PROCEDURE — 97530 THERAPEUTIC ACTIVITIES: CPT | Mod: GO

## 2022-08-02 PROCEDURE — 120N000002 HC R&B MED SURG/OB UMMC

## 2022-08-02 PROCEDURE — 999N000147 HC STATISTIC PT IP EVAL DEFER

## 2022-08-02 PROCEDURE — 99232 SBSQ HOSP IP/OBS MODERATE 35: CPT | Mod: GC | Performed by: INTERNAL MEDICINE

## 2022-08-02 PROCEDURE — 250N000011 HC RX IP 250 OP 636: Mod: JA | Performed by: STUDENT IN AN ORGANIZED HEALTH CARE EDUCATION/TRAINING PROGRAM

## 2022-08-02 PROCEDURE — C9113 INJ PANTOPRAZOLE SODIUM, VIA: HCPCS | Performed by: STUDENT IN AN ORGANIZED HEALTH CARE EDUCATION/TRAINING PROGRAM

## 2022-08-02 PROCEDURE — P9016 RBC LEUKOCYTES REDUCED: HCPCS

## 2022-08-02 RX ORDER — GABAPENTIN 100 MG/1
100 CAPSULE ORAL 3 TIMES DAILY
Status: DISCONTINUED | OUTPATIENT
Start: 2022-08-02 | End: 2022-08-03

## 2022-08-02 RX ORDER — ALPRAZOLAM 0.25 MG
0.5 TABLET ORAL 3 TIMES DAILY PRN
Status: DISCONTINUED | OUTPATIENT
Start: 2022-08-02 | End: 2022-08-03 | Stop reason: HOSPADM

## 2022-08-02 RX ADMIN — FOLIC ACID 1 MG: 1 TABLET ORAL at 07:57

## 2022-08-02 RX ADMIN — HYDROCODONE BITARTRATE AND ACETAMINOPHEN 1 TABLET: 10; 325 TABLET ORAL at 07:57

## 2022-08-02 RX ADMIN — TAMOXIFEN CITRATE 20 MG: 20 TABLET ORAL at 07:57

## 2022-08-02 RX ADMIN — THIAMINE HCL TAB 100 MG 100 MG: 100 TAB at 07:57

## 2022-08-02 RX ADMIN — PANTOPRAZOLE SODIUM 40 MG: 40 INJECTION, POWDER, FOR SOLUTION INTRAVENOUS at 06:48

## 2022-08-02 RX ADMIN — CEFTRIAXONE SODIUM 1 G: 1 INJECTION, POWDER, FOR SOLUTION INTRAMUSCULAR; INTRAVENOUS at 13:59

## 2022-08-02 RX ADMIN — GABAPENTIN 100 MG: 100 CAPSULE ORAL at 14:00

## 2022-08-02 RX ADMIN — GABAPENTIN 100 MG: 100 CAPSULE ORAL at 10:56

## 2022-08-02 RX ADMIN — HYDROCODONE BITARTRATE AND ACETAMINOPHEN 1 TABLET: 10; 325 TABLET ORAL at 19:48

## 2022-08-02 RX ADMIN — Medication 1 TABLET: at 07:57

## 2022-08-02 RX ADMIN — OCTREOTIDE ACETATE 50 MCG/HR: 200 INJECTION, SOLUTION INTRAVENOUS; SUBCUTANEOUS at 18:17

## 2022-08-02 RX ADMIN — GABAPENTIN 100 MG: 100 CAPSULE ORAL at 19:48

## 2022-08-02 RX ADMIN — ALPRAZOLAM 0.5 MG: 0.25 TABLET ORAL at 19:48

## 2022-08-02 ASSESSMENT — ACTIVITIES OF DAILY LIVING (ADL)
ADLS_ACUITY_SCORE: 25
ADLS_ACUITY_SCORE: 28
ADLS_ACUITY_SCORE: 28
ADLS_ACUITY_SCORE: 24
ADLS_ACUITY_SCORE: 25
ADLS_ACUITY_SCORE: 22
ADLS_ACUITY_SCORE: 28
ADLS_ACUITY_SCORE: 24
ADLS_ACUITY_SCORE: 28
ADLS_ACUITY_SCORE: 25
ADLS_ACUITY_SCORE: 25
ADLS_ACUITY_SCORE: 22

## 2022-08-02 NOTE — PROGRESS NOTES
[facility]    Hepatology Service Progress Note    CC: Acute upper GI bleed    Dx: Acute upper GI bleed   Variceal hemorrhage   Decompensated EtOH-related cirrhosis   Acute blood loss anemia   EtOH use disorder    Assessment:    62 year old male with decompensated alcohol-related cirrhosis (ongoing ETOH use) admitted for variceal bleed s/p EGD with banding yesterday. No further bleeding, hemodynamically stable.      #. Decompensated EtOH-related cirrhosis MELD 25  #. Acute blood loss anemia  #. Esophageal variceal hemorrhage  #. Hemorrhagic shock - resolved  Improved significantly, hgb stable, no overt signs of GIB, weaned from pressors.    #. EtOH-use disorder  Declines support, AA, counseling, disulfiram today. Agreeable to trial gabapentin to curb cravings.     Recommendations:  -- trend hgb per primary  -- ADAT  -- trend MELD labs q 48 hours  -- continue octreotide for 72 total hours from presentation   -- 7 day course of antibiotics for SBP/translocation (ceftriaxone if IV, ciprofloxacin if PO)  -- once daily PPI 20 mg PO x 6 weeks for post-banding ulcers  -- for alcohol cravings, we recommend starting gabapentin 300 mg once daily, with up titration to at least 300 mg TID if tolerated, patient is agreeable  -- repeat EGD 6-8 weeks from now. We will order.  -- our service will arrange follow up closer to discharge, we will continue to follow    Patient discussed with hepatology staff Dr. Rivera Coffey MD, PGY5  Gastroenterology Fellow  [facility]  See [facility]/[place] for GI on-call information  ATTENDING NOTE HEPATOLOGY    I saw and discussed this patient with the fellow and participated in the decision making. I agree with the fellow's note. Shy Stafford MD    ====================================================================    24 hour events:    Weaned from pressors, no further overt GIB, mostly asymptomatic.     Subjective:    NAEO.  "Interested in a transfer to the floor. Extensive discussion about ongoing alcohol use and mortality related to his liver disease. Verbalized understanding. Open to trial of gabapentin.      Patient denies fevers, sweats or chills.    Medications  Current Facility-Administered Medications   Medication Dose Route Frequency     cefTRIAXone  1 g Intravenous Q24H     gabapentin  100 mg Oral TID     multivitamin w/minerals  1 tablet Oral Daily     pantoprazole (PROTONIX) IV  40 mg Intravenous QAM AC     tamoxifen  20 mg Oral Daily       Review of systems  A 10-point review of systems was negative.    Examination  /53   Pulse 81   Temp 98.4  F (36.9  C) (Oral)   Resp 28   Ht 1.727 m (5' 8\")   Wt 83.6 kg (184 lb 4.9 oz)   SpO2 92%   BMI 28.02 kg/m      Intake/Output Summary (Last 24 hours) at 8/2/2022 1044  Last data filed at 8/2/2022 0900  Gross per 24 hour   Intake 2905.45 ml   Output 2525 ml   Net 380.45 ml       Gen- well, NAD, A+Ox3, pale  CVS- RRR  RS- CTA  Abd- soft, barely distended, no rebound  Extr- trace LE edema  Neuro-A&Ox3, no asterixis  Skin- no rash  Psych- normal mood    Laboratory  Lab Results   Component Value Date    WBC 7.6 08/02/2022    HGB 8.1 (L) 08/02/2022    HCT 24.9 (L) 08/02/2022    PLT 57 (L) 08/02/2022     08/02/2022    POTASSIUM 4.9 08/02/2022    CHLORIDE 106 08/02/2022    CO2 25 08/02/2022    BUN 53.8 (H) 08/02/2022    CR 1.86 (H) 08/02/2022     (H) 08/02/2022     (H) 08/02/2022     (H) 08/02/2022    ALKPHOS 43 08/02/2022    BILITOTAL 2.2 (H) 08/02/2022    ROCK 23 07/31/2022    INR 2.09 (H) 08/01/2022     Radiology:  - personally reviewed    ATTENDING NOTE HEPATOLOGY    I saw and discussed this patient with the fellow and participated in the decision making. I agree with the fellow's note. Shy Stafford MD    "

## 2022-08-02 NOTE — PROGRESS NOTES
MEDICAL ICU PROGRESS NOTE  08/02/2022      Date of Service (when I saw the patient): 08/02/2022    ASSESSMENT: Gilles Malloy is a 62 year old male with PMH of decompensated ETOH cirrhosis c/b esophageal varices and ascites who was admitted on 7/31/2022 for severe hypotension resulting from hematemesis and melena 2/2 bleeding varices, admitted to the MICU for hypotension and concern for evolution into hemorrhagic shock, with contribution of hypovolemic shock, now s/p EGD with EV banding, off pressors, without signs of active bleeding.  Still with resolving MARZENA, but increased urine output after fluid resuscitation and resumption of diet.     Today  - Did well with diet yesterday - soft diet today  - Transfer to the floor    PLAN:     Neuro:  # Pain and sedation  - No pain on admission  - PTA norco PRN     #Alcohol withdrawal  Patient still active drinker - drinks two bottles of wine/week.  Last drink 7/30.  Has tremor on exam today, though less than admission, but no other signs of withdrawal.  No history of alcohol withdrawal, hospitalizations for withdrawal, or withdrawal seizures.  CIWA 3-5 overnight.  Per fiance, patient likely under-reports drinking.  Patient precontemplative; does not want ARS involved during this admission.  However, agreed to gabapentin to help reduce cravings (does not want any other pharmacologics at this time).  - CIWA protocol  - Gabapentin 100mg TID started  - Folate  - Thiamine    #Anxiety  - PTA xanax    # Asterixis, resolved  Noted on admission.  No other neurologic deficits.  Etiology most likely 2/2 HE, given decompensated cirrhosis and active drinking, though could also be confounded PE sign with active withdrawal and tremors.  Less likely uremic component (BUN 75 on admission).  Reassuringly, patient Ox4, without other neurological deficits.   Not seen on 8/2 exam.  - CTM     Pulmonology  - No active issues     Cardiovascular:  #Shock (resolved), likely  2/2  #Hypovolemia  #Hemorrhagic 2/2 upper GIB (portal hypertensive gastropathy, non-bleeding EV)  Presented to ED with 2 episodes of coffee-ground emesis.  EGD performed 8/1, which showed portal hypertensive gastropathy with contact oozing, as well as high risk EV without active bleeding, s/p 3 bands placed.   Also possible component of hypovolemia, given patient with decreased oral intake over last several days, and on furosemide and spironolactone for his ascites.  Has been off pressors since 8/1, since EGD.  - MAP goal 60mmHg  - Pulling RIJ    #Systolic murmur  [place] incidentally on exam.  No recent echocardiogram, and given shock picture on presentation, should further assess.  - Echocardiogram ordered - still pending     GI/Nutrition:  # Upper GIB, melena in the setting of:  # Decompensated EtOH cirrhosis c/b esophageal varices  Presented to ED with 2 episodes of coffee-ground emesis, concerning for upper GIB.  EGD performed 8/1, which showed portal hypertensive gastropathy with contact oozing, as well as high risk EV without active bleeding, s/p 3 bands placed.  - Hepatology consulted, appreciate recs:   - Soft diet today, as tolerated to regular diet tomorrow  - Protonix  Qday.  Patient needs 6 weeks of PPI  - Octreotide gtt - end date 8/4  - CTX 1g q24h - end date 8/7  - Hgb transfuse <7.  Daily CBC     #Decompensated ETOH cirrhosis c/b esophageal varices, ascites  No fluid wave seen on exam, though patient with history of ascites.  RUQ without large pocket of ascites.  - CTX, as above  -Hold PTA spironolactone, furosemide given hypovolemic on exam     Renal/Fluids/Electrolytes:  # Acute kidney failure  # Hypovolemic hyponatremia  Baseline Cr ~0.8, presented with creatinine of 3.61 - likely multifactorial, including GIB, hypovolemia (decreased oral intake), PTA diuretics; also unclear chronicity, given 1 year since seeing patient's creatinine baseline, though seems most likely acute.  Still self-reported  good urine output.  Patient received total of 4L crystaloids upon admission, though still appears hypovolemic.  No ramos placed on admission, despite ED order.  Initial UA with hyaline casts (showing hypovolemia); current UA with specific gravity 1.024, showing ability to concentrate, so unlikely ATN at this time.  Albumin given x 1.  Creatinine improving daily, with increased UOP.  - Nephrology consulted, appreciate recs  - CMP spaced to daily  - Bladder scans qshift  - Follow strict I/Os, daily weights     # Hyperkalemia - resolved  K+ 7 on admission. Initially with insulin, high dose albuterol, and lasix.  No peaked t waves seen.  Given no need to continue shifting, likely this is iatrogenic from spironolactione, in setting of hypovolemia.   - telemetry   - CMP spaced to qday     Endocrine:  -no active issues  - POCT glucose checks  - Hypoglycemia protocol     ID:  - No active issues     Hematology:    # Acute blood loss anemia  Hgb 5.7 on admission, s/p 2 units of pRBCs.  Now without active bleeding based on EGD, though did require 1U pRBC overnight.  - daily CBCs     # Malignant neoplasm of breast, s/p left mastectomy 2019  - PTA tamoxifen resumed     Musculoskeletal:  No active issues  - PT/OT consulted - activity with assistance to start, then as tolerated     Skin:  No active issues    General Cares/Prophylaxis:    DVT Prophylaxis: Pneumatic Compression Devices  GI Prophylaxis: PPI  Restraints: n/a  Family Communication: Fiance called; left generic message  Code Status: FULL    Lines/tubes/drains:  - RIJ --> to be removed, order placed  - 3 PIV    Disposition:  - Medical ICU     Patient seen and findings/plan discussed with medical ICU staff, Dr. Rinaldi.    OSCAR Nobles MD  Internal Medicine Resident  [facility]    ====================================  INTERVAL HISTORY:   EGD yesterday with banding; no active bleeding seen.  Received 1U pRBCs overnight.  Off levophed yesterday afternoon.   Tolerated clear liquid diet, no BMs yesterday.  No n/v, abdominal pain.  Denies chest pain, SOB, leg swelling.  Wants to ambulate more today.    OBJECTIVE:   1. VITAL SIGNS:   Temp:  [98.2  F (36.8  C)-98.7  F (37.1  C)] 98.4  F (36.9  C)  Pulse:  [70-98] 71  Resp:  [8-33] 10  BP: ()/(53-82) 92/57  SpO2:  [86 %-99 %] 92 %  Resp: 10    2. INTAKE/ OUTPUT:   I/O last 3 completed shifts:  In: 2952.7 [P.O.:800; I.V.:652.7; IV Piggyback:500]  Out: 2500 [Urine:2500]    3. PHYSICAL EXAMINATION:  Constitutional: NAD, pleasant, cooperative.  Tremors seen on yesterday's exam almost resolved.  Still with more rapid speech  HEENT: Sclera anicteric, Normal oropharynx without ulcers or exudate, MMM  CV: RRR, no murmurs, gallops or rubs. JVD normal   Respiratory: CTAB, no wheezing, crackles or rales. Non-labored  Abd: Soft, mild distension without fluid wave, +bs, no HSM  Ext: No edema.  Good peripheral pulses  Skin: No lesions or rashes over exposed areas, normal color, texture and turgor  Neuro: AAO x 3.  Mild tremor as above.  No asterixis    4. LABS:   Arterial Blood Gases   No lab results found in last 7 days.  Complete Blood Count   Recent Labs   Lab 08/02/22 0441 08/01/22 2329 08/01/22 2018 08/01/22  1228 08/01/22  0341   WBC 7.6  --  7.1 10.5 11.5*   HGB 8.1* 6.9* 7.4* 8.9* 8.4*   PLT 57*  --  57* 80* 76*     Basic Metabolic Panel  Recent Labs   Lab 08/02/22  0802 08/02/22 0446 08/02/22  0441 08/01/22 2024 08/01/22 2018 08/01/22  1706 08/01/22  1228 08/01/22  0812 08/01/22  0341   NA  --   --  136  --  136  --  134*  --  135*   POTASSIUM  --   --  4.9  --  4.8  --  5.7*  --  5.1   CHLORIDE  --   --  106  --  105  --  104  --  102   CO2  --   --  25  --  23  --  22  --  23   BUN  --   --  53.8*  --  60.7*  --  71.6*  --  71.1*   CR  --   --  1.86*  --  2.17*  --  2.79*  --  3.21*   * 121* 124* 136* 140*   < > 149*   < > 109*    < > = values in this interval not displayed.     Liver Function Tests  Recent  Labs   Lab 08/02/22  0441 08/01/22  2018 08/01/22  1228 08/01/22  0341 07/31/22  1726 07/31/22  1354   * 181* 211*  --  130*  --    * 101* 116*  --  69*  --    ALKPHOS 43 42 55  --  44  --    BILITOTAL 2.2* 1.9* 2.5*  --  1.5*  --    ALBUMIN 3.2* 3.5 2.4*  --  2.2*  --    INR  --   --   --  2.09*  --  2.31*     Coagulation Profile  Recent Labs   Lab 08/01/22  0341 07/31/22  1354   INR 2.09* 2.31*   PTT  --  44*       5. RADIOLOGY:   No results found for this or any previous visit (from the past 24 hour(s)).

## 2022-08-02 NOTE — PROGRESS NOTES
[facility]  Consult Note - NEPHROLOGY SERVICE   Date of Admission:  7/31/2022  Consult Requested by:Butch Nobles MD     Assessment & Plan   Gilles Malloy is a 62 year old male admitted on 7/31/2022. He has a past medical history notable for alcohol use disorder complicated by alcoholic cirrhosis with known esophageal varices and history of ascites left sided breast cancer status post mastectomy who presented via ambulance with persistent hematemesis and dizziness found to have acute blood loss anemia, hyperkalemia, anion gap metabolic acidosis and acute  kidney injury now status post crystalloid and colloid resuscitation, bicarb, potassium shifting, along with 4 total units of packed RBCs and EGD w/ variceal banding on 8/1/22,  currently off pressor support in medical ICU.     Nephrology was consulted for management of acute kidney failure and question of whether or not hemodialysis may be warranted from such.    #Non-oliguric Acute kidney injury (improving) likely 2/2 to prerenal to ischemic ATN in setting of hemorrhagic shock.   #Hyperkalemia (improving)  #Anion gap metabolic acidosis (improved)  #Lactic acidosis 2/2 hypoperfusion from hemorrhagic shock (resolved)  #Acute blood loss anemia secondary to upper GI bleed and a known cirrhotic with varices s/p banding and pRBCs(improved)  # Thrombocytopenia, chronic   #Hepatocellular pattern of elevation in liver enzymes  #Elevated INR          Patient in setting of hemorrhagic shock likely suffered hypoperfusion to the kidney and while U/A sediment doesn't show overt evidence of ATN, I suspect likely a component of ischemic ATN contributing to his MARZENA. Regardless, given ongoing adequate urine output, and appropriate resuscitation along with improvement in Creatinine (down to 1.86 from 3.2 yesterday), there is currently not indication for renal replacement therapy in this patient and will likely benefit from  "intravascular repletion and bleeding source control as ongoing.       - Agree with transfusion threshold >7 as ongoing.     - please consider starting maintenance fluids at 40-75cc/hr given ongoing softer Blood pressures and minimal intake.    -  May consider albumin as needed should BP stay low.     - If ongoing rise in K+ give Sodium-Zirconim cyclosilicate(Lokelma) 10 TID for 48 hours.     - trend BMP and cbc ; recheck  K+ after 4 hours of interventions.     - Maintain MAPS >65 to ensure adequate perfusion to the kidneys.    - avoid nephrotoxins.     - track I/Os, with particular attention to the urine output.    - Agree w/ primary team for management of other issues.     Nephrology will follow along with you for today, if he continues to improve as currently, will likely sign of tomorrow.  Thanks for letting us be part of Mr. Malloy's care.      The patient's care was discussed with the patient, bedside nurse and attending physician Dr. Abisai Brantley MD   PGY2   Internal Medicine   Nephrology [facility]  Securely message with the Vocera Web Console (learn more here)  Text page via [facility] Paging/Directory       Hospitalist Service, GOLD TEAM 9    Clinically Significant Risk Factors Present on Admission                # Overweight: Estimated body mass index is 28.02 kg/m  as calculated from the following:    Height as of this encounter: 1.727 m (5' 8\").    Weight as of this encounter: 83.6 kg (184 lb 4.9 oz).        ______________________________________________________________________    INTERVAL HISTORY   Patient seen in room, off pressors, got RBC 1 unit overnight for Hgb 6.9   Reports tolerating liquid diet well, hoping he can eat some more.   Denies fevers, chills, chest pain,shortness of breath, abdominal pain or difficulty urinating.     Review of Systems   Unless as indicated, all systems reviewed were negative.     Past Medical History  "   I have reviewed this patient's medical history and updated it with pertinent information if needed.   Past Medical History:   Diagnosis Date     Alcoholic liver disease (H) 2010    Pt has cut down on alcoholic intake and has not needed a paracentesis since 2016 or 2017     Alcoholism (H)      Breast cancer (H) 11/2018    Lt sided       Medications   Current Facility-Administered Medications   Medication     ALPRAZolam (XANAX) tablet 0.5 mg     cefTRIAXone (ROCEPHIN) 1 g vial to attach to  mL bag for ADULTS or NS 50 mL bag for PEDS     glucose gel 15-30 g    Or     dextrose 50 % injection 25-50 mL    Or     glucagon injection 1 mg     flumazenil (ROMAZICON) injection 0.2 mg     gabapentin (NEURONTIN) capsule 100 mg     HYDROcodone-acetaminophen (NORCO)  MG per tablet 1 tablet     LORazepam (ATIVAN) tablet 1-2 mg    Or     LORazepam (ATIVAN) injection 1-2 mg     multivitamin w/minerals (THERA-VIT-M) tablet 1 tablet     naloxone (NARCAN) injection 0.2 mg    Or     naloxone (NARCAN) injection 0.4 mg    Or     naloxone (NARCAN) injection 0.2 mg    Or     naloxone (NARCAN) injection 0.4 mg     octreotide (sandoSTATIN) 1,250 mcg in D5W 250 mL     ondansetron (ZOFRAN) injection 4 mg     pantoprazole (PROTONIX) IV push injection 40 mg     tamoxifen (NOLVADEX) tablet 20 mg       Allergies   Allergies   Allergen Reactions     Penicillins      Sulfa Drugs        Physical Exam   Vital Signs: Temp: 98.4  F (36.9  C) Temp src: Oral BP: 110/53 Pulse: 81   Resp: 28 SpO2: 92 % O2 Device: None (Room air) Oxygen Delivery: 2 LPM  Weight: 184 lbs 4.87 oz     General: alert and oriented x3, in no apparent cardiopulmonary distress   Neck: RIJ line in place.   HEENT: normocephalic, atraumatic, near moist mucous membranes PERRL, EOMI, no scleral icterus or chemosis,conjunctival pallor   Neurologic: 5/5 strength in all 4 extremities, deferred gait, sensation grossly intact   Cardiac: Regular rate and rhythm, normal S1, S2, no  murmurs gallops or rubs    Pulmonary: anterior and RML clear to auscultation with occasional upper airway sounds resonating throughout.  Abdomen: soft, non-distended, no irregular masses,no organomegaly, no rebound or guarding.   Genitourinary: No urinary catheter present   Extremities: No lower extremity edema, palpable dorsalis pedis and posterior tibialis pulses.   Musculoskeletal: No joint swelling, full ROM in all large joints   Psychiatric: Linear thought processes, normal speech, mood appropriate affect.           Data   Results for orders placed or performed during the hospital encounter of 07/31/22 (from the past 24 hour(s))   CBC with platelets   Result Value Ref Range    WBC Count 10.5 4.0 - 11.0 10e3/uL    RBC Count 2.70 (L) 4.40 - 5.90 10e6/uL    Hemoglobin 8.9 (L) 13.3 - 17.7 g/dL    Hematocrit 25.8 (L) 40.0 - 53.0 %    MCV 96 78 - 100 fL    MCH 33.0 26.5 - 33.0 pg    MCHC 34.5 31.5 - 36.5 g/dL    RDW 16.7 (H) 10.0 - 15.0 %    Platelet Count 80 (L) 150 - 450 10e3/uL   Comprehensive metabolic panel   Result Value Ref Range    Sodium 134 (L) 136 - 145 mmol/L    Potassium 5.7 (H) 3.4 - 5.3 mmol/L    Creatinine 2.79 (H) 0.67 - 1.17 mg/dL    Urea Nitrogen 71.6 (H) 8.0 - 23.0 mg/dL    Chloride 104 98 - 107 mmol/L    Carbon Dioxide (CO2) 22 22 - 29 mmol/L    Anion Gap 8 7 - 15 mmol/L    Glucose 149 (H) 70 - 99 mg/dL    Calcium 7.9 (L) 8.8 - 10.2 mg/dL    Protein Total 5.1 (L) 6.4 - 8.3 g/dL    Albumin 2.4 (L) 3.5 - 5.2 g/dL    Bilirubin Total 2.5 (H) <=1.2 mg/dL    Alkaline Phosphatase 55 40 - 129 U/L     (H) 10 - 50 U/L     (H) 10 - 50 U/L    GFR Estimate 25 (L) >60 mL/min/1.73m2   Glucose by meter   Result Value Ref Range    GLUCOSE BY METER POCT 142 (H) 70 - 99 mg/dL   Glucose by meter   Result Value Ref Range    GLUCOSE BY METER POCT 118 (H) 70 - 99 mg/dL   CBC with platelets   Result Value Ref Range    WBC Count 7.1 4.0 - 11.0 10e3/uL    RBC Count 2.31 (L) 4.40 - 5.90 10e6/uL    Hemoglobin  7.4 (L) 13.3 - 17.7 g/dL    Hematocrit 22.1 (L) 40.0 - 53.0 %    MCV 96 78 - 100 fL    MCH 32.0 26.5 - 33.0 pg    MCHC 33.5 31.5 - 36.5 g/dL    RDW 16.6 (H) 10.0 - 15.0 %    Platelet Count 57 (L) 150 - 450 10e3/uL   Comprehensive metabolic panel   Result Value Ref Range    Sodium 136 136 - 145 mmol/L    Potassium 4.8 3.4 - 5.3 mmol/L    Creatinine 2.17 (H) 0.67 - 1.17 mg/dL    Urea Nitrogen 60.7 (H) 8.0 - 23.0 mg/dL    Chloride 105 98 - 107 mmol/L    Carbon Dioxide (CO2) 23 22 - 29 mmol/L    Anion Gap 8 7 - 15 mmol/L    Glucose 140 (H) 70 - 99 mg/dL    Calcium 8.2 (L) 8.8 - 10.2 mg/dL    Protein Total 5.5 (L) 6.4 - 8.3 g/dL    Albumin 3.5 3.5 - 5.2 g/dL    Bilirubin Total 1.9 (H) <=1.2 mg/dL    Alkaline Phosphatase 42 40 - 129 U/L     (H) 10 - 50 U/L     (H) 10 - 50 U/L    GFR Estimate 33 (L) >60 mL/min/1.73m2   Glucose by meter   Result Value Ref Range    GLUCOSE BY METER POCT 136 (H) 70 - 99 mg/dL   Hemoglobin   Result Value Ref Range    Hemoglobin 6.9 (LL) 13.3 - 17.7 g/dL   Prepare red blood cells (unit)   Result Value Ref Range    CROSSMATCH Compatible     UNIT ABO/RH O Pos     Unit Number [id number]     Unit Status Issued     Blood Component Type Red Blood Cells     Product Code E0049H03     CODING SYSTEM [id number]     UNIT TYPE ISBT 5100     ISSUE DATE AND TIME [id number]    CBC with platelets   Result Value Ref Range    WBC Count 7.6 4.0 - 11.0 10e3/uL    RBC Count 2.59 (L) 4.40 - 5.90 10e6/uL    Hemoglobin 8.1 (L) 13.3 - 17.7 g/dL    Hematocrit 24.9 (L) 40.0 - 53.0 %    MCV 96 78 - 100 fL    MCH 31.3 26.5 - 33.0 pg    MCHC 32.5 31.5 - 36.5 g/dL    RDW 16.9 (H) 10.0 - 15.0 %    Platelet Count 57 (L) 150 - 450 10e3/uL   Comprehensive metabolic panel   Result Value Ref Range    Sodium 136 136 - 145 mmol/L    Potassium 4.9 3.4 - 5.3 mmol/L    Creatinine 1.86 (H) 0.67 - 1.17 mg/dL    Urea Nitrogen 53.8 (H) 8.0 - 23.0 mg/dL    Chloride 106 98 - 107 mmol/L    Carbon Dioxide (CO2) 25 22 - 29  mmol/L    Anion Gap 5 (L) 7 - 15 mmol/L    Glucose 124 (H) 70 - 99 mg/dL    Calcium 8.1 (L) 8.8 - 10.2 mg/dL    Protein Total 5.3 (L) 6.4 - 8.3 g/dL    Albumin 3.2 (L) 3.5 - 5.2 g/dL    Bilirubin Total 2.2 (H) <=1.2 mg/dL    Alkaline Phosphatase 43 40 - 129 U/L     (H) 10 - 50 U/L     (H) 10 - 50 U/L    GFR Estimate 40 (L) >60 mL/min/1.73m2   Glucose by meter   Result Value Ref Range    GLUCOSE BY METER POCT 121 (H) 70 - 99 mg/dL   Glucose by meter   Result Value Ref Range    GLUCOSE BY METER POCT 148 (H) 70 - 99 mg/dL

## 2022-08-02 NOTE — PLAN OF CARE
Physical Therapy: Orders received. Chart reviewed and discussed with care team.? Physical Therapy not indicated due to pt mobilizing SBA upon OT eval/intervention, pt demonstrates no current IP PT needs.? Defer discharge recommendations to OT.? PT will complete orders.

## 2022-08-02 NOTE — PROGRESS NOTES
"The patient was seen and examined by me with and independent of  and housestaff team.  The case was discussed at length. Pertinent vitals, lab results and imaging were reviewed by me. Agree with the resident's note from today as reflects our joint assessment and plan.     62 M etoh cirrhosis a/w hemodynamically significant GI Bleed found to have gastropathy and varices (non bleeding) but banded. Hemodynamics stable and advancing diet.  Per \"fiance\" - pt under reports his drinking, thus need to watch for withdrawal, currently scoring low on CIWA and restarted home xanax which can hopefully mitigate withdrawal symptoms. Ok to transfer to floor     Terry Rinaldi MD   L3  "

## 2022-08-02 NOTE — PROGRESS NOTES
[facility]    Medicine Progress Note - Hospitalist Service, GOLD TEAM 9    Date of Admission:  7/31/2022    Assessment & Plan          Gilles Malloy is a 62 year old male with a past medical history of decompensated ETOH cirrhosis c/b esophageal varices and ascites who was admitted to the MICU on 7/31/2022 for severe hypotension resulting from hematemesis and melena secondary to bleeding varices. He was placed on a Levophed drip, GI was consulted and per recommendations, he underwent banding, received 3U of PRNC and fluid resuscitation. Hemoglobin improved from 6.9 to 8.1 and remains stable with no further signs of bleeding. His levophed drip was stopped yesterday. He remains on an Octreotide drip, antibiotics, PPI and will be transferring out of the MICU to the medicine floor this evening.     # Hemorrhagic shock secondary to bleeding varices, s/p banding, resolved  # Alcohol use disorder  -GI consulted and per recommendations, he underwent banding and is doing much better now. We will continue to trend hemoglobin twice daily  -Continue Octreotide drip for 72hr (through tomorrow)  -Continue Ceftriaxone for 7 days (can transition to Ciprofloxacin if discharging), last dose August 6th, 2022  -Continue Gabapentin 300mg once daily with slow titration up to TID for alcohol cravings  -trend MELD labs q48h  -Continue PPI 20mg once daily x 6 weeks  -Mr. Malloy is not interested in chemical dependency help at this time  -Continue with regular diet. He has been eating well and passed a bowel movement this afternoon, nonbloody    # Acute anemia secondary to GI bleed, improving  -Hemoglobin dropped to 6.9g/dL after admission and received 4U PRBC has now improved to 8.1g/dL    # Acute on chronic renal failure secondary to hemorrhagic shock, improving  -Cr improved to 1.86 from 2.79 with no significant electrolyte abnormalities       Diet: Soft & Bite Sized Diet (level 6)  "Liquidized/Moderately Thick (level 3)    DVT Prophylaxis:   Peraza Catheter: Not present  Central Lines: PRESENT  CVC TRIPLE LUMEN Right Internal jugular-Site Assessment: WDL  Cardiac Monitoring: ACTIVE order. Indication: ICU  Code Status: Full Code      Disposition Plan     Expected Discharge Date: 08/06/2022                    CHARANJIT Oro  Hospitalist Service, GOLD TEAM 9  [facility]  Securely message with the Vocera Web Console (learn more here)  Text page via [facility] Paging/Directory   Please see signed in provider for up to date coverage information      Clinically Significant Risk Factors Present on Admission               # Overweight: Estimated body mass index is 28.02 kg/m  as calculated from the following:    Height as of this encounter: 1.727 m (5' 8\").    Weight as of this encounter: 83.6 kg (184 lb 4.9 oz).        ______________________________________________________________________    Interval History   Mr. Malloy was sitting up in a chair, no acute distress, denies pain, shortness of breath, abdominal pain, nausea, vomiting or additional new symptoms.     Data reviewed today: I reviewed all medications, new labs and imaging results over the last 24 hours.     Physical Exam   Vital Signs: Temp: 98.4  F (36.9  C) Temp src: Oral BP: 110/53 Pulse: 81   Resp: 28 SpO2: 92 % O2 Device: None (Room air) Oxygen Delivery: 2 LPM  Weight: 184 lbs 4.87 oz  General Appearance: 62 year old gentleman sitting up in a chair, no acute distress, denies pain  Respiratory: breathing comfortably on 2L nasal canula, 96%, no adventitious sounds to bilateral auscultation  Cardiovascular: regular rate and rhythm, no appreciable murmurs, rubs or gallops  GI: rotund abdomen, tinkering bowel sounds present, soft, non-tender to palpation throughout, no rebound or guarding  Skin: warm, dry, no open sores, lesions or ulcerations  Psych: alert, oriented to name, hospital and can " tell me details from yesterday and this morning, slightly anxious, denies depressed mood    Data   Recent Labs   Lab 08/02/22  0802 08/02/22  0446 08/02/22  0441 08/01/22 2329 08/01/22 2024 08/01/22 2018 08/01/22  1706 08/01/22  1228 08/01/22  0812 08/01/22  0341 07/31/22  1631 07/31/22  1354 07/31/22  1349   WBC  --   --  7.6  --   --  7.1  --  10.5  --  11.5*   < >  --  12.0*   HGB  --   --  8.1* 6.9*  --  7.4*  --  8.9*  --  8.4*   < >  --  5.7*   MCV  --   --  96  --   --  96  --  96  --  94   < >  --  107*   PLT  --   --  57*  --   --  57*  --  80*  --  76*   < >  --  81*   INR  --   --   --   --   --   --   --   --   --  2.09*  --  2.31*  --    NA  --   --  136  --   --  136  --  134*  --  135*   < >  --  132*   POTASSIUM  --   --  4.9  --   --  4.8  --  5.7*  --  5.1   < >  --  7.0*  7.0*   CHLORIDE  --   --  106  --   --  105  --  104  --  102   < >  --  98   CO2  --   --  25  --   --  23  --  22  --  23   < >  --  18*   BUN  --   --  53.8*  --   --  60.7*  --  71.6*  --  71.1*   < >  --  73.3*   CR  --   --  1.86*  --   --  2.17*  --  2.79*  --  3.21*   < >  --  3.61*   ANIONGAP  --   --  5*  --   --  8  --  8  --  10   < >  --  16*   LUCIA  --   --  8.1*  --   --  8.2*  --  7.9*  --  7.9*   < >  --  8.7*   * 121* 124*  --    < > 140*   < > 149*   < > 109*   < >  --  86   ALBUMIN  --   --  3.2*  --   --  3.5  --  2.4*  --   --    < >  --  2.5*   PROTTOTAL  --   --  5.3*  --   --  5.5*  --  5.1*  --   --    < >  --  5.1*   BILITOTAL  --   --  2.2*  --   --  1.9*  --  2.5*  --   --    < >  --  1.9*   ALKPHOS  --   --  43  --   --  42  --  55  --   --    < >  --  52   ALT  --   --  116*  --   --  101*  --  116*  --   --    < >  --  71*   AST  --   --  200*  --   --  181*  --  211*  --   --    < >  --  129*   LIPASE  --   --   --   --   --   --   --   --   --   --   --   --  59    < > = values in this interval not displayed.

## 2022-08-02 NOTE — PROGRESS NOTES
"   08/02/22 1053   Quick Adds   Type of Visit Initial Occupational Therapy Evaluation   Living Environment   People in Home other (see comments)  (roommates)   Current Living Arrangements house;other (see comments)  (Duplex)   Home Accessibility stairs to enter home   Number of Stairs, Main Entrance 6   Transportation Anticipated car, drives self   Living Environment Comments Patient lives in a duplex (upper level) with roommates. 6 stairs to access. All needs met on one level.   Self-Care   Usual Activity Tolerance good   Current Activity Tolerance moderate   Regular Exercise Yes   Activity/Exercise Type strength training  (Golfing)   Exercise Amount/Frequency 3-5 times/wk  (strengh training)   Equipment Currently Used at Home none   Fall history within last six months yes   Number of times patient has fallen within last six months 1  (Reports d/t dizziness)   Activity/Exercise/Self-Care Comment Patient reports independence with ADL/IADL tasks at baseline. Golfs; completes strength training for UE's a few times a week though hasn't right befoer admission. No equipment use.   Instrumental Activities of Daily Living (IADL)   IADL Comments Patient reports independence with all IADL tasks at baseline. 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   General Information   Onset of Illness/Injury or Date of Surgery 07/31/22   Referring Physician Butch Nobles MD   Patient/Family Therapy Goal Statement (OT) Eager to get out of bed. Hoping to transfer out of the ICU.   Additional Occupational Profile Info/Pertinent History of Current Problem Per chart: \"Gilles Malloy is a 62 year old male admitted on 7/31/2022. He has a past medical history notable for alcohol use disorder complicated by alcoholic cirrhosis with known esophageal varices and history of ascites left sided breast cancer status post mastectomy who presented via ambulance with persistent hematemesis and dizziness found to have " "acute blood loss anemia, hyperkalemia, anion gap metabolic acidosis and acute  kidney injury now status post crystalloid and colloid resuscitation, bicarb, potassium shifting, along with 4 total units of packed RBCs and EGD w/ variceal banding on 8/1/22,  currently off pressor support in medical ICU.\"   Existing Precautions/Restrictions fall   General Observations and Info Activity orders: Ambulate with assist.   Cognitive Status Examination   Orientation Status orientation to person, place and time   Cognitive Status Comments Cognition appears grossly intact. Reports to feel at baseline.   Visual Perception   Impact of Vision Impairment on Function (Vision) Denies acute changes.   Sensory   Sensory Comments Denies numbness/tingling.   Pain Assessment   Patient Currently in Pain No   Integumentary/Edema   Integumentary/Edema Comments None observed.   Range of Motion Comprehensive   Comment, General Range of Motion UE's appear WFL   Strength Comprehensive (MMT)   Comment, General Manual Muscle Testing (MMT) Assessment Not formally tested; appears WFL for ADLs   Bed Mobility   Comment (Bed Mobility) SBA   Transfers   Transfer Comments SBA   Balance   Balance Comments SBA for marching in place x5 reps   Bathing Assessment/Intervention   Comment, (Bathing) Anticipate SBA per clinical judgment.   Lower Body Dressing Assessment/Training   Comment, (Lower Body Dressing) SBA when standing per clinical judgment.   Grooming Assessment/Training   Comment, (Grooming) IND   Toileting   Comment, (Toileting) SBA per clinical judgment   Clinical Impression   Criteria for Skilled Therapeutic Interventions Met (OT) Yes, treatment indicated   OT Diagnosis Decreased activity tolerance for full return to ADL/IADL.   Influenced by the following impairments Medical Status (Lower BPs + Hgb)   OT Problem List-Impairments impacting ADL activity tolerance impaired;problems related to   Assessment of Occupational Performance 1-3 Performance " Deficits   Identified Performance Deficits Tolerance for return to full ADL/IADL routine   Planned Therapy Interventions (OT) progressive activity/exercise;risk factor education;home program guidelines   Clinical Decision Making Complexity (OT) low complexity   Anticipated Equipment Needs Upon Discharge (OT) other (see comments)  (TBD)   Risk & Benefits of therapy have been explained risks/benefits reviewed;evaluation/treatment results reviewed;care plan/treatment goals reviewed;current/potential barriers reviewed;participants voiced agreement with care plan;participants included;patient   Clinical Impression Comments Patient presenting slightly below baseline ADL/IADL activity tolerance levels secondary to medical status, but likely just below baseline. Will benefit from skilled OT for a few sessions to ensure safe progression of activity with monitoring of vitals and education on return to ADL/IADL.   OT Discharge Planning   OT Discharge Recommendation (DC Rec) home with assist   OT Rationale for DC Rec Anticipate patient will be safe to return home with assist for heavy ADL/IADL tasks as needed.   OT Brief overview of current status SBA   Total Evaluation Time (Minutes)   Total Evaluation Time (Minutes) 5   OT Goals   Therapy Frequency (OT) Other (see comments)   OT Predicted Duration/Target Date for Goal Attainment 08/05/22  (Eval and 2-3 sessions)   OT: Goal 1 Patient will demonstrate independent functional mobility >200 feet for return to community ADL/IADL.   OT: Goal 2 Patient will demonstrate negotiation of 6 stairs independently and in good balance to facilitate safe return home.

## 2022-08-02 NOTE — PLAN OF CARE
"Goal Outcome Evaluation:    ICU End of Shift Summary. See flowsheets for vital signs and detailed assessment.    Changes this shift: CIWA continues, max score=5 (mild tremors). PRN Xanax (home dose) available. PRN Vicodin x1 (denies pain, states he \"takes it at home\"). Hemodynamically stable. Central line removed. Voiding via urinal. Bowel movement x1 (black in color). Tolerating soft diet. Up to chair with SBA,     Plan:  Transfer to /S [facility] when bed available.      Problem: Behavioral Health Comorbidity  Goal: Maintenance of Behavioral Health Symptom Control  Outcome: Ongoing, Progressing       "

## 2022-08-02 NOTE — PLAN OF CARE
ICU End of Shift Summary. See flowsheets for vital signs and detailed assessment.    Changes this shift: VSS. SR 70's. 2L NC NOC. A&O x4. CIWA scores low. No meds required. See flowsheet. No BM this shift. 1U RBC given for hgb of 6.9. Recheck 8.1. Blood glucose stable. Using urinal per self. Bladder scan post void showed 104mL in bladder.     Plan: Trend hgb. Monitor for melena. CIWA assessments per protocol.       Goal Outcome Evaluation:    Plan of Care Reviewed With: patient     Overall Patient Progress: improving    Outcome Evaluation: VSS. SR 70's. 2L NC NOC. 1U RBC fo hgb of 6.9. No stools NOC .

## 2022-08-03 VITALS
DIASTOLIC BLOOD PRESSURE: 59 MMHG | RESPIRATION RATE: 10 BRPM | BODY MASS INDEX: 27.22 KG/M2 | HEART RATE: 68 BPM | TEMPERATURE: 98 F | HEIGHT: 68 IN | WEIGHT: 179.6 LBS | SYSTOLIC BLOOD PRESSURE: 105 MMHG | OXYGEN SATURATION: 95 %

## 2022-08-03 LAB
ALBUMIN SERPL BCG-MCNC: 2.8 G/DL (ref 3.5–5.2)
ALP SERPL-CCNC: 47 U/L (ref 40–129)
ALT SERPL W P-5'-P-CCNC: 127 U/L (ref 10–50)
ANION GAP SERPL CALCULATED.3IONS-SCNC: 7 MMOL/L (ref 7–15)
AST SERPL W P-5'-P-CCNC: 177 U/L (ref 10–50)
BASE EXCESS BLDV CALC-SCNC: -1.8 MMOL/L (ref -7.7–1.9)
BILIRUB SERPL-MCNC: 1.8 MG/DL
BUN SERPL-MCNC: 31.5 MG/DL (ref 8–23)
CALCIUM SERPL-MCNC: 7.9 MG/DL (ref 8.8–10.2)
CHLORIDE SERPL-SCNC: 107 MMOL/L (ref 98–107)
CREAT SERPL-MCNC: 1.27 MG/DL (ref 0.67–1.17)
DEPRECATED HCO3 PLAS-SCNC: 20 MMOL/L (ref 22–29)
ERYTHROCYTE [DISTWIDTH] IN BLOOD BY AUTOMATED COUNT: 18.3 % (ref 10–15)
GFR SERPL CREATININE-BSD FRML MDRD: 63 ML/MIN/1.73M2
GLUCOSE SERPL-MCNC: 118 MG/DL (ref 70–99)
HCO3 BLDV-SCNC: 24 MMOL/L (ref 21–28)
HCT VFR BLD AUTO: 29.3 % (ref 40–53)
HGB BLD-MCNC: 9.3 G/DL (ref 13.3–17.7)
HOLD SPECIMEN: NORMAL
LACTATE SERPL-SCNC: 2 MMOL/L (ref 0.7–2)
MCH RBC QN AUTO: 32.1 PG (ref 26.5–33)
MCHC RBC AUTO-ENTMCNC: 31.7 G/DL (ref 31.5–36.5)
MCV RBC AUTO: 101 FL (ref 78–100)
O2/TOTAL GAS SETTING VFR VENT: 21 %
PCO2 BLDV: 45 MM HG (ref 40–50)
PH BLDV: 7.34 [PH] (ref 7.32–7.43)
PLATELET # BLD AUTO: 54 10E3/UL (ref 150–450)
PO2 BLDV: 35 MM HG (ref 25–47)
POTASSIUM SERPL-SCNC: 5 MMOL/L (ref 3.4–5.3)
PROT SERPL-MCNC: 5 G/DL (ref 6.4–8.3)
RBC # BLD AUTO: 2.9 10E6/UL (ref 4.4–5.9)
SODIUM SERPL-SCNC: 134 MMOL/L (ref 136–145)
WBC # BLD AUTO: 7 10E3/UL (ref 4–11)

## 2022-08-03 PROCEDURE — 82803 BLOOD GASES ANY COMBINATION: CPT | Performed by: STUDENT IN AN ORGANIZED HEALTH CARE EDUCATION/TRAINING PROGRAM

## 2022-08-03 PROCEDURE — 85027 COMPLETE CBC AUTOMATED: CPT

## 2022-08-03 PROCEDURE — 80053 COMPREHEN METABOLIC PANEL: CPT

## 2022-08-03 PROCEDURE — 250N000013 HC RX MED GY IP 250 OP 250 PS 637

## 2022-08-03 PROCEDURE — 83605 ASSAY OF LACTIC ACID: CPT | Performed by: STUDENT IN AN ORGANIZED HEALTH CARE EDUCATION/TRAINING PROGRAM

## 2022-08-03 PROCEDURE — 99239 HOSP IP/OBS DSCHRG MGMT >30: CPT | Performed by: HOSPITALIST

## 2022-08-03 PROCEDURE — 250N000013 HC RX MED GY IP 250 OP 250 PS 637: Performed by: HOSPITALIST

## 2022-08-03 PROCEDURE — 250N000013 HC RX MED GY IP 250 OP 250 PS 637: Performed by: STUDENT IN AN ORGANIZED HEALTH CARE EDUCATION/TRAINING PROGRAM

## 2022-08-03 PROCEDURE — 36415 COLL VENOUS BLD VENIPUNCTURE: CPT | Performed by: STUDENT IN AN ORGANIZED HEALTH CARE EDUCATION/TRAINING PROGRAM

## 2022-08-03 PROCEDURE — 99232 SBSQ HOSP IP/OBS MODERATE 35: CPT | Mod: GC | Performed by: INTERNAL MEDICINE

## 2022-08-03 PROCEDURE — 250N000013 HC RX MED GY IP 250 OP 250 PS 637: Performed by: INTERNAL MEDICINE

## 2022-08-03 PROCEDURE — 36415 COLL VENOUS BLD VENIPUNCTURE: CPT

## 2022-08-03 RX ORDER — PANTOPRAZOLE SODIUM 20 MG/1
20 TABLET, DELAYED RELEASE ORAL
Status: DISCONTINUED | OUTPATIENT
Start: 2022-08-04 | End: 2022-08-03 | Stop reason: HOSPADM

## 2022-08-03 RX ORDER — GABAPENTIN 100 MG/1
200 CAPSULE ORAL 3 TIMES DAILY
Status: DISCONTINUED | OUTPATIENT
Start: 2022-08-03 | End: 2022-08-03 | Stop reason: HOSPADM

## 2022-08-03 RX ORDER — SPIRONOLACTONE 50 MG/1
50 TABLET, FILM COATED ORAL 2 TIMES DAILY
COMMUNITY
Start: 2022-08-03

## 2022-08-03 RX ORDER — FUROSEMIDE 40 MG
40 TABLET ORAL DAILY
COMMUNITY
Start: 2022-08-03

## 2022-08-03 RX ORDER — CIPROFLOXACIN 500 MG/1
500 TABLET, FILM COATED ORAL
Status: DISCONTINUED | OUTPATIENT
Start: 2022-08-03 | End: 2022-08-03 | Stop reason: HOSPADM

## 2022-08-03 RX ORDER — CIPROFLOXACIN 500 MG/1
500 TABLET, FILM COATED ORAL EVERY 24 HOURS
Qty: 3 TABLET | Refills: 0 | Status: SHIPPED | OUTPATIENT
Start: 2022-08-04 | End: 2022-08-07

## 2022-08-03 RX ORDER — PANTOPRAZOLE SODIUM 20 MG/1
20 TABLET, DELAYED RELEASE ORAL
Qty: 90 TABLET | Refills: 0 | Status: SHIPPED | OUTPATIENT
Start: 2022-08-04 | End: 2022-11-02

## 2022-08-03 RX ORDER — GABAPENTIN 100 MG/1
200 CAPSULE ORAL 3 TIMES DAILY
Qty: 180 CAPSULE | Refills: 3 | Status: SHIPPED | OUTPATIENT
Start: 2022-08-03

## 2022-08-03 RX ORDER — PANTOPRAZOLE SODIUM 40 MG/1
40 TABLET, DELAYED RELEASE ORAL
Status: DISCONTINUED | OUTPATIENT
Start: 2022-08-03 | End: 2022-08-03

## 2022-08-03 RX ADMIN — TAMOXIFEN CITRATE 20 MG: 20 TABLET ORAL at 08:22

## 2022-08-03 RX ADMIN — CIPROFLOXACIN 500 MG: 500 TABLET, FILM COATED ORAL at 12:57

## 2022-08-03 RX ADMIN — PANTOPRAZOLE SODIUM 40 MG: 40 TABLET, DELAYED RELEASE ORAL at 06:32

## 2022-08-03 RX ADMIN — GABAPENTIN 200 MG: 100 CAPSULE ORAL at 13:05

## 2022-08-03 RX ADMIN — ALPRAZOLAM 0.5 MG: 0.25 TABLET ORAL at 08:27

## 2022-08-03 RX ADMIN — GABAPENTIN 200 MG: 100 CAPSULE ORAL at 08:21

## 2022-08-03 RX ADMIN — ALPRAZOLAM 0.5 MG: 0.25 TABLET ORAL at 02:25

## 2022-08-03 RX ADMIN — HYDROCODONE BITARTRATE AND ACETAMINOPHEN 1 TABLET: 10; 325 TABLET ORAL at 06:32

## 2022-08-03 ASSESSMENT — ACTIVITIES OF DAILY LIVING (ADL)
ADLS_ACUITY_SCORE: 22

## 2022-08-03 NOTE — PROGRESS NOTES
[facility]    BRIEF Progress Note - NEPHROLOGY        Gilles Malloy is a 62 year old male admitted on 7/31/2022. He has a past medical history notable for alcohol use disorder complicated by alcoholic cirrhosis with known esophageal varices and history of ascites left sided breast cancer status post mastectomy who presented via ambulance with persistent hematemesis and dizziness found to have acute blood loss anemia, hyperkalemia, anion gap metabolic acidosis and acute  kidney injury now status post crystalloid and colloid resuscitation, bicarb, potassium shifting, along with 4 total units of packed RBCs and EGD w/ variceal banding on 8/1/22,  currently off pressor support in medical ICU with improvement in MARZENA.      Nephrology was consulted for management of acute kidney failure and question of whether or not hemodialysis may be warranted from such.     #Non-oliguric Acute kidney injury (improving) likely 2/2 to prerenal etiologies in setting of hemorrhagic shock.   #Hyperkalemia (improving)  #Anion gap metabolic acidosis (improved)  #Lactic acidosis 2/2 hypoperfusion from hemorrhagic shock (resolved)  #Acute blood loss anemia secondary to upper GI bleed and a known cirrhotic with varices s/p banding and pRBCs(improved)  # Thrombocytopenia, chronic   #Hepatocellular pattern of elevation in liver enzymes  #Elevated INR           Patient in setting of hemorrhagic shock likely suffered hypoperfusion to the kidney,  U/A sediment doesn't show overt evidence of ATN. Patient Creatinine has improved adequately down to 1.27 from 1.86 yesterday and 3.2 the day before, also arguing against ATN. Likely injury from only prerenal hypoperfusion as we see for now.        - Agree with transfusion threshold >7 as ongoing.     - trend BMP and cbc     - Maintain MAPS >65 to ensure adequate perfusion to the kidneys.    - avoid nephrotoxins.     - track I/Os, with particular  "attention to the urine output.    - Agree w/ primary team for management of other issues.     Nephrology will sign off at this time.    Thanks for letting us be part of Mr. Malloy's care.         The patient's care was discussed with the patient, bedside nurse and attending physician Dr. Abisai Brantley MD   PGY2   Internal Medicine   Nephrology [facility]  Securely message with the Vocera Web Console (learn more here)  Text page via [employer] Paging/Directory        Interval History    Seen in room, no complaints. Denies fevers, chills, chest pain, dyspnea, difficulty urinating, hematuria or pain.     ROS.   All systems reviewed negative unless as indicated above.     /59 (BP Location: Right arm)   Pulse 68   Temp 98  F (36.7  C) (Oral)   Resp 10   Ht 1.727 m (5' 8\")   Wt 81.5 kg (179 lb 9.6 oz)   SpO2 95%   BMI 27.31 kg/m      General: alert and oriented x3, in no apparent cardiopulmonary distress   Neck: dressing intact on right neck, where RIJ was.    HEENT: normocephalic, atraumatic, near moist mucous membranes PERRL, EOMI, no scleral icterus or chemosis,conjunctival pallor   Neurologic: 5/5 strength in all 4 extremities, deferred gait, sensation grossly intact   Cardiac: Regular rate and rhythm, normal S1, S2, no murmurs gallops or rubs    Pulmonary: anterior and RML clear to auscultation   Abdomen: soft, non-distended, no irregular masses,no organomegaly, no rebound or guarding.   Genitourinary: No urinary catheter present   Extremities: No lower extremity edema, palpable dorsalis pedis and posterior tibialis pulses.   Musculoskeletal: No joint swelling, full ROM in all large joints   Psychiatric: Linear thought processes, normal speech, mood appropriate affect.             "

## 2022-08-03 NOTE — DISCHARGE SUMMARY
[facility]  Hospitalist Discharge Summary      Date of Admission:  7/31/2022  Date of Discharge:  8/3/2022  2:29 PM  Discharging Provider: Jaden Melissa MD  Discharge Service: Hospitalist Service, GOLD TEAM 9    Discharge Diagnoses   Hemorrhagic shock due to variceal bleed s/p banding  MARZENA  Alcohol use disorder  Acute blood loss anemia      Follow-ups Needed After Discharge   Follow-up Appointments     Adult [facility] Follow-up and recommended labs and tests      Follow up with primary care provider, Derik Olsen MD, within 7 days to   evaluate medication change.  No follow up labs or test are needed.      Appointments on [place] and/or [facility] (with [facility] or [facility]   provider or service). Call [phone] if you haven't heard regarding   these appointments within 7 days of discharge.             Unresulted Labs Ordered in the Past 30 Days of this Admission     Date and Time Order Name Status Description    7/31/2022  5:08 PM Phosphatidylethanol (PEth), Whole Blood In process     7/31/2022  2:21 PM Prepare red blood cells (unit) Preliminary     7/31/2022  1:23 PM Blood Culture Line, venous Preliminary     7/31/2022  1:23 PM Blood Culture Peripheral Blood Preliminary       These results will be followed up by hospital medicine    Discharge Disposition   Discharged to home  Condition at discharge: Stable      Hospital Course     Gilles Malloy is a 62 year old male with a past medical history of decompensated ETOH cirrhosis c/b esophageal varices and ascites who was admitted to the MICU on 7/31/2022 for severe hypotension resulting from hematemesis and melena secondary to bleeding varices. He was placed on a Levophed drip, GI was consulted and per recommendations, he underwent banding, received 3U of PRNC and fluid resuscitation. Hemoglobin improved from 6.9 janette and MARZENA recovered w/ resuscitation. At time of discharge patient has slight residual darkness  in stool but was without hemoglobin drop or hemodynamic instability or evidence of active bleeding. He was instructed to resume his lasix / spironolactone on discharge to prevent accumulation of ascites which occurs without these meds.    As per GI recommendations, completed 72 hour octreotide gtt, then discharged home on ciprofloxacin (3 more days to finish 7 day total course antibiotics which was ceftriaxone while admitted), PPI for 6 weeks, and gabapenting titrated up to 200mg TID on discharge to help with cravings, and instructed to follow-up with PCP for additional titration to goal 300mg TID. LET improved by discharge, likely related to shock and underlying liver disease. He was instructed to return to the ED immediately for any increase in bloody or dark stools, low blood pressure, dizziness, lightheadedness, abdominal pain, vomiting, decreased urine output, sudden swelling or weight gain, shortness of breath, chest pain, or any other concerning symptoms.    Hepatology plans to arrange [facility] follow up for patient for specialized care; he reports his PCP is out of our system in [place], and he was instructed to follow-up with PCP within 1 week for hospital follow-up.       Consultations This Hospital Stay   GI HEPATOLOGY ADULT IP CONSULT  PHYSICAL THERAPY ADULT IP CONSULT  OCCUPATIONAL THERAPY ADULT IP CONSULT  NEPHROLOGY GENERAL ADULT IP CONSULT    Code Status   Full Code    Time Spent on this Encounter   I, Jaden Melissa MD, personally saw the patient today and spent greater than 30 minutes discharging this patient.       Jaden Melissa MD  [facility] [address] [place] [id number]  Phone: [phone]  ______________________________________________________________________    Physical Exam   Vital Signs: Temp: 98  F (36.7  C) Temp src: Oral BP: 105/59 Pulse: 68   Resp: 10 SpO2: 95 % O2 Device: None (Room air)    Weight: 179 lbs 9.6 oz    Physical Exam  "  Constitutional: sitting up NAD  HEENT: EOMI  Neck: Symmetric  Cardiovascular: regular without murmurs or gallops  Pulmonary/Chest: slight right base crackle. No respiratory distress.  GI: Soft, non-tender , mildly-distended    Musculoskeletal:  Trace lower extremity edema   Skin: Skin is warm and dry, scattered telangiectasias and palmar erythema  Neurological: Alert and oriented   Psychiatric:  euthymic         Primary Care Physician   Derik Olsen MD    Discharge Orders      Reason for your hospital stay    You were admitted to the hospital from bleeding from \"varices\" from your liver disease in your guts. You needed lots of blood replaced - the GI docs did a scope and found these blood vessels and put bands around them to stop the bleeding. Take the CIPROFLOXACIN for 3 more days to prevent infection. Take the GABAPENTIN as prescribed to help with cravings. Take the PANTOPRAZOLE to prevent stomach ulcers for 6 week. Continue your usual furosemide and spironolactone meds to prevent ascites.    It has been a pleasure caring for you,  - Your [facility] Medicine Team     Activity    Your activity upon discharge: activity as tolerated     Adult [facility] Follow-up and recommended labs and tests    Follow up with primary care provider, Derik Olsen MD, within 7 days to evaluate medication change.  No follow up labs or test are needed.      Appointments on [place] and/or [facility] (with [facility] or [facility] provider or service). Call [phone] if you haven't heard regarding these appointments within 7 days of discharge.     Diet    Follow this diet upon discharge: Orders Placed This Encounter      Regular Diet Adult       Significant Results and Procedures   Results for orders placed or performed during the hospital encounter of 07/31/22   CT Abdomen Pelvis w/o & w Contrast    Narrative    Exam: Computed tomographic angiography of the abdomen and pelvis  without and with contrast, dated 7/31/2022 " 3:30 PM    Clinical information:  GIB    Technique: Helical scans through the abdomen and pelvis obtained  before the administration of intravenous contrast media and following  the injection of contrast media in the late arterial and delayed  phase.     Contrast: 101 mL Isovue 370    DLP: 741 mGy*cm    Comparison study: Ultrasound 6/28/2021    Findings:      The abdominal aorta is normal in caliber. Moderate atherosclerotic  disease of the abdominal aorta and its branches. Extensive  portosystemic collaterals including esophageal and gastric varices.  Circumaortic left renal vein anatomical variant.  The celiac axis, SMA and SAI are patent. The renal arteries are patent  bilaterally.   The splenic vein, portal vein, SMV and renal veins are  patent.   The hepatic veins and IVC are patent.     Abdomen and pelvis:   Cirrhotic morphology of liver. Cholecystectomy. Punctate  calcifications within the head of the pancreas. Spleen and adrenal  glands appear within normal limits. Bilateral renal cysts. Normal  caliber small and large bowel. Urinary bladder is unremarkable. Pelvic  phleboliths. No free fluid or free air. No lymphadenopathy.    Lower thorax: Minimal bibasilar dependent atelectasis, otherwise clear  lung bases.    Bones and soft tissues: Soft tissue stranding about the umbilicus. No  acute or suspicious osseous abnormality.      Impression    Impression:  Hepatic cirrhosis with evidence of portal hypertension including  extensive portosystemic collaterals of esophageal and gastroesophageal  varices. No evidence of active extravasation. However esophageal  varices are not fully visualized. Can consider upper endoscopy for  continued clinical concern of ruptured esophageal varices.    I have personally reviewed the examination and initial interpretation  and I agree with the findings.    JAYCEE RODRIGUEZ MD         SYSTEM ID:  [id number]   XR Chest Port 1 View    Narrative    EXAM: XR CHEST PORT 1 VIEW   8/1/2022 1:45 AM     HISTORY:  Central line placement       COMPARISON:  6/29/2021    TECHNIQUE: Portable frontal radiograph of the chest.    FINDINGS: Right IJ central venous catheter tip projects over the high  right atrium.    The mediastinal contours are normal. The heart is normal in size.  Unchanged perihilar opacities. No acute bone or soft tissue  abnormality. Surgical clips project over the left axilla.      Impression    IMPRESSION: Right IJ central venous catheter tip projects over the  high right atrium.     I have personally reviewed the examination and initial interpretation  and I agree with the findings.    NOLBERTO MARIN MD         SYSTEM ID:  [id number]   US Abdomen Limited    Narrative    EXAMINATION: US ABDOMEN LIMITED, 8/1/2022 8:48 AM     COMPARISON: CT 7/31/2022    HISTORY: Decompensated ETOH cirrhosis - evaluate for presence of  ascites    TECHNIQUE: Gray scale ultrasound of the abdomen.    FINDINGS:  A targeted ultrasound of all 4 abdominal quadrants was performed and  demonstrated small amount of ascites in the both lower quadrants.      Impression    IMPRESSION:  Small volume ascites in the left and right lower  quadrants.    I have personally reviewed the examination and initial interpretation  and I agree with the findings.    LANE MAURER MD         SYSTEM ID:  [id number]       Discharge Medications   Discharge Medication List as of 8/3/2022  2:05 PM      START taking these medications    Details   ciprofloxacin (CIPRO) 500 MG tablet Take 1 tablet (500 mg) by mouth every 24 hours for 3 days, Disp-3 tablet, R-0, E-Prescribe      gabapentin (NEURONTIN) 100 MG capsule Take 2 capsules (200 mg) by mouth 3 times daily for one week, then take 3 capsules (300 mg) by mouth 3 times daily., Disp-180 capsule, R-3, E-Prescribe      pantoprazole (PROTONIX) 20 MG EC tablet Take 1 tablet (20 mg) by mouth every morning (before breakfast) for 90 days, Disp-90 tablet, R-0, E-Prescribe         CONTINUE these  medications which have CHANGED    Details   furosemide (LASIX) 40 MG tablet Take 1 tablet (40 mg) by mouth daily, Historical      spironolactone (ALDACTONE) 50 MG tablet Take 1 tablet (50 mg) by mouth 2 times daily, Historical         CONTINUE these medications which have NOT CHANGED    Details   ALPRAZolam (XANAX) 0.5 MG tablet 3 times daily as needed, Historical      HYDROcodone-acetaminophen (NORCO)  MG per tablet every 6 hours as needed, Historical      multivitamin, therapeutic (THERA-VIT) TABS tablet Take 1 tablet by mouth daily, Historical      tamoxifen (NOLVADEX) 20 MG tablet TK 1 T PO DAILY, R-1, Historical           Allergies   Allergies   Allergen Reactions     Penicillins      Sulfa Drugs

## 2022-08-03 NOTE — PROGRESS NOTES
Pt has orders to be discharged today. Removed peripheral Iv's x3.  Pt's ride home will be arriving shortly. Reviewed discharge orders with pt.

## 2022-08-03 NOTE — PLAN OF CARE
ICU End of Shift Summary. See flowsheets for vital signs and detailed assessment.    Changes this shift: VSS. SR 70's. Normotensive. RA NOC. No melena or abd discomfort. PT expressed desire for a regular diet and reports a good appetite.     Plan: Medsurg tele orders in place. CIWA Q4. Monitor for melena.        Goal Outcome Evaluation:    Plan of Care Reviewed With: patient     Overall Patient Progress: improving    Outcome Evaluation: VSS. SR. RA. Normotensive. CIWA score 4. No melena

## 2022-08-04 LAB
PLPETH BLD-MCNC: 204 NG/ML
POPETH BLD-MCNC: 195 NG/ML

## 2022-08-05 LAB
BACTERIA BLD CULT: NO GROWTH
BACTERIA BLD CULT: NO GROWTH

## 2024-02-08 NOTE — PROGRESS NOTES
Pt is A/O x 4, vital signs stable, on room air, denies any pain. Regular diet, good appetite. Sitting up in chair since 08:00. Requires stand by assist only.   Pt now has orders to transfer to Med/Surg and will be on [facility] service. Pt is aware of transfer, pending room assignment.   none

## (undated) RX ORDER — PROPOFOL 10 MG/ML
INJECTION, EMULSION INTRAVENOUS
Status: DISPENSED
Start: 2021-07-15

## (undated) RX ORDER — LIDOCAINE HYDROCHLORIDE 20 MG/ML
INJECTION, SOLUTION EPIDURAL; INFILTRATION; INTRACAUDAL; PERINEURAL
Status: DISPENSED
Start: 2021-07-15

## (undated) RX ORDER — EPINEPHRINE IN SOD CHLOR,ISO 1 MG/10 ML
SYRINGE (ML) INTRAVENOUS
Status: DISPENSED
Start: 2022-08-01

## (undated) RX ORDER — SIMETHICONE 40MG/0.6ML
SUSPENSION, DROPS(FINAL DOSAGE FORM)(ML) ORAL
Status: DISPENSED
Start: 2022-08-01